# Patient Record
Sex: FEMALE | Race: WHITE | NOT HISPANIC OR LATINO | Employment: OTHER | URBAN - METROPOLITAN AREA
[De-identification: names, ages, dates, MRNs, and addresses within clinical notes are randomized per-mention and may not be internally consistent; named-entity substitution may affect disease eponyms.]

---

## 2017-01-09 ENCOUNTER — OFFICE VISIT (OUTPATIENT)
Dept: PODIATRY | Facility: CLINIC | Age: 37
End: 2017-01-09
Payer: MEDICARE

## 2017-01-09 VITALS
WEIGHT: 243.13 LBS | BODY MASS INDEX: 39.07 KG/M2 | DIASTOLIC BLOOD PRESSURE: 78 MMHG | HEIGHT: 66 IN | HEART RATE: 70 BPM | SYSTOLIC BLOOD PRESSURE: 119 MMHG

## 2017-01-09 DIAGNOSIS — B35.1 ONYCHOMYCOSIS DUE TO DERMATOPHYTE: ICD-10-CM

## 2017-01-09 DIAGNOSIS — L60.3 DYSTROPHIC NAIL: Primary | ICD-10-CM

## 2017-01-09 PROCEDURE — 99999 PR PBB SHADOW E&M-EST. PATIENT-LVL III: CPT | Mod: PBBFAC,,, | Performed by: PODIATRIST

## 2017-01-09 PROCEDURE — 99213 OFFICE O/P EST LOW 20 MIN: CPT | Mod: PBBFAC | Performed by: PODIATRIST

## 2017-01-09 PROCEDURE — 99212 OFFICE O/P EST SF 10 MIN: CPT | Mod: S$PBB,,, | Performed by: PODIATRIST

## 2017-01-09 NOTE — LETTER
January 13, 2017      Cece Winston MD  [address] [facility]  [address]  [facility] [address]           [facility]  [address]  [place] [address]  Phone: [phone]          Patient: Malorie Taylor   MR Number: [id number]   YOB: 1980   Date of Visit: 1/9/2017       Dear Dr. Cece Winston:    Thank you for referring Malorie Taylor to me for evaluation. Attached you will find relevant portions of my assessment and plan of care.    If you have questions, please do not hesitate to call me. I look forward to following Malorie Taylor along with you.    Sincerely,    Obey Brennan, ROSENDA    Enclosure  CC:  No Recipients    If you would like to receive this communication electronically, please contact [email or web address] or [phone] to request more information on [employer] Link access.    For providers and/or their staff who would like to refer a patient to [facility], please contact us through our one-stop-shop provider referral line, [facility], at [phone].    If you feel you have received this communication in error or would no longer like to receive these types of communications, please e-mail [email or web address]

## 2017-01-09 NOTE — PROGRESS NOTES
CC:    toenail fungus    HPI:   Malorie Taylor is a 36 y.o. female who presents to clinic with complaints of B/L great toe fungal toenails.  Cultures negative for fungus and Penlac has not been helping.   Patient states nails have been abnormal for a long time.  Pt. Has been applying OTC kerasal once a day since last visit 7/18/16. Has noticed improvement to nail appearance. Presents with family         Past Medical History   Diagnosis Date    Allergy     Seizures      epilepsy           Current Outpatient Prescriptions on File Prior to Visit   Medication Sig Dispense Refill    carbamazepine (TEGRETOL XR) 400 MG Tb12 Take 1 tablet (400 mg total) by mouth 2 (two) times daily. 1 Tablet Sustained Release 12 hr Oral Twice a day 180 tablet 1    ciclopirox (PENLAC) 8 % Soln Apply daily to affected nail. Must remove and restart weekly 1 Bottle 5    ferrous gluconate (FERGON) 325 MG Tab Take 1 tablet (325 mg total) by mouth daily with breakfast.  0    fish oil-omega-3 fatty acids 300-1,000 mg capsule Take 1 g by mouth once daily.        gabapentin (NEURONTIN) 600 MG tablet Take 2 tablets (1,200 mg total) by mouth 3 (three) times daily. 2 Tablet Oral Three times a day 540 tablet 1    multivitamin capsule Take 1 capsule by mouth once daily.      topiramate (TOPAMAX) 200 MG Tab Take 1 tablet (200 mg total) by mouth 2 (two) times daily. 180 tablet 1    vitamin D 1000 units Tab Take 185 mg by mouth once daily.       No current facility-administered medications on file prior to visit.           ALLG:  Review of patient's allergies indicates:   Allergen Reactions    Phenobarbital      Other reaction(s): Unknown             ROS:    General ROS: negative for - chills, fatigue or fever  Cardiovascular ROS: no chest pain or dyspnea on exertion  Musculoskeletal ROS: negative for - joint pain or joint stiffness   Skin: Negative for rash, Positive for nail or hair changes.  no itching.       EXAM:   Vitals:    01/09/17  "1429   BP: 119/78   Pulse: 70   Weight: 110.3 kg (243 lb 1.6 oz)   Height: 5' 6" (1.676 m)        General:  alert, no distress, cooperative    Vasc:  Pedal pulses present 2+  Neuro Motor:  Light touch and Sharp/dull sensation:  intact to light touch  Derm: yellow, crumbly, brittle and dystrophic nails   No presence of pigment involving the periungual skin.   Msk:  5/5 muscle strength.   Right foot: no gross deformity   Left foot: no gross deformity      ASSESSMENT / PLAN:   Patient education/counseling.      Dystrophic nail    Onychomycosis due to dermatophyte         Trim nails.  Keep well maintained at home.    Instructed patient to continue with application of OTC kerasal. Noted improvement in appearance to B/l hallux nails.     Discussed treatment options for fungus to nails including but not limited to topical kerasal, oral Lamisil, and anti fungal  creams. Patient elects to keep nails neatly trimmed and continue with OTC Kerasal       Blessing Aldridge DPM PGY-2      I have reviewed and concur with the resident's history, physical, assessment, and plan.  I have personally interviewed and examined the patient at bedside.  "

## 2017-04-10 DIAGNOSIS — G40.909 SEIZURE DISORDER: ICD-10-CM

## 2017-04-10 RX ORDER — TOPIRAMATE 200 MG/1
TABLET ORAL
Qty: 180 TABLET | Refills: 0 | Status: CANCELLED | OUTPATIENT
Start: 2017-04-10

## 2017-04-10 RX ORDER — GABAPENTIN 600 MG/1
TABLET ORAL
Qty: 540 TABLET | Refills: 0 | Status: CANCELLED | OUTPATIENT
Start: 2017-04-10

## 2017-04-11 ENCOUNTER — TELEPHONE (OUTPATIENT)
Dept: NEUROLOGY | Facility: CLINIC | Age: 37
End: 2017-04-11

## 2017-04-11 DIAGNOSIS — G40.909 SEIZURE DISORDER: ICD-10-CM

## 2017-04-11 RX ORDER — TOPIRAMATE 200 MG/1
TABLET ORAL
Qty: 180 TABLET | Refills: 0 | Status: CANCELLED | OUTPATIENT
Start: 2017-04-11

## 2017-04-11 RX ORDER — GABAPENTIN 600 MG/1
TABLET ORAL
Qty: 540 TABLET | Refills: 0 | Status: CANCELLED | OUTPATIENT
Start: 2017-04-11

## 2017-04-11 NOTE — TELEPHONE ENCOUNTER
Left message that patient may be given enough of her gabapentin and Topamax to tide her over until I see her tomorrow in clinic.

## 2017-04-12 ENCOUNTER — OFFICE VISIT (OUTPATIENT)
Dept: NEUROLOGY | Facility: CLINIC | Age: 37
End: 2017-04-12
Payer: MEDICARE

## 2017-04-12 VITALS — BODY MASS INDEX: 40 KG/M2 | WEIGHT: 248.88 LBS | HEIGHT: 66 IN

## 2017-04-12 DIAGNOSIS — F79 MENTAL RETARDATION: ICD-10-CM

## 2017-04-12 DIAGNOSIS — G40.909 SEIZURE DISORDER: Primary | ICD-10-CM

## 2017-04-12 PROCEDURE — 99213 OFFICE O/P EST LOW 20 MIN: CPT | Mod: PBBFAC

## 2017-04-12 PROCEDURE — 99999 PR PBB SHADOW E&M-EST. PATIENT-LVL III: CPT | Mod: PBBFAC,,,

## 2017-04-12 PROCEDURE — 99214 OFFICE O/P EST MOD 30 MIN: CPT | Mod: S$PBB,,,

## 2017-04-12 RX ORDER — CARBAMAZEPINE 400 MG/1
400 TABLET, EXTENDED RELEASE ORAL 2 TIMES DAILY
Qty: 180 TABLET | Refills: 1 | Status: SHIPPED | OUTPATIENT
Start: 2017-04-12 | End: 2017-10-10 | Stop reason: SDUPTHER

## 2017-04-12 RX ORDER — GABAPENTIN 600 MG/1
1200 TABLET ORAL 3 TIMES DAILY
Qty: 540 TABLET | Refills: 1 | Status: SHIPPED | OUTPATIENT
Start: 2017-04-12 | End: 2017-10-10 | Stop reason: SDUPTHER

## 2017-04-12 RX ORDER — TOPIRAMATE 200 MG/1
200 TABLET ORAL 2 TIMES DAILY
Qty: 180 TABLET | Refills: 1 | Status: SHIPPED | OUTPATIENT
Start: 2017-04-12 | End: 2017-10-10 | Stop reason: SDUPTHER

## 2017-04-12 NOTE — MR AVS SNAPSHOT
[facility]  [address]  [facility] [address]  Phone: [phone]  Fax: [phone]                  Malorie Mcmahon Brandon   2017 3:00 PM   Office Visit    Description:  Female : 1980   Provider:  Lam Gracia III, MD   Department:  [facility]           Reason for Visit     Follow-up           Diagnoses this Visit        Comments    Seizure disorder    -  Primary     Mental retardation                To Do List           Future Appointments        Provider Department Dept Phone    2017 3:00 PM Lam Gracia III, MD [facility] [phone]      Goals (5 Years of Data)     None      Follow-Up and Disposition     Return in about 6 months (around 10/12/2017).       These Medications        Disp Refills Start End    topiramate (TOPAMAX) 200 MG Tab 180 tablet 1 2017     Take 1 tablet (200 mg total) by mouth 2 (two) times daily. - Oral    Pharmacy: [facility] [address] Ph #: [phone]       Notes to Pharmacy: **Patient requests 90 days supply**    gabapentin (NEURONTIN) 600 MG tablet 540 tablet 1 2017     Take 2 tablets (1,200 mg total) by mouth 3 (three) times daily. 2 Tablet Oral Three times a day - Oral    Pharmacy: [facility] [address] Ph #: [phone]       carbamazepine (TEGRETOL XR) 400 MG Tb12 180 tablet 1 2017     Take 1 tablet (400 mg total) by mouth 2 (two) times daily. 1 Tablet Sustained Release 12 hr Oral Twice a day - Oral    Pharmacy: [facility] [address] Ph #: [phone]         [facility]     [facility] Nurse Care Line -  Assistance  Unless otherwise directed by your provider, please contact [facility], our nurse care line that is  "available for 24/7 assistance.     Registered nurses in the [facility] provide: appointment scheduling, clinical advisement, health education, and other advisory services.  Call: [phone] (toll free)               Medications           Message regarding Medications     Verify the changes and/or additions to your medication regime listed below are the same as discussed with your clinician today.  If any of these changes or additions are incorrect, please notify your healthcare provider.             Verify that the below list of medications is an accurate representation of the medications you are currently taking.  If none reported, the list may be blank. If incorrect, please contact your healthcare provider. Carry this list with you in case of emergency.           Current Medications     carbamazepine (TEGRETOL XR) 400 MG Tb12 Take 1 tablet (400 mg total) by mouth 2 (two) times daily. 1 Tablet Sustained Release 12 hr Oral Twice a day    ciclopirox (PENLAC) 8 % Soln Apply daily to affected nail. Must remove and restart weekly    ferrous gluconate (FERGON) 325 MG Tab Take 1 tablet (325 mg total) by mouth daily with breakfast.    fish oil-omega-3 fatty acids 300-1,000 mg capsule Take 1 g by mouth once daily.      gabapentin (NEURONTIN) 600 MG tablet Take 2 tablets (1,200 mg total) by mouth 3 (three) times daily. 2 Tablet Oral Three times a day    multivitamin capsule Take 1 capsule by mouth once daily.    topiramate (TOPAMAX) 200 MG Tab Take 1 tablet (200 mg total) by mouth 2 (two) times daily.    vitamin D 1000 units Tab Take 185 mg by mouth once daily.           Clinical Reference Information           Your Vitals Were     Height Weight BMI          5' 6" (1.676 m) 112.9 kg (248 lb 14.4 oz) 40.17 kg/m2        Allergies as of 4/12/2017     Phenobarbital      Immunizations Administered on Date of Encounter - 4/12/2017     None      [facility] Sign-Up     Activating your [facility] account is as easy as " 1-2-3!     1) Visit [email or web address], select Sign Up Now, enter this activation code and your date of birth, then select Next.  [email or web address]  Expires: 5/27/2017  1:41 PM      2) Create a username and password to use when you visit [facility] in the future and select a security question in case you lose your password and select Next.    3) Enter your e-mail address and click Sign Up!    Additional Information  If you have questions, please e-mail [email or web address] or call [phone] to talk to our [email or web address] staff. Remember, [facility] is NOT to be used for urgent needs. For medical emergencies, dial 911.         Language Assistance Services     ATTENTION: Language assistance services are available, free of charge. Please call [phone].      ATENCIÓN: Si habla español, tiene a willis disposición servicios gratuitos de asistencia lingüística. Llame al [phone].     CHÚ Ý: N?u b?n nói Ti?ng Vi?t, có các d?ch v? h? tr? ngôn ng? mi?n phí dành cho b?n. G?i s? [phone].         [facility] complies with applicable Federal civil rights laws and does not discriminate on the basis of race, color, national origin, age, disability, or sex.

## 2017-04-12 NOTE — PROGRESS NOTES
This office note has been dictated.  HISTORY OF PRESENT ILLNESS:  Malorie Taylor returns to Neurology Clinic for medical   management of her seizure disorder.  In spite of missing a dose of medication,   there have been no interval seizures.  She is accompanied to the office by her   father who helps provide some of the history.  She does not report any current   health issues.    NEUROLOGIC REVIEW OF SYSTEMS:  She has had allergic symptomatology and a   recent upper respiratory tract infection.  This was something that was passed   around the family, but has subsequently resolved.  She denies fever, headaches,   dizziness, tinnitus, vision or hearing problems, speech or swallowing   difficulty, chest pain, cough, shortness of breath, nausea, vomiting, diarrhea,   and incontinence.    MEDICATIONS:  I reviewed her list of medications and edited the electronic   record.    SOCIAL HISTORY:  She will be going out shopping on Friday for a new dress to   wear to her [employer] service.    PHYSICAL EXAMINATION:         GENERAL:  She is alert and attentive.  Her speech is appropriate and   adequately articulated.  VITAL SIGNS:  Reviewed and also included in this note.  She is neatly dressed   and in no acute distress.  NEUROLOGIC:  Cranial nerve examination reveals normal functioning of nerves   II-XII, which are serially examined.  The neck is supple, pulses equal and no   bruits are heard.  There is no focal motor weakness, tremor, ataxia or   dysmetria.  She has normal muscle tone and there are no gross sensory deficits.    The deep tendon reflexes are symmetrical, though depressed at the ankles.    I reviewed her clinic record including her recent laboratory the computer   console.  I discussed the situation with her and her father in detail.    IMPRESSION AND PLAN:  In conclusion, she continues to do well from the   standpoint of her seizure disorder.  Her medication will be continued.  I am   prescribing Topamax 200 mg twice  a day, carbamazepine 400 mg twice a day,   gabapentin 1200 mg three times a day.  She will need to follow up with her   primary care doctor, Dr. Sapp for her anemia.  Her next appointment with me   will be in six months.  She may call if there is a question in the interim.      FSO/HN  dd: 04/12/2017 14:25:34 (CDT)  td: 04/13/2017 08:19:57 (CDT)  Doc ID   #[id number]  Job ID #[id number]    CC:

## 2017-04-12 NOTE — LETTER
April 12, 2017      Cece Winston MD  [address] [facility]  [address]  [facility] [address]           [facility]  [address]  [place] [address]  Phone: [phone]  Fax: [phone]          Patient: Malorie Taylor   MR Number: [id number]   YOB: 1980   Date of Visit: 4/12/2017       Dear Dr. Cece Winston:    Thank you for referring Malorie Taylor to me for evaluation. Attached you will find relevant portions of my assessment and plan of care.    If you have questions, please do not hesitate to call me. I look forward to following Malorie Taylor along with you.    Sincerely,    Lam Gracia III, MD    Enclosure  CC:  No Recipients    If you would like to receive this communication electronically, please contact [email or web address] or [phone] to request more information on [employer] Link access.    For providers and/or their staff who would like to refer a patient to [facility], please contact us through our one-stop-shop provider referral line, [facility], at [phone].    If you feel you have received this communication in error or would no longer like to receive these types of communications, please e-mail [email or web address]

## 2017-05-09 DIAGNOSIS — G40.909 SEIZURE DISORDER: ICD-10-CM

## 2017-05-10 RX ORDER — CARBAMAZEPINE 400 MG/1
TABLET, EXTENDED RELEASE ORAL
Qty: 180 TABLET | Refills: 0 | OUTPATIENT
Start: 2017-05-10

## 2017-10-10 ENCOUNTER — OFFICE VISIT (OUTPATIENT)
Dept: NEUROLOGY | Facility: CLINIC | Age: 37
End: 2017-10-10
Payer: MEDICARE

## 2017-10-10 VITALS
SYSTOLIC BLOOD PRESSURE: 123 MMHG | HEIGHT: 66 IN | DIASTOLIC BLOOD PRESSURE: 77 MMHG | HEART RATE: 75 BPM | BODY MASS INDEX: 41.38 KG/M2 | WEIGHT: 257.5 LBS

## 2017-10-10 DIAGNOSIS — G40.909 SEIZURE DISORDER: ICD-10-CM

## 2017-10-10 PROCEDURE — 99999 PR PBB SHADOW E&M-EST. PATIENT-LVL III: CPT | Mod: PBBFAC,,, | Performed by: PSYCHIATRY & NEUROLOGY

## 2017-10-10 PROCEDURE — 99213 OFFICE O/P EST LOW 20 MIN: CPT | Mod: PBBFAC | Performed by: PSYCHIATRY & NEUROLOGY

## 2017-10-10 PROCEDURE — 99215 OFFICE O/P EST HI 40 MIN: CPT | Mod: S$PBB,,, | Performed by: PSYCHIATRY & NEUROLOGY

## 2017-10-10 RX ORDER — TOPIRAMATE 200 MG/1
200 TABLET ORAL 2 TIMES DAILY
Qty: 180 TABLET | Refills: 4 | Status: SHIPPED | OUTPATIENT
Start: 2017-10-10 | End: 2018-11-01 | Stop reason: SDUPTHER

## 2017-10-10 RX ORDER — CARBAMAZEPINE 400 MG/1
400 TABLET, EXTENDED RELEASE ORAL 2 TIMES DAILY
Qty: 180 TABLET | Refills: 4 | Status: SHIPPED | OUTPATIENT
Start: 2017-10-10 | End: 2018-11-01 | Stop reason: SDUPTHER

## 2017-10-10 RX ORDER — GABAPENTIN 600 MG/1
1200 TABLET ORAL 3 TIMES DAILY
Qty: 540 TABLET | Refills: 4 | Status: SHIPPED | OUTPATIENT
Start: 2017-10-10 | End: 2018-11-01 | Stop reason: SDUPTHER

## 2017-10-10 NOTE — PROGRESS NOTES
[facility]:   FOLLOW UP VISIT    Name: Malorie Taylor  MRN:[id number]   CSN: [id number]  Date of service: 10/10/2017    Last clinic visit: 4/12/17 ()    Age:37 y.o.   Gender:female     The patient is here today with her father  History obtained from the patient, her father and prior notes    CHIEF COMPLAINT:   - follow-up of seizures    INTERVAL HISTORY (Since last visit):    This is a 37 y.o.  right handed female who presents for follow-up of seizures    Controlled on her current AED regimen with no seizures > 12 years  - per father addition of Topiramate controlled the seizures    Brief seizure history:  Parents noted 1st seizure at age 7 years - but noted by 3 siblings (seperated by ~ 1 year each; 2 older and 1 younger) from earlier on (but unclear re exact onset)   Seizure types:  1) Blank stare: eyes open, lasting few seconds and unresponsive  2) Confusional episodes: more common  - GTC seizure occurred only when attempted to take off/taper AEDs   No hx of SE or admission into hospital for seizures.    SEIZURE HISTORY: Clinic note, 4/12/2017 ():  Malorie Taylor returns to Neurology Clinic for medical management of her seizure disorder.    In spite of missing a dose of medication, there have been no interval seizures.  She is accompanied to the office by her father who helps provide some of the history.  She does not report any current health issues.  IMPRESSION AND PLAN:  In conclusion, she continues to do well from the standpoint of her seizure disorder.  Her medication will be continued.    I am prescribing Topamax 200 mg twice a day, carbamazepine 400 mg twice a day, gabapentin 1200 mg three times a day.    She will need to follow up with her primary care doctor, Dr. Sapp for her anemia.    Her next appointment with me will be in six months.  She may call if there is a question in the interim    RELEVANT LABS AND TESTS SINCE LAST VISIT:   Additional tests:  1)CT Scan: normal, per father    2) EEG\Video Monitoring:no   3) PET Scan: no  4) Neuropsychological evaluation: no  5) DEXA Scan: no  6) Others: no    RISK FACTORS FOR SEIZURES:    1. Head Trauma:  No    2. CNS Infections:  No  3. CNS Tumors: No     4. CNS Vascular Disease: No     5. Febrile Seizures: No    6. Developmental Delay: Yes - required Sp Ed       7. Family History of Seizures: Yes : paternal GM - one of a twin, who had seizures   8. Birth history: FT CS (prior CS)    Pregnancy/Labor/Delivery: n/a    CURRENT MEDICATIONS:   Current Outpatient Prescriptions   Medication Sig Dispense Refill    carbamazepine (TEGRETOL XR) 400 MG Tb12 Take 1 tablet (400 mg total) by mouth 2 (two) times daily. 1 Tablet Sustained Release 12 hr Oral Twice a day 180 tablet 1    ciclopirox (PENLAC) 8 % Soln Apply daily to affected nail. Must remove and restart weekly 1 Bottle 5    ferrous gluconate (FERGON) 325 MG Tab Take 1 tablet (325 mg total) by mouth daily with breakfast.  0    fish oil-omega-3 fatty acids 300-1,000 mg capsule Take 1 g by mouth once daily.        gabapentin (NEURONTIN) 600 MG tablet Take 2 tablets (1,200 mg total) by mouth 3 (three) times daily. 2 Tablet Oral Three times a day 540 tablet 1    multivitamin capsule Take 1 capsule by mouth once daily.      topiramate (TOPAMAX) 200 MG Tab Take 1 tablet (200 mg total) by mouth 2 (two) times daily. 180 tablet 1    vitamin D 1000 units Tab Take 185 mg by mouth once daily.       No current facility-administered medications for this visit.       Not on any contraception    CURRENT ANTI EPILEPTIC MEDICATIONS:   1) Topiramate 200 mg bid  2) Carbamazepine 400 mg bid   3) Gabapentin 1200 mg tid    VAGAL NERVE STIMULATOR: n/a    PRIOR ANTICONVULSANT HISTORY: tried several prior - not sure which ones; father remembers PB (had an allergic reaction)      PAST MEDICAL HISTORY:   Active Ambulatory Problems     Diagnosis Date Noted    Seizure disorder 08/10/2012    Mental retardation 08/26/2013     Kyphosis 08/26/2013    Obesity 10/31/2015     Resolved Ambulatory Problems     Diagnosis Date Noted    No Resolved Ambulatory Problems     Past Medical History:   Diagnosis Date    Allergy     Seizures         PAST PSYCHIATRIC HISTORY:  Mood swings during menstrual periods    PAST SURGICAL HISTORY including Epilepsy surgery: No past surgical history on file.     FAMILY HISTORY:   Family History   Problem Relation Age of Onset    Acne Neg Hx     Eczema Neg Hx     Lupus Neg Hx     Psoriasis Neg Hx     Melanoma Neg Hx          SOCIAL HISTORY:   Social History     Social History    Marital status: Single     Spouse name: N/A    Number of children: N/A    Years of education: N/A     Occupational History    Housekeeping      Social History Main Topics    Smoking status: Former Smoker    Smokeless tobacco: Never Used    Alcohol use No    Drug use: Unknown    Sexual activity: Not on file     Other Topics Concern    Not on file     Social History Narrative    No narrative on file      a) Marital status: Single                                                    b) Living situation: patient lives with parents  c) Employed/Unemployed/Other: Employed part time - works ~ 2 hours per day (housekeeping) at the [employer]    DRIVING HISTORY:  Currently driving: No      LEVEL OF EDUCATION: Sp Ed    SUBSTANCE USE: no hx of tobacco, etoh or other substance use  ALLERGIES: Phenobarbital     REVIEW OF SYSTEMS:  Review of Systems     GENERAL EXAMINATION:  There were no vitals taken for this visit.     GENERAL EXAMINATION:  General Appearance:    This is an average built female who appears well.  HEENT: There are mild dysmorphic features  Skin: There are no obvious stigmata for neurocutaneous disorders.  Neck: supple   Cardiovascular: regular rate and rhythm  Lungs: clear  Abdomen: deferred  The spine is non-tender.  Kyphosis:  NoScoliosis: No   Extremities: Warm and well perfused    NEUROLOGICAL  EXAMINATION:  Mental status: Alert, appropriate and pleasant   Speech: normal  Dysarthria: No   Eyes: PERRL; EOM intact; No nystagmus.The visual pursuits  were smooth with normal saccadic eye movements.   Fundoscopic Exam: not tested  No facial asymmetry. Intact facial sensation bilaterally.  Hearing was intact bilaterally to finger rub  Tongue and palate was in the midline  Intact SCM and trapezii bilaterally     Motor examination:  Normal bulk and tone bilaterally. Power: no pronater drift; 5/5 bilaterally symmetric UE/LE  Abnormal movements: none  Deep tendon reflexes: 2+ bilaterally symmetric UE/LE with flexor plantars  Dysmetria: No     Sensory examination:   Not assessed at this time    Gait:  Normal gait and station    IMPRESSION:  The patient's history is consistent with   Seizure disorder  38yo F with hx of MR and seizures since age ~7years  - controlled on current AED regimen with no seizures > 12 years    Plan:  - continue same regimen:  1) Topiramate 200 mg bid  2) Carbamazepine 400 mg bid   3) Gabapentin 1200 mg tid  - check AED levels: pt will have PCP visit in 1 week - will get it done and send us the results    Return in 1 year or earlier prn    Plan of care was discussed with patient and her father.    The patient was asked to call me/the clinic 1 week after the test(s) are done to obtain results.  More than 50% of the time with the patient (as well as family/caregiver(s) was spent on face-to-face counseling about:  1. Diagnosis, plans, prognosis, medications and possible side-effects, risks and benefits of treatment, other alternatives to AEDs.  2. Risks related to continued seizures, status epilepticus, SUDEP, driving restrictions and seizure precautions ( no baths but showers are ok, no swimming unsupervised, no use of heavy machinery, no use of sharp moving objects, avoid heights).   3. Issues related to pregnancy, OCP and breast feeding as it relates to epilepsy (in female patients).  4. The  potential of teratogenicity (for female patients) and suicidal risks of anti-epileptic medications.  5.Avoid any activity that compromise patient safety related to seizures.    Questions and concerns raised by the patient and family/care-giver(s) were addressed and they indicated understanding of everything discussed and agreed to plans as above.    Return in 1 year or earlier prn    Karly Shankar MD, ESTEFANY(), FACNS.  Neurology-Epilepsy.

## 2017-10-26 ENCOUNTER — TELEPHONE (OUTPATIENT)
Dept: FAMILY MEDICINE | Facility: CLINIC | Age: 37
End: 2017-10-26

## 2017-10-26 DIAGNOSIS — Z00.00 ROUTINE GENERAL MEDICAL EXAMINATION AT A HEALTH CARE FACILITY: Primary | ICD-10-CM

## 2017-10-26 DIAGNOSIS — G40.909 SEIZURE DISORDER: ICD-10-CM

## 2017-10-26 DIAGNOSIS — R53.83 FATIGUE, UNSPECIFIED TYPE: ICD-10-CM

## 2017-10-26 DIAGNOSIS — E66.9 OBESITY, UNSPECIFIED CLASSIFICATION, UNSPECIFIED OBESITY TYPE, UNSPECIFIED WHETHER SERIOUS COMORBIDITY PRESENT: ICD-10-CM

## 2017-10-26 NOTE — TELEPHONE ENCOUNTER
----- Message from Jennifer Gregory sent at 10/26/2017 11:08 AM CDT -----  Contact: mother  Doctor appointment and lab have been scheduled.  Please link lab orders to the lab appointment.  Date of doctor appointment:  1/24  Physical or EP:  epp  Date of lab appointment:  1/19  Comments:

## 2017-10-26 NOTE — TELEPHONE ENCOUNTER
Per patient's insurance they will not cover a routine TSH and lipid panel patient has medicare do you have a DX's that can cover the non covered labs

## 2018-01-19 ENCOUNTER — LAB VISIT (OUTPATIENT)
Dept: LAB | Facility: HOSPITAL | Age: 38
End: 2018-01-19
Attending: FAMILY MEDICINE
Payer: MEDICARE

## 2018-01-19 DIAGNOSIS — R53.83 FATIGUE, UNSPECIFIED TYPE: ICD-10-CM

## 2018-01-19 DIAGNOSIS — G40.909 SEIZURE DISORDER: ICD-10-CM

## 2018-01-19 DIAGNOSIS — E66.9 OBESITY, UNSPECIFIED CLASSIFICATION, UNSPECIFIED OBESITY TYPE, UNSPECIFIED WHETHER SERIOUS COMORBIDITY PRESENT: ICD-10-CM

## 2018-01-19 DIAGNOSIS — Z00.00 ROUTINE GENERAL MEDICAL EXAMINATION AT A HEALTH CARE FACILITY: ICD-10-CM

## 2018-01-19 LAB
ALBUMIN SERPL BCP-MCNC: 3.5 G/DL
ALP SERPL-CCNC: 108 U/L
ALT SERPL W/O P-5'-P-CCNC: 19 U/L
ANION GAP SERPL CALC-SCNC: 7 MMOL/L
AST SERPL-CCNC: 20 U/L
BASOPHILS # BLD AUTO: 0.01 K/UL
BASOPHILS NFR BLD: 0.2 %
BILIRUB SERPL-MCNC: 0.3 MG/DL
BUN SERPL-MCNC: 13 MG/DL
CALCIUM SERPL-MCNC: 9.3 MG/DL
CHLORIDE SERPL-SCNC: 106 MMOL/L
CHOLEST SERPL-MCNC: 237 MG/DL
CHOLEST/HDLC SERPL: 4 {RATIO}
CO2 SERPL-SCNC: 23 MMOL/L
CREAT SERPL-MCNC: 0.8 MG/DL
DIFFERENTIAL METHOD: ABNORMAL
EOSINOPHIL # BLD AUTO: 0.1 K/UL
EOSINOPHIL NFR BLD: 1.7 %
ERYTHROCYTE [DISTWIDTH] IN BLOOD BY AUTOMATED COUNT: 12.1 %
EST. GFR  (AFRICAN AMERICAN): >60 ML/MIN/1.73 M^2
EST. GFR  (NON AFRICAN AMERICAN): >60 ML/MIN/1.73 M^2
GLUCOSE SERPL-MCNC: 91 MG/DL
HCT VFR BLD AUTO: 36 %
HDLC SERPL-MCNC: 60 MG/DL
HDLC SERPL: 25.3 %
HGB BLD-MCNC: 12 G/DL
IMM GRANULOCYTES # BLD AUTO: 0.02 K/UL
IMM GRANULOCYTES NFR BLD AUTO: 0.3 %
LDLC SERPL CALC-MCNC: 160.2 MG/DL
LYMPHOCYTES # BLD AUTO: 2.2 K/UL
LYMPHOCYTES NFR BLD: 33.1 %
MCH RBC QN AUTO: 34 PG
MCHC RBC AUTO-ENTMCNC: 33.3 G/DL
MCV RBC AUTO: 102 FL
MONOCYTES # BLD AUTO: 0.4 K/UL
MONOCYTES NFR BLD: 6.7 %
NEUTROPHILS # BLD AUTO: 3.8 K/UL
NEUTROPHILS NFR BLD: 58 %
NONHDLC SERPL-MCNC: 177 MG/DL
NRBC BLD-RTO: 0 /100 WBC
PLATELET # BLD AUTO: 258 K/UL
PMV BLD AUTO: 9.2 FL
POTASSIUM SERPL-SCNC: 3.9 MMOL/L
PROT SERPL-MCNC: 7.7 G/DL
RBC # BLD AUTO: 3.53 M/UL
SODIUM SERPL-SCNC: 136 MMOL/L
TRIGL SERPL-MCNC: 84 MG/DL
TSH SERPL DL<=0.005 MIU/L-ACNC: 3.09 UIU/ML
WBC # BLD AUTO: 6.53 K/UL

## 2018-01-19 PROCEDURE — 84443 ASSAY THYROID STIM HORMONE: CPT

## 2018-01-19 PROCEDURE — 80053 COMPREHEN METABOLIC PANEL: CPT

## 2018-01-19 PROCEDURE — 85025 COMPLETE CBC W/AUTO DIFF WBC: CPT

## 2018-01-19 PROCEDURE — 36415 COLL VENOUS BLD VENIPUNCTURE: CPT | Mod: PO

## 2018-01-19 PROCEDURE — 80061 LIPID PANEL: CPT

## 2018-01-24 ENCOUNTER — OFFICE VISIT (OUTPATIENT)
Dept: FAMILY MEDICINE | Facility: CLINIC | Age: 38
End: 2018-01-24
Payer: MEDICARE

## 2018-01-24 VITALS
WEIGHT: 257.69 LBS | BODY MASS INDEX: 41.42 KG/M2 | TEMPERATURE: 98 F | HEART RATE: 86 BPM | HEIGHT: 66 IN | DIASTOLIC BLOOD PRESSURE: 79 MMHG | SYSTOLIC BLOOD PRESSURE: 119 MMHG

## 2018-01-24 DIAGNOSIS — Z12.4 ROUTINE PAPANICOLAOU SMEAR: Primary | ICD-10-CM

## 2018-01-24 DIAGNOSIS — G40.909 SEIZURE DISORDER: ICD-10-CM

## 2018-01-24 PROCEDURE — 99214 OFFICE O/P EST MOD 30 MIN: CPT | Mod: S$PBB,25,, | Performed by: FAMILY MEDICINE

## 2018-01-24 PROCEDURE — 99999 PR PBB SHADOW E&M-EST. PATIENT-LVL IV: CPT | Mod: PBBFAC,,, | Performed by: FAMILY MEDICINE

## 2018-01-24 PROCEDURE — 99214 OFFICE O/P EST MOD 30 MIN: CPT | Mod: PBBFAC,PO | Performed by: FAMILY MEDICINE

## 2018-01-24 PROCEDURE — 88175 CYTOPATH C/V AUTO FLUID REDO: CPT

## 2018-01-24 PROCEDURE — G0101 CA SCREEN;PELVIC/BREAST EXAM: HCPCS | Mod: S$PBB,,, | Performed by: FAMILY MEDICINE

## 2018-01-24 PROCEDURE — G0101 CA SCREEN;PELVIC/BREAST EXAM: HCPCS | Mod: PBBFAC,PO | Performed by: FAMILY MEDICINE

## 2018-01-24 NOTE — PATIENT INSTRUCTIONS
Low-Fat Cooking Tips  To eat less fat, you may need to learn some new ways to cook. But that doesn't mean you have to eat bland, boring food. And it doesn't mean cooking needs to take any more time. Here are some tips for cooking and seasoning foods with less fat.     Broil fish instead of frying it. And sprinkle on herbs to add flavor.    Try New Cooking Methods  · Broil, roast, bake, steam, or microwave fish, chicken, turkey, and meat.  · Remove skin from chicken and turkey and trim extra fat from meat before cooking.  · Sprinkle herbs on meat, chicken, and fish, and in soups.  · Cook in broth instead of fat.  · Use nonstick cooking sprays or nonstick pans.  · Steam or microwave vegetables without adding fat. Serve with herbs, lemon juice, vinegar, or fat-free butter-flavored powder.  · To flavor beans and rice, add chopped onions, garlic, and peppers.  · Chill soups and stews. Before reheating and serving, skim off the fat.  · When you add fat, use canola or olive oil instead of butter or lard.  Lighten Up Your Recipes  · In soups and sauces: Replace whole milk or cream with low-fat milk, evaporated fat-free milk, or nonfat dry milk.  · In puddings and other desserts: Replace whole milk or cream with low-fat milk or fat-free condensed milk.  · To make dips and toppings: Use low-fat or nonfat cottage cheese or sour cream.  · To make salad dressings: Use nonfat yogurt or low-fat buttermilk.  · In place of 1 whole egg in recipes: Use 2 egg whites or 1/4 cup egg substitute.  · In place of regular cheese: Use fat-free or reduced-fat cheese.  Date Last Reviewed: 5/11/2015  © 6129-2623 [employer]. [address] [place] [address], [place] [address]. All rights reserved. This information is not intended as a substitute for professional medical care. Always follow your healthcare professional's instructions.        Low-Cholesterol Diet  Your body needs cholesterol to build new cells and create certain hormones.  There are 2 kinds of cholesterol in your blood:     · HDL (good) cholesterol. This prevents fat deposits (plaque) from building up in your arteries. In this way it protects against heart disease and stroke.  · LDL (bad) cholesterol. This stays in your body and sticks to artery walls. Over time it may block blood flow to the heart and brain. This can cause a heart attack or stroke.  The cholesterol in your blood comes from 2 sources: cholesterol in food that you eat and cholesterol that your liver makes. You should limit the amount of cholesterol in your diet. But the cholesterol that your body makes has the greatest disease risk. And your body makes more cholesterol when your diet is high in bad fats (saturated and trans fats). There are 2 kinds of fats you can eat:  · Good fats, or unsaturated fats (mono-unsaturated and poly-unsaturated). They raise the level of good cholesterol and lower the level of bad cholesterol. Good fats are found in vegetable oils such as olive, sunflower, corn, and soybean oils, and in nuts and seeds.  · Bad fats, or saturated fats (including foods high in cholesterol) and trans fats. These raise your risk of disease. They lower the good cholesterol and raise the level of bad cholesterol. Bad fats are found in animal products, including meat, whole-milk dairy products, and butter. Some plants are also high in bad fats (coconut and palm plants). Trans fats are found in hard (stick) margarines. They are also in many fast foods and commercially baked goods. Soft margarine sold in tubs has fewer trans fats and is safer to use.  High blood cholesterol is usually due to a diet high in saturated fat, along with not being physically active. In some cases, genetics plays a role in causing high cholesterol. The tips below will help you create healthy eating habits that will help lower your blood cholesterol level.  Create a diet high in good fats, low in bad fats (and low in cholesterol)  The  following steps will help you create a diet high in good fats and low in bad fats:  · Talk with your doctor before starting a low cholesterol diet or weight loss program.  · Learn to read nutrition labels and select appropriate portion sizes.  · When cooking, use plant-based unsaturated vegetable oils (sunflower, corn, soybean, canola, peanut, and olive oils).  · Avoid saturated fats found in animal products such as meat, dairy (whole-milk, cheese and ice cream), poultry skin, and egg yolks. Plants high in saturated oils include coconut oil, palm oil, and palm kernel oil.  · If you eat meat, choose smaller portions and lean cuts, such as round, denver, sirloin, or loin. Eat more meatless meals.  · Replace meat with fish at least 2 times a week. Fish is an important source of the unsaturated fat called omega-3 fatty acids. This fat has potential to lower the risk of heart disease.  · Replace whole-milk dairy products with low-fat or nonfat products. Try soy products. Soy helps to reduce total cholesterol.  · Supplement your diet with protective fibers. Eat nuts, seeds, and whole grains rather than white rice and bread. These foods lower both cholesterol and triglyceride levels. (Triglycerides are another fat found in the blood.) Walnuts are one of the best sources of omega-3 fatty acids.  · Eat plenty of fresh fruits and vegetables daily.  · Avoid fast foods and commercial baked goods. Assume they contain saturated fat unless labeled otherwise.  Date Last Reviewed: 8/1/2016  © 0156-3909 [employer]. [address] [place] [address], [place] [address]. All rights reserved. This information is not intended as a substitute for professional medical care. Always follow your healthcare professional's instructions.        Low-Fat Diet    A low-fat diet will help you lose weight. It also can lower cholesterol and prevent symptoms of gallbladder disease. The average American diet contains up to 50% fat. This means that 50%  of all calories come from fat (about 80 grams to 100 grams of fat per day). Choosing normal portions of foods from the list below can help lower your fat intake. Experts recommend that only 20% to 35% of your daily calories come from fat. The remaining 65% to 80% of calories will come from protein and carbohydrates. This is much healthier for you.  Breads  Ok: Whole-wheat or rye bread, braeden or soda crackers, jess toast, plain rolls, bagels, English muffins  Avoid: Rolls and breads containing whole milk or egg; waffles, pancakes, biscuits, corn bread; cheese crackers, other flavored crackers, pastries, doughnuts  Cereals  Ok: Oatmeal, whole-wheat, bran, multigrain, rice  Avoid: Granola or other cereals that have oil, coconut, or more than 2 grams of fat per serving  Cheese and eggs  Ok: Cheeses labeled low-fat; 3 whole eggs per week; egg whites and egg substitutes as desired  Avoid: All other cheeses  Desserts  Ok: Gelatin, slushy, duy food cake, meringues, nonfat yogurt, and puddings or sherbet made with nonfat milk  Avoid: Any other store-bought desserts, or desserts that have fat, whole milk, cream, chocolate, and coconut  Drinks  Ok: Nonfat milk, coffee, tea, fizzy (carbonated) drinks  Avoid: Whole and reduced-fat milk, evaporated and condensed milk, hot chocolate mixes, milk shakes, malts, eggnog  Fats  Ok: You may have up to 3 teaspoons of fat daily. This can be butter, margarine, mayonnaise, or healthy oils (canola or olive)  Avoid: Cream, nondairy creams, cream cheese, gravies, and cream sauces  Fruits  Ok: All fruits made without fat  Avoid: Coconut, olives  Meats, poultry, fish  Ok: Limit meat to 6 ounces daily (broiled, roasted, baked, grilled, or boiled). Buy lean cuts, and trim off the fat. Try beef, fish, lamb, pork, and canned fish packed in water; also chicken and turkey with the skin removed.  Avoid: Fried meats, fish, or poultry; fried eggs, and fish canned in oils; fatty meats such as silva,  sausage, corned beef, hot dogs, and lunch meats; meats with gravies and sauces  Potatoes, beans, pasta  Ok: Dried beans, split peas, lentils, potatoes, rice, pasta made without added fat  Avoid: French fries, potato chips, potatoes prepared with butter, refried beans  Soups  Ok: Clear broth soups without fat and with allowed vegetables  Avoid: Cream-based soups  Vegetables  Ok: Fresh, frozen, canned or dried vegetables, all made without added fat  Avoid: Fried vegetables and those prepared with butter, cream, sauces  Miscellaneous  Ok: Salt, sugar, jelly, hard candy, marshmallows, honey, syrup, spices and herbs, mustard, ketchup, lemon, and vinegar. Try to limit sweets and added sugars.  Avoid: Chocolate, nuts, coconut, and cream candies; sunflower, sesame, and other seeds; fried foods; cream sauces and gravies; pizza  Date Last Reviewed: 8/1/2016  © 8020-3602 [employer]. [address] [place] [address]. All rights reserved. This information is not intended as a substitute for professional medical care. Always follow your healthcare professional's instructions.

## 2018-01-25 NOTE — PROGRESS NOTES
Malorie Taylor is a 37 y.o. female  Source of history: Patient  Routine gyn and breast exam, f/u seizure disorder, hyperlipidemia , high risk meds.  Past Medical History:   Diagnosis Date    Allergy     Seizures     epilepsy     Patient  reports that she has never smoked. She has never used smokeless tobacco. She reports that she does not drink alcohol.  Family History   Problem Relation Age of Onset    Acne Neg Hx     Eczema Neg Hx     Lupus Neg Hx     Psoriasis Neg Hx     Melanoma Neg Hx      ROS:  GENERAL: No fever, chills, fatigability or weight loss.  SKIN: No rashes, itching or changes in color or texture of skin.  HEAD: No headaches or recent head trauma.  EYES: Visual acuity fine. No photophobia, ocular pain or diplopia.  EARS: Denies ear pain, discharge or vertigo.  NOSE: No loss of smell, no epistaxis or postnasal drip.  MOUTH & THROAT: No hoarseness or change in voice. No excessive gum bleeding.  NODES: Denies swollen glands.  CHEST: Denies VILLELA, cyanosis, wheezing, cough and sputum production.  CARDIOVASCULAR: Denies chest pain, PND, orthopnea or reduced exercise tolerance.  ABDOMEN: Appetite fine. No weight loss. Denies diarrhea, abdominal pain, hematemesis or blood in stool.  URINARY: No flank pain, dysuria or hematuria.  PERIPHERAL VASCULAR: No claudication or cyanosis.  MUSCULOSKELETAL: No joint stiffness or swelling. Denies back pain.  NEUROLOGIC: No history of seizures, paralysis, alteration of gait or coordination.    OBJECTIVE:  APPEARANCE:overweight mildly distressed  Vitals:    01/24/18 1245   BP: 119/79   Pulse: 86   Temp: 98.3 °F (36.8 °C)     SKIN: Normal skin turgor, no lesions.  HEENT: Both external auditory canals clear. Both tympanic membranes intact. PERRL. EOMI. Disk margins sharp. No tonsillar enlargement. No pharyngeal erythema or exudate. No stridor.  NECK: No bruits. No cervical spine tenderness. No cervical lymphadenopathy. No thyromegaly.  NODES: No cervical, axillary  or inguinal lymph node enlargement.  CHEST: Breath sounds clear bilaterally. Lungs clear to auscultation & percussion. Good air movement. No rales. No retractions. No rhonchi. No stridor. No wheezes.  CARDIOVASCULAR: Normal S1, S2. No murmurs. No edema.  BREASTS: no masses palpated in either breast or axillary area, symmetry noted.  ABDOMEN: Bowel sounds normal. No palpable aortic enlargement. No CVA tenderness. No pulsatile mass. No rebound tenderness.  PELVIC: speculum exam reveals a normal appearing cervix ,pap was performed and pending.  Bimanual exam difficult due to pt anxiety, grossly normal exam.  GENITALIA: normal external exam  PERIPHERAL VASCULAR: Femoral pulses present and symmetrical. No edema.  MUSCULOSKELETAL: Degenerative changes of both ankles, foot, knee, wrist and hand.  BACK: No CVA tenderness. There is no spasm, tenderness or radiculopathy noted with palpation and there is full range of motion.   NEUROLOGIC:   Cranial Nerves: II-XII grossly intact.  Motor: 5/5 strength major flexors/extensors. No tremor.  DTR's: Knees, Ankles 2+ and equal bilaterally; downgoing toes.  Sensory: Intact to light touch distally.  Gait & Posture: Normal gait and fine motion. No cerebellar signs.  MENTAL STATUS: Alert. Oriented x 3. Language skills normal. Memory intact. No suicidal ideation. Normal affect. Well kept appearance.    ASSESSMENT/PLAN:   SEizure Disorer   topiramate (TOPAMAX) 200 MG Tab 200 mg, 2 times daily        gabapentin (NEURONTIN) 600 MG tablet 1,200 mg, 3 times daily        carbamazepine (TEGRETOL XR) 400 MG Tb12 400 mg, 2 times daily       Routine PAp smear  Results pending    Hyperlipidemia  Low fat diet  Repeat lipid profile in 6 months

## 2018-01-25 NOTE — PROGRESS NOTES
Patient, Malorie Taylor (MRN #[id number]), presented with a recorded BMI of 42.23 kg/m^2 consistent with the definition of morbid obesity (ICD-10 E66.01). The patient's morbid obesity was monitored, evaluated, addressed and/or treated. This addendum to the medical record is made on 01/25/2018.

## 2018-09-13 ENCOUNTER — PES CALL (OUTPATIENT)
Dept: ADMINISTRATIVE | Facility: CLINIC | Age: 38
End: 2018-09-13

## 2018-10-08 ENCOUNTER — TELEPHONE (OUTPATIENT)
Dept: FAMILY MEDICINE | Facility: CLINIC | Age: 38
End: 2018-10-08

## 2018-10-08 DIAGNOSIS — E66.9 OBESITY, UNSPECIFIED CLASSIFICATION, UNSPECIFIED OBESITY TYPE, UNSPECIFIED WHETHER SERIOUS COMORBIDITY PRESENT: ICD-10-CM

## 2018-10-08 DIAGNOSIS — G40.909 SEIZURE DISORDER: ICD-10-CM

## 2018-10-08 DIAGNOSIS — R53.83 FATIGUE, UNSPECIFIED TYPE: ICD-10-CM

## 2018-10-08 DIAGNOSIS — Z00.00 ROUTINE GENERAL MEDICAL EXAMINATION AT A HEALTH CARE FACILITY: Primary | ICD-10-CM

## 2018-10-08 NOTE — TELEPHONE ENCOUNTER
----- Message from Jonelle Eisenberg sent at 10/8/2018  3:01 PM CDT -----  Doctor appointment and lab have been scheduled.  Please link lab orders to the lab appointment.  Date of doctor appointment:  02/08/2019  Physical or EP:  epp  Date of lab appointment:  02/04/2019  Comments:

## 2018-10-15 ENCOUNTER — TELEPHONE (OUTPATIENT)
Dept: NEUROLOGY | Facility: CLINIC | Age: 38
End: 2018-10-15

## 2018-10-15 NOTE — TELEPHONE ENCOUNTER
Spoke to father and explained to him that Patient needs to be seen for further refills.Medications filled with one additional refill.He has agreed to bring Patient on 11/1 at 2 PM.Letter to be sent.

## 2018-10-25 ENCOUNTER — OFFICE VISIT (OUTPATIENT)
Dept: INTERNAL MEDICINE | Facility: CLINIC | Age: 38
End: 2018-10-25
Payer: MEDICARE

## 2018-10-25 VITALS
HEIGHT: 65 IN | OXYGEN SATURATION: 99 % | TEMPERATURE: 98 F | HEART RATE: 66 BPM | DIASTOLIC BLOOD PRESSURE: 68 MMHG | WEIGHT: 240.06 LBS | SYSTOLIC BLOOD PRESSURE: 113 MMHG | BODY MASS INDEX: 40 KG/M2

## 2018-10-25 DIAGNOSIS — Z00.00 ENCOUNTER FOR PREVENTIVE HEALTH EXAMINATION: Primary | ICD-10-CM

## 2018-10-25 DIAGNOSIS — F79 MENTAL RETARDATION: ICD-10-CM

## 2018-10-25 DIAGNOSIS — G40.909 SEIZURE DISORDER: ICD-10-CM

## 2018-10-25 DIAGNOSIS — E66.01 SEVERE OBESITY: ICD-10-CM

## 2018-10-25 PROCEDURE — G0439 PPPS, SUBSEQ VISIT: HCPCS | Mod: ,,, | Performed by: NURSE PRACTITIONER

## 2018-10-25 PROCEDURE — 99213 OFFICE O/P EST LOW 20 MIN: CPT | Mod: PBBFAC | Performed by: NURSE PRACTITIONER

## 2018-10-25 PROCEDURE — 99999 PR PBB SHADOW E&M-EST. PATIENT-LVL III: CPT | Mod: PBBFAC,,, | Performed by: NURSE PRACTITIONER

## 2018-10-25 NOTE — PROGRESS NOTES
"Malorie Taylor presented for a  Medicare AWV and comprehensive Health Risk Assessment today. The following components were reviewed and updated:    · Medical history  · Family History  · Social history  · Allergies and Current Medications  · Health Risk Assessment  · Health Maintenance  · Care Team     ** See Completed Assessments for Annual Wellness Visit within the encounter summary.**       The following assessments were completed:  · Living Situation  · CAGE  · Depression Screening  · Timed Get Up and Go  · Whisper Test  · Cognitive Function Screening*mental disorder  · Nutrition Screening  · ADL Screening  · PAQ Screening    Vitals:    10/25/18 1311   BP: 113/68   Pulse: 66   Temp: 98.3 °F (36.8 °C)   SpO2: 99%   Weight: 108.9 kg (240 lb 1.3 oz)   Height: 5' 5" (1.651 m)     Body mass index is 39.95 kg/m².  Physical Exam   Constitutional: She appears well-developed and well-nourished.   HENT:   Head: Normocephalic and atraumatic.   Nose: Nose normal.   Eyes: Conjunctivae and EOM are normal.   Cardiovascular: Normal rate, regular rhythm, normal heart sounds and intact distal pulses.   No murmur heard.  Pulmonary/Chest: Effort normal and breath sounds normal.   Musculoskeletal: Normal range of motion.   Neurological: She is alert.   Skin: Skin is warm and dry.   Psychiatric: She has a normal mood and affect. Her behavior is normal. Thought content normal. Cognition and memory are impaired.   Nursing note and vitals reviewed.        Diagnoses and health risks identified today and associated recommendations/orders:    1. Encounter for preventive health examination  Assessment performed. Health maintenance updated. Chart review completed.  Obtain records for flu and tetanus vaccine. Patient had to have these in order to work at the .    2. Seizure disorder  Chronic. Stable on current regimen. Followed by Neurology.    3. Mental retardation  Chronic. Stable on current regimen. Followed by Neurology.    4. Severe " obesity  Chronic. Continue working out daily.   Limit carbohydrates.  Followed by PCP.      Provided Malorie with a 5-10 year written screening schedule and personal prevention plan. Recommendations were developed using the USPSTF age appropriate recommendations. Education, counseling, and referrals were provided as needed. After Visit Summary printed and given to patient which includes a list of additional screenings\tests needed.    Follow-up for follow up with Primary Care Provider as instructed, ;sooner if problems, HRA in 1 year.    MAREK Duran

## 2018-10-25 NOTE — PATIENT INSTRUCTIONS
Counseling and Referral of Other Preventative  (Italic type indicates deductible and co-insurance are waived)    Patient Name: Malorie Taylor  Today's Date: 10/25/2018    Health Maintenance       Date Due Completion Date    TETANUS VACCINE 03/07/1998 ---    Pap Smear with HPV Cotest 01/24/2021 1/24/2018        No orders of the defined types were placed in this encounter.    The following information is provided to all patients.  This information is to help you find resources for any of the problems found today that may be affecting your health:                Living healthy guide: www.[facility].[place].[facility]      Understanding Diabetes: www.diabetes.org      Eating healthy: www.cdc.gov/healthyweight      CDC home safety checklist: www.cdc.gov/steadi/patient.html      Agency on Aging: www.goea.[place].[facility]      Alcoholics anonymous (AA): www.aa.org      Physical Activity: www.paola.nih.gov/mt8tdha      Tobacco use: www.quitwithusla.org

## 2018-10-31 NOTE — PROGRESS NOTES
EPILEPSY CLINIC:   FOLLOW UP VISIT    Name: Malorie Taylor  MRN:[id number]   [facility]: [id number]    Date of service: 10/30/2018  Last clinic visit: 10/10/17  Prior clinic visit: 4/12/17 ()    Age:38 y.o.   Gender:female     The patient is here today with her father  History obtained from the patient, her father and prior notes    CHIEF COMPLAINT:   - follow-up of seizures    INTERVAL HISTORY (Since last visit):    This is a 38 y.o.  right handed female who presents for follow-up of seizures    No seizures since last visit; last sz was > 12 -13 years ago; doing well.  Attends local center for work    Current AEDs: compliant  1) Topiramate 200 mg bid  2) Carbamazepine 400 mg bid   3) Gabapentin 1200 mg tid    No levels since 2008 - per father levels are chkd by PCP    Notes from last clinic visit, 10/10/17:  Controlled on her current AED regimen with no seizures > 12 years  - per father addition of Topiramate controlled the seizures    Brief seizure history:  Parents noted 1st seizure at age 7 years - but noted by 3 siblings (seperated by ~ 1 year each; 2 older and 1 younger) from earlier on (but unclear re exact onset)   Seizure types:  1) Blank stare: eyes open, lasting few seconds and unresponsive  2) Confusional episodes: more common  - GTC seizure occurred only when attempted to take off/taper AEDs   No hx of SE or admission into hospital for seizures.    SEIZURE HISTORY: Clinic note, 4/12/2017 ():  Malorie Taylor returns to Neurology Clinic for medical management of her seizure disorder.    In spite of missing a dose of medication, there have been no interval seizures.  She is accompanied to the office by her father who helps provide some of the history.  She does not report any current health issues.  IMPRESSION AND PLAN:  In conclusion, she continues to do well from the standpoint of her seizure disorder.  Her medication will be continued.    I am prescribing Topamax 200 mg twice a day, carbamazepine  400 mg twice a day, gabapentin 1200 mg three times a day.    She will need to follow up with her primary care doctor, Dr. Sapp for her anemia.    Her next appointment with me will be in six months.  She may call if there is a question in the interim    RELEVANT LABS AND TESTS SINCE LAST VISIT:   Additional tests:  1)CT Scan: normal, per father   2) EEG\Video Monitoring:no   3) PET Scan: no  4) Neuropsychological evaluation: no  5) DEXA Scan: no  6) Others: no    RISK FACTORS FOR SEIZURES:    1. Head Trauma:  No    2. CNS Infections:  No  3. CNS Tumors: No     4. CNS Vascular Disease: No     5. Febrile Seizures: No    6. Developmental Delay: Yes - required Sp Ed       7. Family History of Seizures: Yes : paternal GM - one of a twin, who had seizures   8. Birth history: FT CS (prior CS)    Pregnancy/Labor/Delivery: n/a    CURRENT MEDICATIONS:   Current Outpatient Medications   Medication Sig Dispense Refill    carbamazepine (TEGRETOL XR) 400 MG Tb12 Take 1 tablet (400 mg total) by mouth 2 (two) times daily. 1 Tablet Sustained Release 12 hr Oral Twice a day 180 tablet 4    ciclopirox (PENLAC) 8 % Soln Apply daily to affected nail. Must remove and restart weekly 1 Bottle 5    ferrous gluconate (FERGON) 325 MG Tab Take 1 tablet (325 mg total) by mouth daily with breakfast.  0    fish oil-omega-3 fatty acids 300-1,000 mg capsule Take 1 g by mouth once daily.        gabapentin (NEURONTIN) 600 MG tablet Take 2 tablets (1,200 mg total) by mouth 3 (three) times daily. 2 Tablet Oral Three times a day 540 tablet 4    multivitamin capsule Take 1 capsule by mouth once daily.      topiramate (TOPAMAX) 200 MG Tab Take 1 tablet (200 mg total) by mouth 2 (two) times daily. 180 tablet 4    vitamin D 1000 units Tab Take 185 mg by mouth once daily.       No current facility-administered medications for this visit.       Not on any contraception    CURRENT ANTI EPILEPTIC MEDICATIONS:    1) Topiramate 200 mg bid  2)  Carbamazepine 400 mg bid   3) Gabapentin 1200 mg tid    Results for LIZA DAVIS (MRN [id number]) as of 10/30/2018 19:32   Ref. Range 1/24/2006 08:03 3/6/2008 14:20 9/22/2008 13:28 10/8/2008 07:12   Carbamazepine Lvl Latest Ref Range: 6.0 - 12.0 ug/ml 6.5 (L)  10.7    Gabapentin Lvl Latest Ref Range: 2 - 12.0 ug/mL  13.7 (H) 16.0 (H)    Lamotrigine Lvl Latest Units: ug/mL  <0.2     Topiramate Lvl Latest Ref Range: 2 - 12.0 ug/mL  7.7 Test Not Performed 12.9 (H)       VAGAL NERVE STIMULATOR: n/a    PRIOR ANTICONVULSANT HISTORY: tried several prior - not sure which ones; father remembers PB (had an allergic reaction)      PAST MEDICAL HISTORY:   Active Ambulatory Problems     Diagnosis Date Noted    Seizure disorder 08/10/2012    Mental retardation 08/26/2013    Kyphosis 08/26/2013    Severe obesity 10/31/2015     Resolved Ambulatory Problems     Diagnosis Date Noted    No Resolved Ambulatory Problems     Past Medical History:   Diagnosis Date    Allergy     Obesity 10/31/2015    Seizures         PAST PSYCHIATRIC HISTORY:  Mood swings during menstrual periods    PAST SURGICAL HISTORY including Epilepsy surgery: No past surgical history on file.     FAMILY HISTORY:   Family History   Problem Relation Age of Onset    No Known Problems Mother     Heart disease Father     No Known Problems Sister     No Known Problems Brother     No Known Problems Brother     Acne Neg Hx     Eczema Neg Hx     Lupus Neg Hx     Psoriasis Neg Hx     Melanoma Neg Hx          SOCIAL HISTORY:   Social History     Socioeconomic History    Marital status: Single     Spouse name: Not on file    Number of children: Not on file    Years of education: Not on file    Highest education level: Not on file   Social Needs    Financial resource strain: Not on file    Food insecurity - worry: Not on file    Food insecurity - inability: Not on file    Transportation needs - medical: Not on file    Transportation needs -  non-medical: Not on file   Occupational History    Occupation: Housekeeping   Tobacco Use    Smoking status: Never Smoker    Smokeless tobacco: Never Used   Substance and Sexual Activity    Alcohol use: No    Drug use: Not on file    Sexual activity: Not on file   Other Topics Concern    Are you pregnant or think you may be? Not Asked    Breast-feeding Not Asked   Social History Narrative    Not on file      a) Marital status: Single                                                    b) Living situation: patient lives with parents  c) Employed/Unemployed/Other: Employed part time - works ~ 2 hours per day (housekeeping) at the [employer]    DRIVING HISTORY:  Currently driving: No      LEVEL OF EDUCATION: Sp Ed    SUBSTANCE USE: no hx of tobacco, etoh or other substance use  ALLERGIES: Phenobarbital     REVIEW OF SYSTEMS:  Review of Systems     GENERAL EXAMINATION:  LMP 10/18/2018      GENERAL EXAMINATION:  General Appearance:    This is an average built female who appears well.  HEENT: There are mild dysmorphic features  Skin: There are no obvious stigmata for neurocutaneous disorders.  Neck: supple   Cardiovascular: regular rate and rhythm  Lungs: clear  Abdomen: deferred  The spine is non-tender.  Kyphosis:  NoScoliosis: No   Extremities: Warm and well perfused    NEUROLOGICAL EXAMINATION:  Mental status: Alert, appropriate and pleasant   Speech: normal  Dysarthria: No   Eyes: PERRL; EOM intact; No nystagmus.The visual pursuits  were smooth with normal saccadic eye movements.   Fundoscopic Exam: not tested  No facial asymmetry. Intact facial sensation bilaterally.  Hearing was intact bilaterally to finger rub  Tongue and palate was in the midline  Intact SCM and trapezii bilaterally     Motor examination:  Normal bulk and tone bilaterally. Power: no pronater drift; 5/5 bilaterally symmetric UE/LE  Abnormal movements: none  Deep tendon reflexes: 2+ bilaterally symmetric UE/LE with flexor  plantars  Dysmetria: No     Sensory examination:   Not assessed at this time    Gait:  Normal gait and station    IMPRESSION:  The patient's history is consistent with:   Seizure disorder  39yo F with hx of MR and seizures since age ~7years  - controlled on current AED regimen with no seizures > 12 years    Plan:  - continue same regimen:  1) Topiramate 200 mg bid  2) Carbamazepine 400 mg bid   3) Gabapentin 1200 mg tid  - check AED levels: pt will have PCP visit soon - will get it done and send us the results    Return in 1 year or earlier prn    Plan of care was discussed with patient and her father.    The patient was asked to call me/the clinic 1 week after the test(s) are done to obtain results.  More than 50% of the 30 minutes spent with the patient (as well as family/caregiver(s) was spent on face-to-face counseling about:  1. Diagnosis, plans, prognosis, medications and possible side-effects, risks and benefits of treatment, other alternatives to AEDs.  2. Risks related to continued seizures, status epilepticus, SUDEP, driving restrictions and seizure precautions ( no baths but showers are ok, no swimming unsupervised, no use of heavy machinery, no use of sharp moving objects, avoid heights).   3. Issues related to pregnancy, OCP and breast feeding as it relates to epilepsy (in female patients).  4. The potential of teratogenicity (for female patients) and suicidal risks of anti-epileptic medications.  5.Avoid any activity that compromise patient safety related to seizures.    Questions and concerns raised by the patient and family/care-giver(s) were addressed and they indicated understanding of everything discussed and agreed to plans as above.    Return in 1 year or earlier prn    Karly Shankar MD, ESTEFANY(), FACNS.  Neurology-Epilepsy.

## 2018-11-01 ENCOUNTER — OFFICE VISIT (OUTPATIENT)
Dept: NEUROLOGY | Facility: CLINIC | Age: 38
End: 2018-11-01
Payer: MEDICARE

## 2018-11-01 VITALS
SYSTOLIC BLOOD PRESSURE: 106 MMHG | WEIGHT: 243.38 LBS | HEART RATE: 79 BPM | BODY MASS INDEX: 40.55 KG/M2 | HEIGHT: 65 IN | DIASTOLIC BLOOD PRESSURE: 68 MMHG

## 2018-11-01 DIAGNOSIS — G40.909 SEIZURE DISORDER: ICD-10-CM

## 2018-11-01 DIAGNOSIS — F79 MENTAL RETARDATION: Primary | ICD-10-CM

## 2018-11-01 PROCEDURE — 99215 OFFICE O/P EST HI 40 MIN: CPT | Mod: S$PBB,,, | Performed by: PSYCHIATRY & NEUROLOGY

## 2018-11-01 PROCEDURE — 99999 PR PBB SHADOW E&M-EST. PATIENT-LVL II: CPT | Mod: PBBFAC,,, | Performed by: PSYCHIATRY & NEUROLOGY

## 2018-11-01 PROCEDURE — 99212 OFFICE O/P EST SF 10 MIN: CPT | Mod: PBBFAC | Performed by: PSYCHIATRY & NEUROLOGY

## 2018-11-01 RX ORDER — CARBAMAZEPINE 400 MG/1
400 TABLET, EXTENDED RELEASE ORAL 2 TIMES DAILY
Qty: 180 TABLET | Refills: 4 | Status: SHIPPED | OUTPATIENT
Start: 2018-11-01 | End: 2019-11-25 | Stop reason: SDUPTHER

## 2018-11-01 RX ORDER — GABAPENTIN 600 MG/1
1200 TABLET ORAL 3 TIMES DAILY
Qty: 540 TABLET | Refills: 4 | Status: SHIPPED | OUTPATIENT
Start: 2018-11-01 | End: 2019-11-25 | Stop reason: SDUPTHER

## 2018-11-01 RX ORDER — TOPIRAMATE 200 MG/1
200 TABLET ORAL 2 TIMES DAILY
Qty: 180 TABLET | Refills: 4 | Status: SHIPPED | OUTPATIENT
Start: 2018-11-01 | End: 2019-11-25 | Stop reason: SDUPTHER

## 2018-11-01 NOTE — ASSESSMENT & PLAN NOTE
37yo F with hx of MR and seizures since age ~7years  - controlled on current AED regimen with no seizures > 12 years    Plan:  - continue same regimen:  1) Topiramate 200 mg bid  2) Carbamazepine 400 mg bid   3) Gabapentin 1200 mg tid  - check AED levels: pt will have PCP visit soon - will get it done and send us the results    Return in 1 year or earlier prn    Plan of care was discussed with patient and her father.

## 2018-11-01 NOTE — LETTER
November 1, 2018      Cece Winston MD  [address] [facility]  [address]  [facility] [address]           [facility] - Neurology Epilepsy  [address]  [facility] [address]  Phone: [phone]  Fax: [phone]          Patient: Malorie Taylor   MR Number: [id number]   YOB: 1980   Date of Visit: 11/1/2018       Dear Dr. Cece Winston:    Thank you for referring Malorie Taylor to me for evaluation. Attached you will find relevant portions of my assessment and plan of care.    If you have questions, please do not hesitate to call me. I look forward to following Malorie Taylor along with you.    Sincerely,    Karly Shankar MD    Enclosure  CC:  No Recipients    If you would like to receive this communication electronically, please contact [email or web address] or [phone] to request more information on Patient Feed Link access.    For providers and/or their staff who would like to refer a patient to [facility], please contact us through our one-stop-shop provider referral line, [facility], at [phone].    If you feel you have received this communication in error or would no longer like to receive these types of communications, please e-mail [email or web address]

## 2019-02-04 ENCOUNTER — LAB VISIT (OUTPATIENT)
Dept: LAB | Facility: HOSPITAL | Age: 39
End: 2019-02-04
Attending: FAMILY MEDICINE
Payer: MEDICARE

## 2019-02-04 DIAGNOSIS — E66.9 OBESITY, UNSPECIFIED CLASSIFICATION, UNSPECIFIED OBESITY TYPE, UNSPECIFIED WHETHER SERIOUS COMORBIDITY PRESENT: ICD-10-CM

## 2019-02-04 DIAGNOSIS — G40.909 SEIZURE DISORDER: ICD-10-CM

## 2019-02-04 DIAGNOSIS — R53.83 FATIGUE, UNSPECIFIED TYPE: ICD-10-CM

## 2019-02-04 DIAGNOSIS — Z00.00 ROUTINE GENERAL MEDICAL EXAMINATION AT A HEALTH CARE FACILITY: ICD-10-CM

## 2019-02-04 LAB
ALBUMIN SERPL BCP-MCNC: 3.5 G/DL
ALP SERPL-CCNC: 102 U/L
ALT SERPL W/O P-5'-P-CCNC: 15 U/L
ANION GAP SERPL CALC-SCNC: 7 MMOL/L
AST SERPL-CCNC: 20 U/L
BASOPHILS # BLD AUTO: 0.01 K/UL
BASOPHILS NFR BLD: 0.2 %
BILIRUB SERPL-MCNC: 0.3 MG/DL
BUN SERPL-MCNC: 10 MG/DL
CALCIUM SERPL-MCNC: 9.3 MG/DL
CHLORIDE SERPL-SCNC: 107 MMOL/L
CHOLEST SERPL-MCNC: 209 MG/DL
CHOLEST/HDLC SERPL: 3.3 {RATIO}
CO2 SERPL-SCNC: 22 MMOL/L
CREAT SERPL-MCNC: 0.7 MG/DL
DIFFERENTIAL METHOD: ABNORMAL
EOSINOPHIL # BLD AUTO: 0.2 K/UL
EOSINOPHIL NFR BLD: 3.9 %
ERYTHROCYTE [DISTWIDTH] IN BLOOD BY AUTOMATED COUNT: 12.1 %
EST. GFR  (AFRICAN AMERICAN): >60 ML/MIN/1.73 M^2
EST. GFR  (NON AFRICAN AMERICAN): >60 ML/MIN/1.73 M^2
GLUCOSE SERPL-MCNC: 82 MG/DL
HCT VFR BLD AUTO: 36.7 %
HDLC SERPL-MCNC: 64 MG/DL
HDLC SERPL: 30.6 %
HGB BLD-MCNC: 12 G/DL
IMM GRANULOCYTES # BLD AUTO: 0.01 K/UL
IMM GRANULOCYTES NFR BLD AUTO: 0.2 %
LDLC SERPL CALC-MCNC: 125.4 MG/DL
LYMPHOCYTES # BLD AUTO: 2 K/UL
LYMPHOCYTES NFR BLD: 39.4 %
MCH RBC QN AUTO: 34.8 PG
MCHC RBC AUTO-ENTMCNC: 32.7 G/DL
MCV RBC AUTO: 106 FL
MONOCYTES # BLD AUTO: 0.4 K/UL
MONOCYTES NFR BLD: 7.7 %
NEUTROPHILS # BLD AUTO: 2.5 K/UL
NEUTROPHILS NFR BLD: 48.6 %
NONHDLC SERPL-MCNC: 145 MG/DL
NRBC BLD-RTO: 0 /100 WBC
PLATELET # BLD AUTO: 274 K/UL
PMV BLD AUTO: 9.2 FL
POTASSIUM SERPL-SCNC: 4 MMOL/L
PROT SERPL-MCNC: 7.5 G/DL
RBC # BLD AUTO: 3.45 M/UL
SODIUM SERPL-SCNC: 136 MMOL/L
TRIGL SERPL-MCNC: 98 MG/DL
TSH SERPL DL<=0.005 MIU/L-ACNC: 1.92 UIU/ML
WBC # BLD AUTO: 5.08 K/UL

## 2019-02-04 PROCEDURE — 84443 ASSAY THYROID STIM HORMONE: CPT

## 2019-02-04 PROCEDURE — 36415 COLL VENOUS BLD VENIPUNCTURE: CPT | Mod: PO

## 2019-02-04 PROCEDURE — 80053 COMPREHEN METABOLIC PANEL: CPT

## 2019-02-04 PROCEDURE — 85025 COMPLETE CBC W/AUTO DIFF WBC: CPT

## 2019-02-04 PROCEDURE — 80061 LIPID PANEL: CPT

## 2019-02-08 ENCOUNTER — OFFICE VISIT (OUTPATIENT)
Dept: FAMILY MEDICINE | Facility: CLINIC | Age: 39
End: 2019-02-08
Payer: MEDICARE

## 2019-02-08 VITALS
HEART RATE: 60 BPM | DIASTOLIC BLOOD PRESSURE: 60 MMHG | WEIGHT: 251.56 LBS | HEIGHT: 66 IN | BODY MASS INDEX: 40.43 KG/M2 | TEMPERATURE: 98 F | SYSTOLIC BLOOD PRESSURE: 102 MMHG

## 2019-02-08 DIAGNOSIS — D64.9 ANEMIA, UNSPECIFIED TYPE: Primary | ICD-10-CM

## 2019-02-08 PROCEDURE — 99395 PREV VISIT EST AGE 18-39: CPT | Mod: S$PBB,,, | Performed by: FAMILY MEDICINE

## 2019-02-08 PROCEDURE — 99213 OFFICE O/P EST LOW 20 MIN: CPT | Mod: PBBFAC,PO | Performed by: FAMILY MEDICINE

## 2019-02-08 PROCEDURE — 99999 PR PBB SHADOW E&M-EST. PATIENT-LVL III: CPT | Mod: PBBFAC,,, | Performed by: FAMILY MEDICINE

## 2019-02-08 PROCEDURE — 99999 PR PBB SHADOW E&M-EST. PATIENT-LVL III: ICD-10-PCS | Mod: PBBFAC,,, | Performed by: FAMILY MEDICINE

## 2019-02-08 PROCEDURE — 99395 PR PREVENTIVE VISIT,EST,18-39: ICD-10-PCS | Mod: S$PBB,,, | Performed by: FAMILY MEDICINE

## 2019-02-08 RX ORDER — FOLIC ACID 1 MG/1
1 TABLET ORAL DAILY
Qty: 100 TABLET | Refills: 3 | Status: SHIPPED | OUTPATIENT
Start: 2019-02-08 | End: 2022-03-09

## 2019-02-08 NOTE — PATIENT INSTRUCTIONS
Low-Fat Cooking Tips  To eat less fat, you may need to learn some new ways to cook. But that doesn't mean you have to eat bland, boring food. And it doesn't mean cooking needs to take any more time. Here are some tips for cooking and seasoning foods with less fat.     Broil fish instead of frying it. And sprinkle on herbs to add flavor.    Try New Cooking Methods  · Broil, roast, bake, steam, or microwave fish, chicken, turkey, and meat.  · Remove skin from chicken and turkey and trim extra fat from meat before cooking.  · Sprinkle herbs on meat, chicken, and fish, and in soups.  · Cook in broth instead of fat.  · Use nonstick cooking sprays or nonstick pans.  · Steam or microwave vegetables without adding fat. Serve with herbs, lemon juice, vinegar, or fat-free butter-flavored powder.  · To flavor beans and rice, add chopped onions, garlic, and peppers.  · Chill soups and stews. Before reheating and serving, skim off the fat.  · When you add fat, use canola or olive oil instead of butter or lard.  Lighten Up Your Recipes  · In soups and sauces: Replace whole milk or cream with low-fat milk, evaporated fat-free milk, or nonfat dry milk.  · In puddings and other desserts: Replace whole milk or cream with low-fat milk or fat-free condensed milk.  · To make dips and toppings: Use low-fat or nonfat cottage cheese or sour cream.  · To make salad dressings: Use nonfat yogurt or low-fat buttermilk.  · In place of 1 whole egg in recipes: Use 2 egg whites or 1/4 cup egg substitute.  · In place of regular cheese: Use fat-free or reduced-fat cheese.  Date Last Reviewed: 5/11/2015  © 5658-8247 [employer]. [address] [place] [address], [place] [address]. All rights reserved. This information is not intended as a substitute for professional medical care. Always follow your healthcare professional's instructions.        Low-Fat Diet    A low-fat diet will help you lose weight. It also can lower cholesterol and prevent  symptoms of gallbladder disease. The average American diet contains up to 50% fat. This means that 50% of all calories come from fat (about 80 grams to 100 grams of fat per day). Choosing normal portions of foods from the list below can help lower your fat intake. Experts recommend that only 20% to 35% of your daily calories come from fat. The remaining 65% to 80% of calories will come from protein and carbohydrates. This is much healthier for you.  Breads  Ok: Whole-wheat or rye bread, braeden or soda crackers, jess toast, plain rolls, bagels, English muffins  Avoid: Rolls and breads containing whole milk or egg; waffles, pancakes, biscuits, corn bread; cheese crackers, other flavored crackers, pastries, doughnuts  Cereals  Ok: Oatmeal, whole-wheat, bran, multigrain, rice  Avoid: Granola or other cereals that have oil, coconut, or more than 2 grams of fat per serving  Cheese and eggs  Ok: Cheeses labeled low-fat; 3 whole eggs per week; egg whites and egg substitutes as desired  Avoid: All other cheeses  Desserts  Ok: Gelatin, slushy, duy food cake, meringues, nonfat yogurt, and puddings or sherbet made with nonfat milk  Avoid: Any other store-bought desserts, or desserts that have fat, whole milk, cream, chocolate, and coconut  Drinks  Ok: Nonfat milk, coffee, tea, fizzy (carbonated) drinks  Avoid: Whole and reduced-fat milk, evaporated and condensed milk, hot chocolate mixes, milk shakes, malts, eggnog  Fats  Ok: You may have up to 3 teaspoons of fat daily. This can be butter, margarine, mayonnaise, or healthy oils (canola or olive)  Avoid: Cream, nondairy creams, cream cheese, gravies, and cream sauces  Fruits  Ok: All fruits made without fat  Avoid: Coconut, olives  Meats, poultry, fish  Ok: Limit meat to 6 ounces daily (broiled, roasted, baked, grilled, or boiled). Buy lean cuts, and trim off the fat. Try beef, fish, lamb, pork, and canned fish packed in water; also chicken and turkey with the skin  removed.  Avoid: Fried meats, fish, or poultry; fried eggs, and fish canned in oils; fatty meats such as silva, sausage, corned beef, hot dogs, and lunch meats; meats with gravies and sauces  Potatoes, beans, pasta  Ok: Dried beans, split peas, lentils, potatoes, rice, pasta made without added fat  Avoid: French fries, potato chips, potatoes prepared with butter, refried beans  Soups  Ok: Clear broth soups without fat and with allowed vegetables  Avoid: Cream-based soups  Vegetables  Ok: Fresh, frozen, canned or dried vegetables, all made without added fat  Avoid: Fried vegetables and those prepared with butter, cream, sauces  Miscellaneous  Ok: Salt, sugar, jelly, hard candy, marshmallows, honey, syrup, spices and herbs, mustard, ketchup, lemon, and vinegar. Try to limit sweets and added sugars.  Avoid: Chocolate, nuts, coconut, and cream candies; sunflower, sesame, and other seeds; fried foods; cream sauces and gravies; pizza  Date Last Reviewed: 8/1/2016  © 6736-4750 [employer]. [address] [place] [address], [place] [address]. All rights reserved. This information is not intended as a substitute for professional medical care. Always follow your healthcare professional's instructions.        Adding Flavor to Low-Fat Meals    There are endless ways to add more variety and flavor to your diet. You dont need salt or high-fat additions.  · Keep plenty of fresh fruit on hand. Try adding it to your main dishes. For example, peaches go well with chicken. They can be very flavorful in casseroles or with roasted poultry. Bananas or raisins add flavor to corrales dishes with a Yadiel theme.  · Buy fruits and vegetables you havent tried before. Often, recipes or suggestions for their use will be listed in the produce section at the grocery store. This is often the case with more exotic or rare foods. Perrpers can add sweet or hot characteristics to your dish.  · Go to the cookbook section at the bookstore or  library. Many of the unique flavors we associate with Nauruan, , or Yadiel dishes come from the seasonings used in their recipes. Try one new recipe a week. Youll soon know what spices to add to a dish to give it the taste of exotic places.  · Marinate meats, poultry, and fish before grilling or baking. Try mixtures of wine, fruit juice, low-sodium tomato juice, vinegar, lemon juice, herbs, and spices. Be aware that soy sauce and teriyaki sauce are high in sodium. Use low-sodium versions, and use less of them. Kinza, dry alba, and sesame seeds can add Asian flavors to foods.  · High-heat cooking. Consider cooking meat, poultry and fish by pan-searing, grilling, or broiling. These methods use high-heat and add flavor.  · Hit the juice. Add citrus juice or peel to help boost flavor.  · Ursina it up. Grilling vegetables add a different layer of flavor than other cooking methods.  Date Last Reviewed: 6/8/2015  © 9432-9737 [employer]. [address] [place] [address], [place] [address]. All rights reserved. This information is not intended as a substitute for professional medical care. Always follow your healthcare professional's instructions.

## 2019-02-09 NOTE — PROGRESS NOTES
Malorie Taylor is a 38 y.o. female   Routine physical  Source of history: Patient  Past Medical History:   Diagnosis Date    Allergy     Obesity 10/31/2015    Seizures     epilepsy     Patient  reports that  has never smoked. she has never used smokeless tobacco. She reports that she does not drink alcohol.  Family History   Problem Relation Age of Onset    No Known Problems Mother     Heart disease Father     No Known Problems Sister     No Known Problems Brother     No Known Problems Brother     Acne Neg Hx     Eczema Neg Hx     Lupus Neg Hx     Psoriasis Neg Hx     Melanoma Neg Hx      ROS:  GENERAL: No fever, chills, fatigability or weight loss.  SKIN: No rashes, itching or changes in color or texture of skin.  HEAD: No headaches or recent head trauma.  EYES: Visual acuity fine. No photophobia, ocular pain or diplopia.  EARS: Denies ear pain, discharge or vertigo.  NOSE: No loss of smell, no epistaxis or postnasal drip.  MOUTH & THROAT: No hoarseness or change in voice. No excessive gum bleeding.  NODES: Denies swollen glands.  CHEST: Denies VILLELA, cyanosis, wheezing, cough and sputum production.  CARDIOVASCULAR: Denies chest pain, PND, orthopnea or reduced exercise tolerance.  ABDOMEN: Appetite fine. No weight loss. Denies diarrhea, abdominal pain, hematemesis or blood in stool.  URINARY: No flank pain, dysuria or hematuria.  PERIPHERAL VASCULAR: No claudication or cyanosis.  MUSCULOSKELETAL: No joint stiffness or swelling. Denies back pain.  NEUROLOGIC: No history of seizures, paralysis, alteration of gait or coordination.    OBJECTIVE:  APPEARANCE:  Overweight no acute distress  Vitals:    02/08/19 1309   BP: 102/60   Pulse: 60   Temp: 97.5 °F (36.4 °C)     SKIN: Normal skin turgor, no lesions.  HEENT: Both external auditory canals clear. Both tympanic membranes intact. PERRL. EOMI.   Disk margins sharp. No tonsillar enlargement. No pharyngeal erythema or exudate. No stridor.  NECK: No bruits.  No cervical spine tenderness. No cervical lymphadenopathy. No thyromegaly.  NODES: No cervical, axillary or inguinal lymph node enlargement.  CHEST: Breath sounds clear bilaterally. Lungs clear to auscultation & percussion.   Good air movement. No rales. No retractions. No rhonchi. No stridor. No wheezes.  CARDIOVASCULAR: Normal S1, S2. No murmurs. No edema.  BREASTS: no masses palpated in either breast or axillary area, symmetry noted.  ABDOMEN: Bowel sounds normal. No palpable aortic enlargement. No CVA tenderness. No pulsatile mass. No rebound tenderness.  PERIPHERAL VASCULAR: Femoral pulses present and symmetrical. No edema.  MUSCULOSKELETAL: Degenerative changes of both ankles, foot, knee, wrist and hand.  BACK: No CVA tenderness. There is no spasm, tenderness or radiculopathy noted with palpation and there is full range of motion.   NEUROLOGIC:   Cranial Nerves: II-XII grossly intact.  Motor: 5/5 strength major flexors/extensors. No tremor.  DTR's: Knees, Ankles 2+ and equal bilaterally; downgoing toes.  Sensory: Intact to light touch distally.  Gait & Posture: Normal gait and fine motion. No cerebellar signs.  MENTAL STATUS: Alert. Oriented x 3. Language skills normal. Memory intact.  Well kept appearance.    ASSESSMENT/PLAN:   Malorie was seen today for annual exam.  Patient's labs reviewed hyperlipidemia mild, improving anemia  Diagnoses and all orders for this visit:    Anemia, unspecified type  -     folic acid (FOLVITE) 1 MG tablet; Take 1 tablet (1 mg total) by mouth once daily.           ferrous gluconate (FERGON) 325 MG Tab 325 mg, With breakfast         seizure disorder  carBAMazepine (TEGRETOL XR) 400 MG Tb12 400 mg, 2 times daily        Anticonvulsant - Monosaccharide Derivatives    topiramate (TOPAMAX) 200 MG Tab 200 mg, 2 times daily       HYPERLIPIDEMIA  Antihyperlipidemic Agents - Dietary Source Combinations    fish oil-omega-3 fatty acids 300-1,000 mg capsule 1 g, Daily   Patient will follow  low-fat diet decrease the amount of fast food she is eating  Patient's mother will help with this  Follow-up in 6 months to re-evaluate lipids

## 2019-02-09 NOTE — PROGRESS NOTES
Malorie Taylor is a 38 y.o. female   Routine physical  Source of history: Patient  Past Medical History:   Diagnosis Date    Allergy     Obesity 10/31/2015    Seizures     epilepsy     Patient  reports that  has never smoked. she has never used smokeless tobacco. She reports that she does not drink alcohol.  Family History   Problem Relation Age of Onset    No Known Problems Mother     Heart disease Father     No Known Problems Sister     No Known Problems Brother     No Known Problems Brother     Acne Neg Hx     Eczema Neg Hx     Lupus Neg Hx     Psoriasis Neg Hx     Melanoma Neg Hx      ROS:  GENERAL: No fever, chills, fatigability or weight loss.  SKIN: No rashes, itching or changes in color or texture of skin.  HEAD: No headaches or recent head trauma.  EYES: Visual acuity fine. No photophobia, ocular pain or diplopia.  EARS: Denies ear pain, discharge or vertigo.  NOSE: No loss of smell, no epistaxis or postnasal drip.  MOUTH & THROAT: No hoarseness or change in voice. No excessive gum bleeding.  NODES: Denies swollen glands.  CHEST: Denies VILLELA, cyanosis, wheezing, cough and sputum production.  CARDIOVASCULAR: Denies chest pain, PND, orthopnea or reduced exercise tolerance.  ABDOMEN: Appetite fine. No weight loss. Denies diarrhea, abdominal pain, hematemesis or blood in stool.  URINARY: No flank pain, dysuria or hematuria.  PERIPHERAL VASCULAR: No claudication or cyanosis.  MUSCULOSKELETAL: No joint stiffness or swelling. Denies back pain.  NEUROLOGIC: No history of seizures, paralysis, alteration of gait or coordination.    OBJECTIVE:  APPEARANCE:  Obese no acute distress  Vitals:    02/08/19 1309   BP: 102/60   Pulse: 60   Temp: 97.5 °F (36.4 °C)     SKIN: Normal skin turgor, no lesions.  HEENT: Both external auditory canals clear. Both tympanic membranes intact. PERRL. EOMI. Disk margins sharp. No tonsillar enlargement. No pharyngeal erythema or exudate. No stridor.  NECK: No bruits. No  cervical spine tenderness. No cervical lymphadenopathy. No thyromegaly.  NODES: No cervical, axillary or inguinal lymph node enlargement.  CHEST: Breath sounds clear bilaterally. Lungs clear to auscultation & percussion. Good air movement. No rales. No retractions. No rhonchi. No stridor. No wheezes.  CARDIOVASCULAR: Normal S1, S2. No murmurs. No edema.  BREASTS: no masses palpated in either breast or axillary area, symmetry noted.  ABDOMEN: Bowel sounds normal. No palpable aortic enlargement. No CVA tenderness. No pulsatile mass. No rebound tenderness.  PELVIC: Done at gyn exam  GENITALIA: Done at gyn exam  RECTAL: See above  PERIPHERAL VASCULAR: Femoral pulses present and symmetrical. No edema.  MUSCULOSKELETAL: Degenerative changes of both ankles, foot, knee, wrist and hand.  BACK: No CVA tenderness. There is no spasm, tenderness or radiculopathy noted with palpation and there is full range of motion.   NEUROLOGIC:   Cranial Nerves: II-XII grossly intact.  Motor: 5/5 strength major flexors/extensors. No tremor.  DTR's: Knees, Ankles 2+ and equal bilaterally; downgoing toes.  Sensory: Intact to light touch distally.  Gait & Posture: Normal gait and fine motion. No cerebellar signs.  MENTAL STATUS: Alert. Oriented x 3. Language skills normal. Memory intact. No suicidal ideation. Normal affect. Normal cognitive functions. Normal serial 7s. Can do simple math. Well kept appearance.    ASSESSMENT/PLAN:   Malorie was seen today for annual exam.    Diagnoses and all orders for this visit:    Anemia, unspecified type  -     folic acid (FOLVITE) 1 MG tablet; Take 1 tablet (1 mg total) by mouth once daily.

## 2019-05-06 ENCOUNTER — TELEPHONE (OUTPATIENT)
Dept: PRIMARY CARE CLINIC | Facility: CLINIC | Age: 39
End: 2019-05-06

## 2019-05-06 ENCOUNTER — OFFICE VISIT (OUTPATIENT)
Dept: PRIMARY CARE CLINIC | Facility: CLINIC | Age: 39
End: 2019-05-06
Payer: MEDICARE

## 2019-05-06 ENCOUNTER — LAB VISIT (OUTPATIENT)
Dept: LAB | Facility: HOSPITAL | Age: 39
End: 2019-05-06
Attending: INTERNAL MEDICINE
Payer: MEDICARE

## 2019-05-06 VITALS
TEMPERATURE: 99 F | HEIGHT: 66 IN | WEIGHT: 244.94 LBS | HEART RATE: 68 BPM | SYSTOLIC BLOOD PRESSURE: 116 MMHG | BODY MASS INDEX: 39.36 KG/M2 | DIASTOLIC BLOOD PRESSURE: 90 MMHG

## 2019-05-06 DIAGNOSIS — K64.4 EXTERNAL HEMORRHOID, BLEEDING: Primary | ICD-10-CM

## 2019-05-06 DIAGNOSIS — K64.4 EXTERNAL HEMORRHOID, BLEEDING: ICD-10-CM

## 2019-05-06 LAB
BASOPHILS # BLD AUTO: 0.01 K/UL (ref 0–0.2)
BASOPHILS NFR BLD: 0.1 % (ref 0–1.9)
DIFFERENTIAL METHOD: ABNORMAL
EOSINOPHIL # BLD AUTO: 0.1 K/UL (ref 0–0.5)
EOSINOPHIL NFR BLD: 1.9 % (ref 0–8)
ERYTHROCYTE [DISTWIDTH] IN BLOOD BY AUTOMATED COUNT: 12.2 % (ref 11.5–14.5)
HCT VFR BLD AUTO: 38.7 % (ref 37–48.5)
HGB BLD-MCNC: 12.7 G/DL (ref 12–16)
IMM GRANULOCYTES # BLD AUTO: 0.02 K/UL (ref 0–0.04)
IMM GRANULOCYTES NFR BLD AUTO: 0.3 % (ref 0–0.5)
LYMPHOCYTES # BLD AUTO: 2.2 K/UL (ref 1–4.8)
LYMPHOCYTES NFR BLD: 33.3 % (ref 18–48)
MCH RBC QN AUTO: 34.9 PG (ref 27–31)
MCHC RBC AUTO-ENTMCNC: 32.8 G/DL (ref 32–36)
MCV RBC AUTO: 106 FL (ref 82–98)
MONOCYTES # BLD AUTO: 0.5 K/UL (ref 0.3–1)
MONOCYTES NFR BLD: 8 % (ref 4–15)
NEUTROPHILS # BLD AUTO: 3.8 K/UL (ref 1.8–7.7)
NEUTROPHILS NFR BLD: 56.4 % (ref 38–73)
NRBC BLD-RTO: 0 /100 WBC
PLATELET # BLD AUTO: 266 K/UL (ref 150–350)
PMV BLD AUTO: 9.2 FL (ref 9.2–12.9)
RBC # BLD AUTO: 3.64 M/UL (ref 4–5.4)
WBC # BLD AUTO: 6.73 K/UL (ref 3.9–12.7)

## 2019-05-06 PROCEDURE — 99213 PR OFFICE/OUTPT VISIT, EST, LEVL III, 20-29 MIN: ICD-10-PCS | Mod: S$PBB,,, | Performed by: INTERNAL MEDICINE

## 2019-05-06 PROCEDURE — 85025 COMPLETE CBC W/AUTO DIFF WBC: CPT

## 2019-05-06 PROCEDURE — 99213 OFFICE O/P EST LOW 20 MIN: CPT | Mod: PBBFAC,PN | Performed by: INTERNAL MEDICINE

## 2019-05-06 PROCEDURE — 99999 PR PBB SHADOW E&M-EST. PATIENT-LVL III: ICD-10-PCS | Mod: PBBFAC,,, | Performed by: INTERNAL MEDICINE

## 2019-05-06 PROCEDURE — 99213 OFFICE O/P EST LOW 20 MIN: CPT | Mod: S$PBB,,, | Performed by: INTERNAL MEDICINE

## 2019-05-06 PROCEDURE — 36415 COLL VENOUS BLD VENIPUNCTURE: CPT | Mod: PN

## 2019-05-06 PROCEDURE — 99999 PR PBB SHADOW E&M-EST. PATIENT-LVL III: CPT | Mod: PBBFAC,,, | Performed by: INTERNAL MEDICINE

## 2019-05-06 RX ORDER — DOCUSATE SODIUM 100 MG/1
100 CAPSULE, LIQUID FILLED ORAL 2 TIMES DAILY PRN
Qty: 30 CAPSULE | Refills: 0 | Status: SHIPPED | OUTPATIENT
Start: 2019-05-06 | End: 2021-02-26

## 2019-05-06 RX ORDER — HYDROCORTISONE 25 MG/G
CREAM TOPICAL 2 TIMES DAILY
Qty: 3.5 G | Refills: 2 | Status: SHIPPED | OUTPATIENT
Start: 2019-05-06 | End: 2021-11-17 | Stop reason: SDUPTHER

## 2019-05-06 NOTE — PROGRESS NOTES
Subjective:       Patient ID: Malorie Taylor is a 39 y.o. female.    Chief Complaint: Rectal Bleeding (blood spotted in tissue after BM)    Last seen by PCP 3 months ago. Presents urgently today c/o rectal bleeding. Describes spots of bright red blood on the toilet tissue after bowel movements for the past few days. Mild anal pain. No history of colorectal disease, no previous similar episodes. Has been constipated for the past week, and had one particularly difficult bowel movement prior to onset of bleeding. No associated fever, chills, sweats, abdominal pain, N/V or diarrhea. No recent travel. Family history includes diverticulosis in mother, no GI malignancy or inflammatory bowel disease. Some relief with Preparation-H, few applications in past couple of days.     Past history, meds and allergies reviewed. No anticoagulants or regular use of NSAIDS.      Review of Systems   Neurological: Negative for dizziness, syncope and weakness.       Objective:    /90, Pulse 68, Temp 98.8  Physical Exam   Constitutional: She is oriented to person, place, and time. She appears well-developed and well-nourished. No distress.   HENT:   Nose: Nose normal.   Mouth/Throat: Oropharynx is clear and moist.   Cardiovascular: Normal rate, regular rhythm and normal heart sounds.   Pulmonary/Chest: Effort normal and breath sounds normal. No respiratory distress. She has no wheezes. She has no rales.   Genitourinary:   Genitourinary Comments: Visual inspection of external anal area reveals a small  inflamed external hemorrhoid to the right side that is tender, with stigmata of recent bleeding but no active bleeding at this time. No rash in the area, no evidence of infection including no induration of skin.    Musculoskeletal: Normal range of motion. She exhibits no edema.   Neurological: She is alert and oriented to person, place, and time.   Skin: Skin is warm and dry.       Assessment:       1. External hemorrhoid, bleeding         Plan:       External hemorrhoid, bleeding  -     CBC auto differential; Future; Expected date: 05/06/2019  -     docusate sodium (COLACE) 100 MG capsule; Take 1 capsule (100 mg total) by mouth 2 (two) times daily as needed for Constipation.  Dispense: 30 capsule; Refill: 0  -     hydrocortisone 2.5 % cream; Apply topically 2 (two) times daily. As needed for hemorrhoid pain and swelling.  Dispense: 3.5 g; Refill: 2  -     Stay well hydrated and have a high fiber diet for general bowel regularity.   Return as needed if no improvement or worsens.

## 2019-05-07 NOTE — TELEPHONE ENCOUNTER
Please report to patient and mother: blood cell count is stable, actually improved from the last one - no anemia, continue present care.

## 2019-05-27 ENCOUNTER — OFFICE VISIT (OUTPATIENT)
Dept: PRIMARY CARE CLINIC | Facility: CLINIC | Age: 39
End: 2019-05-27
Payer: MEDICARE

## 2019-05-27 VITALS
DIASTOLIC BLOOD PRESSURE: 74 MMHG | WEIGHT: 242.31 LBS | SYSTOLIC BLOOD PRESSURE: 108 MMHG | OXYGEN SATURATION: 98 % | HEART RATE: 97 BPM | BODY MASS INDEX: 40.37 KG/M2 | TEMPERATURE: 99 F | HEIGHT: 65 IN

## 2019-05-27 DIAGNOSIS — R10.32 LLQ PAIN: Primary | ICD-10-CM

## 2019-05-27 DIAGNOSIS — K59.00 CONSTIPATION, UNSPECIFIED CONSTIPATION TYPE: ICD-10-CM

## 2019-05-27 PROCEDURE — 99999 PR PBB SHADOW E&M-EST. PATIENT-LVL IV: ICD-10-PCS | Mod: PBBFAC,,, | Performed by: NURSE PRACTITIONER

## 2019-05-27 PROCEDURE — 99214 OFFICE O/P EST MOD 30 MIN: CPT | Mod: PBBFAC,PN | Performed by: NURSE PRACTITIONER

## 2019-05-27 PROCEDURE — 99213 OFFICE O/P EST LOW 20 MIN: CPT | Mod: S$PBB,,, | Performed by: NURSE PRACTITIONER

## 2019-05-27 PROCEDURE — 99999 PR PBB SHADOW E&M-EST. PATIENT-LVL IV: CPT | Mod: PBBFAC,,, | Performed by: NURSE PRACTITIONER

## 2019-05-27 PROCEDURE — 99213 PR OFFICE/OUTPT VISIT, EST, LEVL III, 20-29 MIN: ICD-10-PCS | Mod: S$PBB,,, | Performed by: NURSE PRACTITIONER

## 2019-05-27 NOTE — PROGRESS NOTES
Subjective:       Patient ID: Malorie Taylor is a 39 y.o. female.    Chief Complaint: Abdominal Pain (moved something heavy at work on Friday)    Ms. Taylor presents with her mother for LLQ abd pain that began Friday after lifting a very heavy box.  She has cognitive abnormalities that make providing a HPI or ROS difficult. Her mother helps her answer questions. Ms. Taylor reportsa that  the pain is not worse with movement or with eating. She has not had a bowel movement in 2 days, and recently stopped colace she'd been taking while dealing with a painful hemorrhoid.     Review of Systems   Constitutional: Negative for fever.   HENT: Negative for facial swelling.    Eyes: Negative for visual disturbance.   Respiratory: Negative for shortness of breath.    Cardiovascular: Negative for chest pain.   Gastrointestinal: Positive for abdominal pain and constipation. Negative for abdominal distention, diarrhea, nausea and vomiting.   Genitourinary: Negative for dysuria and frequency.   Musculoskeletal: Negative for gait problem.   Skin: Negative for rash.       Objective:      Physical Exam   Constitutional: She is oriented to person, place, and time. She appears well-developed. No distress.   obese   HENT:   Head: Normocephalic.   Eyes: No scleral icterus.   Cardiovascular: Normal rate, regular rhythm and normal heart sounds.   Pulmonary/Chest: Effort normal and breath sounds normal. No stridor. No respiratory distress. She has no wheezes. She has no rales.   Abdominal: Soft. Normal appearance and bowel sounds are normal. There is tenderness in the left lower quadrant.       Neurological: She is alert and oriented to person, place, and time.   Skin: Skin is warm and dry. She is not diaphoretic.   Psychiatric: She has a normal mood and affect. Her behavior is normal.   Nursing note and vitals reviewed.      Assessment:       1. LLQ pain    2. Constipation, unspecified constipation type        Plan:   1. LLQ pain  -  Reassurance given, will image if no improvement following treatment for constipation.     2. Constipation, unspecified constipation type  - Restart colace and gas ex.       Pt has been given instructions populated from [employer] database and has verbalized understanding of the after visit summary and information contained wherein.    Follow up with a primary care provider. May go to ER for acute shortness of breath, lightheadedness, fever, or any other emergent complaints or changes in condition.

## 2019-05-27 NOTE — PATIENT INSTRUCTIONS
- Restart colace, 1 -2 capsules daily  - Call if no improvement in 48 hours to arrange imaging.   - Heating pad, on low, as needed

## 2019-05-29 ENCOUNTER — TELEPHONE (OUTPATIENT)
Dept: PRIMARY CARE CLINIC | Facility: CLINIC | Age: 39
End: 2019-05-29

## 2019-05-29 ENCOUNTER — HOSPITAL ENCOUNTER (OUTPATIENT)
Dept: RADIOLOGY | Facility: OTHER | Age: 39
Discharge: HOME | End: 2019-05-29
Attending: NURSE PRACTITIONER
Payer: MEDICARE

## 2019-05-29 DIAGNOSIS — R10.32 LLQ PAIN: ICD-10-CM

## 2019-05-29 PROCEDURE — 74176 CT ABDOMEN PELVIS WITHOUT CONTRAST: ICD-10-PCS | Mod: 26,,, | Performed by: RADIOLOGY

## 2019-05-29 PROCEDURE — 74176 CT ABD & PELVIS W/O CONTRAST: CPT | Mod: TC

## 2019-05-29 PROCEDURE — 25500020 PHARM REV CODE 255: Performed by: NURSE PRACTITIONER

## 2019-05-29 PROCEDURE — 74176 CT ABD & PELVIS W/O CONTRAST: CPT | Mod: 26,,, | Performed by: RADIOLOGY

## 2019-05-29 RX ADMIN — IOHEXOL 30 ML: 350 INJECTION, SOLUTION INTRAVENOUS at 01:05

## 2019-05-29 NOTE — TELEPHONE ENCOUNTER
I spoke with the pt's mother, and she stated that the pt is still having abdominal pain. S/s haven't gotten worse but hasn't improved, pt didn't report ant new s/s. Pt's mother stated that she has had two large bowel movement, but that still didn't give her relief. Pt was instructed to f/u today if no improvement. Please advise.

## 2019-05-29 NOTE — TELEPHONE ENCOUNTER
----- Message from Robyn Marie sent at 5/29/2019  9:33 AM CDT -----  Contact: mother/Nelly/ [phone]  Patient would like to get medical advice.  Symptoms (please be specific):  Patient is still having the same symptoms, please call and advise mother with what will be the next steps.  How long has patient had these symptoms:    Pharmacy name and phone # (copy/paste from chart):    Any drug allergies (copy/paste from chart):      Would the patient rather a call back or a response via [facility]?:    Comments:

## 2019-05-30 ENCOUNTER — TELEPHONE (OUTPATIENT)
Dept: PRIMARY CARE CLINIC | Facility: CLINIC | Age: 39
End: 2019-05-30

## 2019-05-30 NOTE — TELEPHONE ENCOUNTER
Spoke with father Mr Taylor. Pain is improving but still there. No finding on CT to explain LLQ pain, favor muscle strain. Heat and ice. F/u by phone if no better. They will defer u/s for liver until speaking with PCP at annual visit since this area does not clinically correlate to pain she is having

## 2019-10-14 ENCOUNTER — OFFICE VISIT (OUTPATIENT)
Dept: PRIMARY CARE CLINIC | Facility: CLINIC | Age: 39
End: 2019-10-14
Payer: MEDICARE

## 2019-10-14 DIAGNOSIS — R30.0 DYSURIA: Primary | ICD-10-CM

## 2019-10-14 LAB
BACTERIA #/AREA URNS AUTO: ABNORMAL /HPF
BILIRUB UR QL STRIP: NEGATIVE
CLARITY UR REFRACT.AUTO: ABNORMAL
COLOR UR AUTO: ABNORMAL
GLUCOSE UR QL STRIP: NEGATIVE
HGB UR QL STRIP: NEGATIVE
KETONES UR QL STRIP: NEGATIVE
LEUKOCYTE ESTERASE UR QL STRIP: ABNORMAL
MICROSCOPIC COMMENT: ABNORMAL
NITRITE UR QL STRIP: POSITIVE
PH UR STRIP: 7 [PH] (ref 5–8)
PROT UR QL STRIP: NEGATIVE
RBC #/AREA URNS AUTO: 1 /HPF (ref 0–4)
SP GR UR STRIP: 1.01 (ref 1–1.03)
SQUAMOUS #/AREA URNS AUTO: 7 /HPF
URN SPEC COLLECT METH UR: ABNORMAL
WBC #/AREA URNS AUTO: 21 /HPF (ref 0–5)

## 2019-10-14 PROCEDURE — 99214 OFFICE O/P EST MOD 30 MIN: CPT | Mod: S$PBB,ICN,CMP, | Performed by: INTERNAL MEDICINE

## 2019-10-14 PROCEDURE — 99214 PR OFFICE/OUTPT VISIT, EST, LEVL IV, 30-39 MIN: ICD-10-PCS | Mod: S$PBB,ICN,CMP, | Performed by: INTERNAL MEDICINE

## 2019-10-14 PROCEDURE — 99999 PR PBB SHADOW E&M-EST. PATIENT-LVL IV: ICD-10-PCS | Mod: PBBFAC,,, | Performed by: INTERNAL MEDICINE

## 2019-10-14 PROCEDURE — 81001 URINALYSIS AUTO W/SCOPE: CPT

## 2019-10-14 PROCEDURE — 87147 CULTURE TYPE IMMUNOLOGIC: CPT

## 2019-10-14 PROCEDURE — 99999 PR PBB SHADOW E&M-EST. PATIENT-LVL IV: CPT | Mod: PBBFAC,,, | Performed by: INTERNAL MEDICINE

## 2019-10-14 PROCEDURE — 87086 URINE CULTURE/COLONY COUNT: CPT

## 2019-10-14 PROCEDURE — 99214 OFFICE O/P EST MOD 30 MIN: CPT | Mod: PBBFAC,PN | Performed by: INTERNAL MEDICINE

## 2019-10-14 PROCEDURE — 87088 URINE BACTERIA CULTURE: CPT

## 2019-10-14 RX ORDER — SULFAMETHOXAZOLE AND TRIMETHOPRIM 800; 160 MG/1; MG/1
1 TABLET ORAL 2 TIMES DAILY
Qty: 6 TABLET | Refills: 0 | Status: SHIPPED | OUTPATIENT
Start: 2019-10-14 | End: 2019-10-17

## 2019-10-14 NOTE — PATIENT INSTRUCTIONS
Sulfamethoxazole; Trimethoprim, SMX-TMP tablets  What is this medicine?  SULFAMETHOXAZOLE; TRIMETHOPRIM or SMX-TMP (suhl fuh meth OK benjie zohl; trye METH oh prim) is a combination of a sulfonamide antibiotic and a second antibiotic, trimethoprim. It is used to treat or prevent certain kinds of bacterial infections. It will not work for colds, flu, or other viral infections.  How should I use this medicine?  Take this medicine by mouth with a full glass of water. Follow the directions on the prescription label. Take your medicine at regular intervals. Do not take it more often than directed. Do not skip doses or stop your medicine early.  Talk to your pediatrician regarding the use of this medicine in children. Special care may be needed. This medicine has been used in children as young as 2 months of age.  What side effects may I notice from receiving this medicine?  Side effects that you should report to your doctor or health care professional as soon as possible:  · allergic reactions like skin rash or hives, swelling of the face, lips, or tongue  · breathing problems  · fever or chills, sore throat  · irregular heartbeat, chest pain  · joint or muscle pain  · pain or difficulty passing urine  · red pinpoint spots on skin  · redness, blistering, peeling or loosening of the skin, including inside the mouth  · unusual bleeding or bruising  · unusually weak or tired  · yellowing of the eyes or skin  Side effects that usually do not require medical attention (report to your doctor or health care professional if they continue or are bothersome):  · diarrhea  · dizziness  · headache  · loss of appetite  · nausea, vomiting  · nervousness  What may interact with this medicine?  Do not take this medicine with any of the following medications:  · aminobenzoate potassium  · dofetilide  · metronidazole  This medicine may also interact with the following medications:  · ACE inhibitors like benazepril, enalapril, lisinopril,  and ramipril  · birth control pills  · cyclosporine  · digoxin  · diuretics  · indomethacin  · medicines for diabetes  · methenamine  · methotrexate  · phenytoin  · potassium supplements  · pyrimethamine  · sulfinpyrazone  · tricyclic antidepressants  · warfarin  What if I miss a dose?  If you miss a dose, take it as soon as you can. If it is almost time for your next dose, take only that dose. Do not take double or extra doses.  Where should I keep my medicine?  Keep out of the reach of children.  Store at room temperature between 20 to 25 degrees C (68 to 77 degrees F). Protect from light. Throw away any unused medicine after the expiration date.  What should I tell my health care provider before I take this medicine?  They need to know if you have any of these conditions:  · anemia  · asthma  · being treated with anticonvulsants  · if you frequently drink alcohol containing drinks  · kidney disease  · liver disease  · low level of folic acid or glucose-6-phosphate dehydrogenase  · poor nutrition or malabsorption  · porphyria  · severe allergies  · thyroid disorder  · an unusual or allergic reaction to sulfamethoxazole, trimethoprim, sulfa drugs, other medicines, foods, dyes, or preservatives  · pregnant or trying to get pregnant  · breast-feeding  What should I watch for while using this medicine?  Tell your doctor or health care professional if your symptoms do not improve. Drink several glasses of water a day to reduce the risk of kidney problems.  Do not treat diarrhea with over the counter products. Contact your doctor if you have diarrhea that lasts more than 2 days or if it is severe and watery.  This medicine can make you more sensitive to the sun. Keep out of the sun. If you cannot avoid being in the sun, wear protective clothing and use a sunscreen. Do not use sun lamps or tanning beds/booths.  NOTE:This sheet is a summary. It may not cover all possible information. If you have questions about this  medicine, talk to your doctor, pharmacist, or health care provider. Copyright© 2017 [employer]

## 2019-10-14 NOTE — PROGRESS NOTES
[facility] Note    Chief Complaint      Chief Complaint   Patient presents with    Urinary Tract Infection       History of Present Illness      Malorie Taylor is a 39 y.o. female with Sz D/O, Intellectual Disability, Obesity presents with c/o Dysuria x 4 days    URI Sx's - + dysuria x 4 days.  No F/C.  No N/V.  No abdominal pain. No diarrhea.  Pt takes dulcolax daily prn constipation. Mom gave her Azo for the past 3 days.  She has never had a UTI before. No discharge. +Mid back pain when she puts her bra on.      Acc by her mom    HCM - Flu - Plans to get; Td - ?;  PAP - 1/24/18 - neg; PCP - Dr. Hernandez, Neuro - Dr. Shankar    Past Medical History:  Past Medical History:   Diagnosis Date    Allergy     Obesity 10/31/2015    Seizures     epilepsy       Past Surgical History:   has no past surgical history on file.    Family History:  family history includes Heart disease in her father; No Known Problems in her brother, brother, mother, and sister.     Social History:  Social History     Tobacco Use    Smoking status: Never Smoker    Smokeless tobacco: Never Used   Substance Use Topics    Alcohol use: Never     Frequency: Never    Drug use: Never       Review of Systems   Constitutional: Negative for chills and fever.   HENT: Negative for sore throat.    Respiratory: Positive for cough.         Dry cough - intermittent   Cardiovascular: Negative for chest pain.   Gastrointestinal: Positive for constipation. Negative for diarrhea, nausea and vomiting.   Genitourinary: Positive for dysuria.   Musculoskeletal: Positive for back pain.        Midback pain   Neurological: Negative for dizziness and headaches.        Medications:  Outpatient Encounter Medications as of 10/14/2019   Medication Sig Dispense Refill    carBAMazepine (TEGRETOL XR) 400 MG Tb12 Take 1 tablet (400 mg total) by mouth 2 (two) times daily. 1 Tablet Sustained Release 12 hr Oral Twice a day 180 tablet 4    docusate  sodium (COLACE) 100 MG capsule Take 1 capsule (100 mg total) by mouth 2 (two) times daily as needed for Constipation. 30 capsule 0    ferrous gluconate (FERGON) 325 MG Tab Take 1 tablet (325 mg total) by mouth daily with breakfast.  0    fish oil-omega-3 fatty acids 300-1,000 mg capsule Take 1 g by mouth 2 (two) times daily.       folic acid (FOLVITE) 1 MG tablet Take 1 tablet (1 mg total) by mouth once daily. 100 tablet 3    gabapentin (NEURONTIN) 600 MG tablet Take 2 tablets (1,200 mg total) by mouth 3 (three) times daily. 2 Tablet Oral Three times a day 540 tablet 4    multivitamin capsule Take 1 capsule by mouth once daily.      topiramate (TOPAMAX) 200 MG Tab Take 1 tablet (200 mg total) by mouth 2 (two) times daily. 180 tablet 4    vitamin D 1000 units Tab Take 185 mg by mouth once daily.      hydrocortisone 2.5 % cream Apply topically 2 (two) times daily. As needed for hemorrhoid pain and swelling. (Patient not taking: Reported on 10/14/2019) 3.5 g 2     No facility-administered encounter medications on file as of 10/14/2019.        Current Outpatient Medications:     carBAMazepine (TEGRETOL XR) 400 MG Tb12, Take 1 tablet (400 mg total) by mouth 2 (two) times daily. 1 Tablet Sustained Release 12 hr Oral Twice a day, Disp: 180 tablet, Rfl: 4    docusate sodium (COLACE) 100 MG capsule, Take 1 capsule (100 mg total) by mouth 2 (two) times daily as needed for Constipation., Disp: 30 capsule, Rfl: 0    ferrous gluconate (FERGON) 325 MG Tab, Take 1 tablet (325 mg total) by mouth daily with breakfast., Disp: , Rfl: 0    fish oil-omega-3 fatty acids 300-1,000 mg capsule, Take 1 g by mouth 2 (two) times daily. , Disp: , Rfl:     folic acid (FOLVITE) 1 MG tablet, Take 1 tablet (1 mg total) by mouth once daily., Disp: 100 tablet, Rfl: 3    gabapentin (NEURONTIN) 600 MG tablet, Take 2 tablets (1,200 mg total) by mouth 3 (three) times daily. 2 Tablet Oral Three times a day, Disp: 540 tablet, Rfl: 4     "multivitamin capsule, Take 1 capsule by mouth once daily., Disp: , Rfl:     topiramate (TOPAMAX) 200 MG Tab, Take 1 tablet (200 mg total) by mouth 2 (two) times daily., Disp: 180 tablet, Rfl: 4    vitamin D 1000 units Tab, Take 185 mg by mouth once daily., Disp: , Rfl:     hydrocortisone 2.5 % cream, Apply topically 2 (two) times daily. As needed for hemorrhoid pain and swelling. (Patient not taking: Reported on 10/14/2019), Disp: 3.5 g, Rfl: 2    Allergies:  Review of patient's allergies indicates:   Allergen Reactions    Phenobarbital      lethargy       Health Maintenance:    There is no immunization history on file for this patient.   Health Maintenance   Topic Date Due    TETANUS VACCINE  03/07/1998    Pap Smear with HPV Cotest  01/24/2021    Lipid Panel  Completed      Objective:      Vital Signs  Temp: (P) 98.1 °F (36.7 °C)  Temp src: (P) Oral  Pulse: (P) 80  Resp: (P) 15  SpO2: (P) 98 %  BP: (P) 120/79  BP Location: (P) Right arm  Patient Position: (P) Sitting  Pain Score: (P) 0-No pain  Height and Weight  Height: (P) 5' 5" (165.1 cm)  Weight: (P) 112 kg (246 lb 14.6 oz)  BSA (Calculated - sq m): (P) 2.27 sq meters  BMI (Calculated): (P) 41.2  Weight in (lb) to have BMI = 25: (P) 149.9]    Laboratory:  CBC:  Recent Labs   Lab 01/19/18  0800 02/04/19  0805 05/06/19  1428   WBC 6.53 5.08 6.73   RBC 3.53 L 3.45 L 3.64 L   Hemoglobin 12.0 12.0 12.7   Hematocrit 36.0 L 36.7 L 38.7   Platelets 258 274 266   Mean Corpuscular Volume 102 H 106 H 106 H   Mean Corpuscular Hemoglobin 34.0 H 34.8 H 34.9 H   Mean Corpuscular Hemoglobin Conc 33.3 32.7 32.8       CMP:  Recent Labs   Lab 11/29/16  0800  01/19/18  0800 02/04/19  0805   Glucose 77  --  91 82   Calcium 8.7  --  9.3 9.3   Albumin 3.5  --  3.5 3.5   Total Protein 7.1  --  7.7 7.5   Sodium 132 L   < > 136 136   Potassium 3.7  --  3.9 4.0   CO2 22 L  --  23 22 L   Chloride 103  --  106 107   BUN, Bld 13  --  13 10   Creatinine 0.7  --  0.8 0.7   eGFR if "  >60.0  --  >60.0 >60.0   eGFR if non African American >60.0  --  >60.0 >60.0   Alkaline Phosphatase 95  --  108 102   ALT 15  --  19 15   AST 20  --  20 20   Total Bilirubin 0.3  --  0.3 0.3    < > = values in this interval not displayed.       URINALYSIS:              LIPIDS:  Recent Labs   Lab 11/29/16  0800 01/19/18  0800 02/04/19  0805   TSH 1.967 3.090 1.920   HDL 64 60 64   Cholesterol 215 H 237 H 209 H   Triglycerides 55 84 98   LDL Cholesterol 140.0 160.2 H 125.4   Hdl/Cholesterol Ratio 29.8 25.3 30.6   Non-HDL Cholesterol 151 177 145   Total Cholesterol/HDL Ratio 3.4 4.0 3.3       TSH:  Recent Labs   Lab 11/29/16  0800 01/19/18  0800 02/04/19  0805   TSH 1.967 3.090 1.920       A1C:        Urine Microalbumin/Cr:  No results found for: MICALBCREAT    Other:   Lab Results   Component Value Date    [id number] 513 08/11/2012     No results found for: PSA, PSADIAG, HEPCAB    Physical Exam   Constitutional: She is oriented to person, place, and time. She appears well-developed and well-nourished. No distress.   HENT:   Head: Normocephalic and atraumatic.   Right Ear: External ear normal.   Left Ear: External ear normal.   Eyes: Pupils are equal, round, and reactive to light. Conjunctivae and EOM are normal.   Neck: Normal range of motion. Neck supple. No thyromegaly present.   Cardiovascular: Normal rate, regular rhythm, normal heart sounds and intact distal pulses.   No murmur heard.  Pulmonary/Chest: Effort normal and breath sounds normal. No respiratory distress.   Abdominal: Soft. Bowel sounds are normal. She exhibits no distension. There is no tenderness. There is no guarding.   Musculoskeletal: Normal range of motion. She exhibits no edema or deformity.   No CVA TTP   Neurological: She is alert and oriented to person, place, and time.   Skin: Skin is warm and dry. She is not diaphoretic.   Psychiatric: She has a normal mood and affect.           Assessment:       1. Dysuria        Malorie  Salome Taylor is a 39 y.o.female with:    1. Dysuria  -Will check Dip UA - + Leuk.  I will tx empirically with Bactrim DS 1 po BID x 3 days.  Pt is a poor historian but report some midback pain that has been going on for a while.  I really do not suspect she has Pyelonephritis.  No CVA TTP.  No N/V.  No F/C. If she continues to have Sx's she will RTC for another UA.     Chronic conditions status updated as per HPI.  Other than changes above, cont current medications and maintain follow up with specialists.  Return to clinic in 4 wks to fu with Dr. Hernandez.  Mom reports she is due for fu.     Ce Arciniega MD  [facility]

## 2019-10-15 LAB
BACTERIA UR CULT: ABNORMAL

## 2019-10-16 ENCOUNTER — TELEPHONE (OUTPATIENT)
Dept: PRIMARY CARE CLINIC | Facility: CLINIC | Age: 39
End: 2019-10-16

## 2019-10-16 RX ORDER — CEPHALEXIN 500 MG/1
500 CAPSULE ORAL EVERY 12 HOURS
Qty: 1414 CAPSULE | Refills: 0 | Status: SHIPPED | OUTPATIENT
Start: 2019-10-16 | End: 2019-10-23

## 2019-10-16 NOTE — TELEPHONE ENCOUNTER
I d/w pt's mom her recent UCx results. PT still symptomatic per mom. UTI + GBS.  Will send in Cephalexin 500 mg po BID x 7 days

## 2019-10-22 ENCOUNTER — TELEPHONE (OUTPATIENT)
Dept: PRIMARY CARE CLINIC | Facility: CLINIC | Age: 39
End: 2019-10-22

## 2019-10-22 DIAGNOSIS — F41.9 ANXIETY DISORDER, UNSPECIFIED: ICD-10-CM

## 2019-10-22 DIAGNOSIS — R79.9 ABNORMAL FINDING OF BLOOD CHEMISTRY, UNSPECIFIED: ICD-10-CM

## 2019-10-22 DIAGNOSIS — Z00.00 ANNUAL PHYSICAL EXAM: ICD-10-CM

## 2019-10-22 DIAGNOSIS — M40.209 KYPHOSIS, UNSPECIFIED KYPHOSIS TYPE, UNSPECIFIED SPINAL REGION: Primary | ICD-10-CM

## 2019-10-22 DIAGNOSIS — R56.9 SEIZURE: ICD-10-CM

## 2019-10-22 NOTE — TELEPHONE ENCOUNTER
Please see message and advise    Pt needs lab orders signed to add to appt    ----- Message from Mya Cleary sent at 10/22/2019  1:36 PM CDT -----  Lab Orders Needed    I have scheduled the above patient's annual physical and lab appointments. Lab orders need to be placed and linked.    Date of Annual Physical: 02/19/2020    Date of Lab appt: 02/14/2020    Thank You

## 2019-10-28 ENCOUNTER — PATIENT OUTREACH (OUTPATIENT)
Dept: ADMINISTRATIVE | Facility: HOSPITAL | Age: 39
End: 2019-10-28

## 2019-11-10 NOTE — PROGRESS NOTES
[facility] Note    Chief Complaint    No chief complaint on file.      History of Present Illness      Malorie Taylor is a 39 y.o. female with Sz D/O, Intellectual Disability, Obesity presents to fu Acute URI Sx's x 2 days.  She is sched for well visit with Dr. Hernandez 2/19/20     GBS UTI - tx with Cephalexin x 7 days s/p visit with me on 10/14/19.   No F/C.  No N/V.  No dysuria.  No dark urine/smelly urine. No inc freq.    Acute URI - Cough x 2 days.  +Sneezing. She has been taking Dayquil, Nyquil, and gargling with salt water.  No Sore throat.  + Congestion.      Acc by her mom     HCM - Flu - 11/4/19; Td - ?;  PAP - 1/24/18 - neg; PCP - Dr. Hernandez, Neuro - Dr. Shankar    Past Medical History:  Past Medical History:   Diagnosis Date    Allergy     Obesity 10/31/2015    Seizures     epilepsy       Past Surgical History:   has no past surgical history on file.    Family History:  family history includes Heart disease in her father; No Known Problems in her brother, brother, mother, and sister.     Social History:  Social History     Tobacco Use    Smoking status: Never Smoker    Smokeless tobacco: Never Used   Substance Use Topics    Alcohol use: Never     Frequency: Never    Drug use: Never       Review of Systems   Constitutional: Negative for chills and fever.   HENT: Positive for congestion. Negative for ear pain and sore throat.         +post nasal drip, sneezing   Respiratory: Positive for cough.    Gastrointestinal: Negative for abdominal pain, nausea and vomiting.   Genitourinary: Negative for dysuria and frequency.   Musculoskeletal: Negative for joint pain and myalgias.        Medications:  Outpatient Encounter Medications as of 11/11/2019   Medication Sig Dispense Refill    carBAMazepine (TEGRETOL XR) 400 MG Tb12 Take 1 tablet (400 mg total) by mouth 2 (two) times daily. 1 Tablet Sustained Release 12 hr Oral Twice a day 180 tablet 4    docusate sodium (COLACE)  100 MG capsule Take 1 capsule (100 mg total) by mouth 2 (two) times daily as needed for Constipation. 30 capsule 0    ferrous gluconate (FERGON) 325 MG Tab Take 1 tablet (325 mg total) by mouth daily with breakfast.  0    fish oil-omega-3 fatty acids 300-1,000 mg capsule Take 1 g by mouth 2 (two) times daily.       folic acid (FOLVITE) 1 MG tablet Take 1 tablet (1 mg total) by mouth once daily. 100 tablet 3    gabapentin (NEURONTIN) 600 MG tablet Take 2 tablets (1,200 mg total) by mouth 3 (three) times daily. 2 Tablet Oral Three times a day 540 tablet 4    hydrocortisone 2.5 % cream Apply topically 2 (two) times daily. As needed for hemorrhoid pain and swelling. (Patient not taking: Reported on 10/14/2019) 3.5 g 2    multivitamin capsule Take 1 capsule by mouth once daily.      topiramate (TOPAMAX) 200 MG Tab Take 1 tablet (200 mg total) by mouth 2 (two) times daily. 180 tablet 4    vitamin D 1000 units Tab Take 185 mg by mouth once daily.       No facility-administered encounter medications on file as of 11/11/2019.        Current Outpatient Medications:     carBAMazepine (TEGRETOL XR) 400 MG Tb12, Take 1 tablet (400 mg total) by mouth 2 (two) times daily. 1 Tablet Sustained Release 12 hr Oral Twice a day, Disp: 180 tablet, Rfl: 4    docusate sodium (COLACE) 100 MG capsule, Take 1 capsule (100 mg total) by mouth 2 (two) times daily as needed for Constipation., Disp: 30 capsule, Rfl: 0    ferrous gluconate (FERGON) 325 MG Tab, Take 1 tablet (325 mg total) by mouth daily with breakfast., Disp: , Rfl: 0    fish oil-omega-3 fatty acids 300-1,000 mg capsule, Take 1 g by mouth 2 (two) times daily. , Disp: , Rfl:     folic acid (FOLVITE) 1 MG tablet, Take 1 tablet (1 mg total) by mouth once daily., Disp: 100 tablet, Rfl: 3    gabapentin (NEURONTIN) 600 MG tablet, Take 2 tablets (1,200 mg total) by mouth 3 (three) times daily. 2 Tablet Oral Three times a day, Disp: 540 tablet, Rfl: 4    hydrocortisone 2.5 %  "cream, Apply topically 2 (two) times daily. As needed for hemorrhoid pain and swelling. (Patient not taking: Reported on 10/14/2019), Disp: 3.5 g, Rfl: 2    multivitamin capsule, Take 1 capsule by mouth once daily., Disp: , Rfl:     topiramate (TOPAMAX) 200 MG Tab, Take 1 tablet (200 mg total) by mouth 2 (two) times daily., Disp: 180 tablet, Rfl: 4    vitamin D 1000 units Tab, Take 185 mg by mouth once daily., Disp: , Rfl:     Allergies:  Review of patient's allergies indicates:   Allergen Reactions    Phenobarbital      lethargy       Health Maintenance:  Immunization History   Administered Date(s) Administered    Tdap 02/01/2018      Health Maintenance   Topic Date Due    Pap Smear with HPV Cotest  01/24/2021    TETANUS VACCINE  02/01/2028    Lipid Panel  Completed      Objective:      Vital Signs  Temp: 97.8 °F (36.6 °C)  Temp src: Oral  Pulse: 64  Resp: 16  SpO2: 99 %  BP: 100/75  BP Location: Right arm  Patient Position: Sitting  Pain Score: 0-No pain  Height and Weight  Height: 5' 5" (165.1 cm)  Weight: 111.9 kg (246 lb 11.1 oz)  BSA (Calculated - sq m): 2.27 sq meters  BMI (Calculated): 41.1  Weight in (lb) to have BMI = 25: 149.9]    Laboratory:  CBC:  Recent Labs   Lab 01/19/18  0800 02/04/19  0805 05/06/19  1428   WBC 6.53 5.08 6.73   RBC 3.53 L 3.45 L 3.64 L   Hemoglobin 12.0 12.0 12.7   Hematocrit 36.0 L 36.7 L 38.7   Platelets 258 274 266   Mean Corpuscular Volume 102 H 106 H 106 H   Mean Corpuscular Hemoglobin 34.0 H 34.8 H 34.9 H   Mean Corpuscular Hemoglobin Conc 33.3 32.7 32.8       CMP:  Recent Labs   Lab 11/29/16  0800  01/19/18  0800 02/04/19  0805   Glucose 77  --  91 82   Calcium 8.7  --  9.3 9.3   Albumin 3.5  --  3.5 3.5   Total Protein 7.1  --  7.7 7.5   Sodium 132 L   < > 136 136   Potassium 3.7  --  3.9 4.0   CO2 22 L  --  23 22 L   Chloride 103  --  106 107   BUN, Bld 13  --  13 10   Creatinine 0.7  --  0.8 0.7   eGFR if African American >60.0  --  >60.0 >60.0   eGFR if non "  >60.0  --  >60.0 >60.0   Alkaline Phosphatase 95  --  108 102   ALT 15  --  19 15   AST 20  --  20 20   Total Bilirubin 0.3  --  0.3 0.3    < > = values in this interval not displayed.       URINALYSIS:  Recent Labs   Lab 10/14/19  1550   Color, UA Sho   Specific Gravity, UA 1.015   pH, UA 7.0   Protein, UA Negative   Bilirubin (UA) Negative   Bacteria Moderate A      Recent Labs   Lab 10/14/19  1550   Nitrite, UA Positive A   Leukocytes, UA 2+ A        LIPIDS:  Recent Labs   Lab 11/29/16  0800 01/19/18  0800 02/04/19  0805   TSH 1.967 3.090 1.920   HDL 64 60 64   Cholesterol 215 H 237 H 209 H   Triglycerides 55 84 98   LDL Cholesterol 140.0 160.2 H 125.4   Hdl/Cholesterol Ratio 29.8 25.3 30.6   Non-HDL Cholesterol 151 177 145   Total Cholesterol/HDL Ratio 3.4 4.0 3.3       TSH:  Recent Labs   Lab 11/29/16  0800 01/19/18  0800 02/04/19  0805   TSH 1.967 3.090 1.920       A1C:        Urine Microalbumin/Cr:  No results found for: MICALBCREAT    Other:   Lab Results   Component Value Date    [id number] 513 08/11/2012     No results found for: PSA, PSADIAG, HEPCAB    Physical Exam   Constitutional: She is oriented to person, place, and time. She appears well-developed and well-nourished. She appears distressed.   HENT:   Head: Normocephalic and atraumatic.   Mouth/Throat: Oropharyngeal exudate present.   Chris Cerumen impaction partially removed manually;  Still unable to visualize chris TMbil nasal turbs inflammed.  No sinus TTP   Eyes: Pupils are equal, round, and reactive to light. EOM are normal.   Cardiovascular: Normal rate, regular rhythm, normal heart sounds and intact distal pulses.   Pulmonary/Chest: Effort normal and breath sounds normal. No respiratory distress.   Abdominal: Soft. Bowel sounds are normal. She exhibits no distension.   Neurological: She is alert and oriented to person, place, and time.   Skin: Skin is warm and dry. She is not diaphoretic.   Psychiatric: She has a normal mood  and affect.   Nursing note and vitals reviewed.          Assessment:       No diagnosis found.    Malorie Taylor is a 39 y.o.female with:    1. Acute rhinitis  -Suspect viral in etiol.  Rec supportive care.  Rec strict hand hygiene.  Rec APAP/Ibuprofen prn T > 100.3/pain. RTC or alert MD if Symptoms persist/worsen.  Rec Claritin 10 mg po daily.      Chronic conditions status updated as per HPI.  Other than changes above, cont current medications and maintain follow up with specialists.  Return to clinic in Feb as sched for well visit with Dr. Sapp or sooner if needed.    Ce Arciniega MD  [facility]

## 2019-11-11 ENCOUNTER — OFFICE VISIT (OUTPATIENT)
Dept: PRIMARY CARE CLINIC | Facility: CLINIC | Age: 39
End: 2019-11-11
Payer: MEDICARE

## 2019-11-11 VITALS
HEIGHT: 65 IN | OXYGEN SATURATION: 99 % | HEART RATE: 64 BPM | RESPIRATION RATE: 16 BRPM | SYSTOLIC BLOOD PRESSURE: 100 MMHG | BODY MASS INDEX: 41.1 KG/M2 | WEIGHT: 246.69 LBS | DIASTOLIC BLOOD PRESSURE: 75 MMHG | TEMPERATURE: 98 F

## 2019-11-11 DIAGNOSIS — J00 ACUTE RHINITIS: Primary | ICD-10-CM

## 2019-11-11 PROCEDURE — 99214 OFFICE O/P EST MOD 30 MIN: CPT | Mod: PBBFAC,PN | Performed by: INTERNAL MEDICINE

## 2019-11-11 PROCEDURE — 99999 PR PBB SHADOW E&M-EST. PATIENT-LVL IV: ICD-10-PCS | Mod: PBBFAC,,, | Performed by: INTERNAL MEDICINE

## 2019-11-11 PROCEDURE — 99213 PR OFFICE/OUTPT VISIT, EST, LEVL III, 20-29 MIN: ICD-10-PCS | Mod: S$PBB,,, | Performed by: INTERNAL MEDICINE

## 2019-11-11 PROCEDURE — 99213 OFFICE O/P EST LOW 20 MIN: CPT | Mod: S$PBB,,, | Performed by: INTERNAL MEDICINE

## 2019-11-11 PROCEDURE — 99999 PR PBB SHADOW E&M-EST. PATIENT-LVL IV: CPT | Mod: PBBFAC,,, | Performed by: INTERNAL MEDICINE

## 2019-11-11 NOTE — PATIENT INSTRUCTIONS

## 2019-11-14 ENCOUNTER — TELEPHONE (OUTPATIENT)
Dept: NEUROLOGY | Facility: CLINIC | Age: 39
End: 2019-11-14

## 2019-11-14 NOTE — TELEPHONE ENCOUNTER
----- Message from Hari Chappell sent at 11/14/2019  9:20 AM CST -----  Contact: Nelly (mom) @   Rx Refill/Request     Is this a Refill or New Rx: Refill  Rx Name and Strength: gabapentin (NEURONTIN) 600 MG tablet   Preferred Pharmacy with phone number: see below  Communication Preference: Nelly (mom) @ [phone]  Additional Information: Nelly states the pt will need script filled for pt's nightly dosage [facility]    [id number] - [place] [address] AT [facility]  [address] [facility] [address]  Phone: [phone] Fax: [phone]

## 2019-11-15 ENCOUNTER — TELEPHONE (OUTPATIENT)
Dept: NEUROLOGY | Facility: CLINIC | Age: 39
End: 2019-11-15

## 2019-11-15 NOTE — TELEPHONE ENCOUNTER
----- Message from Lenora Diane sent at 11/15/2019 10:50 AM CST -----  Contact: Mom at [phone]  Rx Refill/Request     Is this a Refill or New Rx:  refill  Rx Name and Strength: Topiramate 200mg  Qty 180  Preferred Pharmacy with phone number: [facility] at  [phone]  Communication Preference:  Additional Information: Pt has an appt November 25 but will run out of medication Saturday evening.  Forgot to ask for it yesterday when you filled the other med.  Call when taken care of.  Pharmacy also faxed over the request.

## 2019-11-20 ENCOUNTER — PATIENT OUTREACH (OUTPATIENT)
Dept: ADMINISTRATIVE | Facility: OTHER | Age: 39
End: 2019-11-20

## 2019-11-24 NOTE — PROGRESS NOTES
[facility]:   FOLLOW UP VISIT    Name: Malorie Taylor  MRN:[id number]   CSN: [id number]    Date of service: 11/23/2019  Last clinic visit: 11/1/18  Prior clinic visit: 10/10/17  Prior clinic visit: 4/12/17 ()    Age:39 y.o.   Gender:female     The patient is here today with her father   History obtained from the patient, her father and prior notes    CHIEF COMPLAINT:   - follow-up of seizures    INTERVAL HISTORY (Since last visit):    This is a 39 y.o.  right handed female who presents for follow-up of seizures    Doing well; planning to travel during holidays to see family   No seizures since last visit    Current AEDs: compliant   1) Topiramate 200 mg bid  2) Carbamazepine 400 mg bid   3) Gabapentin 1200 mg tid    Notes from last clinic visit, 11/1/18:  No seizures since last visit; last sz was > 12 -13 years ago; doing well.  Attends local center for work    Current AEDs: compliant  1) Topiramate 200 mg bid  2) Carbamazepine 400 mg bid   3) Gabapentin 1200 mg tid    No levels since 2008 - per father levels are chkd by PCP    Notes from last clinic visit, 10/10/17:  Controlled on her current AED regimen with no seizures > 12 years  - per father addition of Topiramate controlled the seizures    Brief seizure history:  Parents noted 1st seizure at age 7 years - but noted by 3 siblings (seperated by ~ 1 year each; 2 older and 1 younger) from earlier on (but unclear re exact onset)   Seizure types:  1) Blank stare: eyes open, lasting few seconds and unresponsive  2) Confusional episodes: more common  - GTC seizure occurred only when attempted to take off/taper AEDs   No hx of SE or admission into hospital for seizures.    SEIZURE HISTORY: Clinic note, 4/12/2017 ():  Malorie Taylor returns to [facility]rology Clinic for medical management of her seizure disorder.    In spite of missing a dose of medication, there have been no interval seizures.  She is accompanied to the office by her father who helps provide  some of the history.  She does not report any current health issues.  IMPRESSION AND PLAN:  In conclusion, she continues to do well from the standpoint of her seizure disorder.  Her medication will be continued.    I am prescribing Topamax 200 mg twice a day, carbamazepine 400 mg twice a day, gabapentin 1200 mg three times a day.    She will need to follow up with her primary care doctor, Dr. Sapp for her anemia.    Her next appointment with me will be in six months.  She may call if there is a question in the interim    RELEVANT LABS AND TESTS SINCE LAST VISIT:   Additional tests:  1)CT Scan: normal, per father   2) EEG\Video Monitoring:no   3) PET Scan: no  4) Neuropsychological evaluation: no  5) DEXA Scan: no  6) Others: no    RISK FACTORS FOR SEIZURES:    1. Head Trauma:  No    2. CNS Infections:  No  3. CNS Tumors: No     4. CNS Vascular Disease: No     5. Febrile Seizures: No    6. Developmental Delay: Yes - required Sp Ed       7. Family History of Seizures: Yes : paternal GM - one of a twin, who had seizures   8. Birth history: FT CS (prior CS)    Pregnancy/Labor/Delivery: n/a    CURRENT MEDICATIONS:   Current Outpatient Medications   Medication Sig Dispense Refill    carBAMazepine (TEGRETOL XR) 400 MG Tb12 Take 1 tablet (400 mg total) by mouth 2 (two) times daily. 1 Tablet Sustained Release 12 hr Oral Twice a day (Patient not taking: Reported on 11/11/2019) 180 tablet 4    docusate sodium (COLACE) 100 MG capsule Take 1 capsule (100 mg total) by mouth 2 (two) times daily as needed for Constipation. 30 capsule 0    ferrous gluconate (FERGON) 325 MG Tab Take 1 tablet (325 mg total) by mouth daily with breakfast.  0    fish oil-omega-3 fatty acids 300-1,000 mg capsule Take 1 g by mouth 2 (two) times daily.       folic acid (FOLVITE) 1 MG tablet Take 1 tablet (1 mg total) by mouth once daily. 100 tablet 3    gabapentin (NEURONTIN) 600 MG tablet Take 2 tablets (1,200 mg total) by mouth 3 (three) times  daily. 2 Tablet Oral Three times a day 540 tablet 4    hydrocortisone 2.5 % cream Apply topically 2 (two) times daily. As needed for hemorrhoid pain and swelling. (Patient not taking: Reported on 10/14/2019) 3.5 g 2    multivitamin capsule Take 1 capsule by mouth once daily.      topiramate (TOPAMAX) 200 MG Tab Take 1 tablet (200 mg total) by mouth 2 (two) times daily. 180 tablet 4    vitamin D 1000 units Tab Take 185 mg by mouth once daily.       No current facility-administered medications for this visit.       Not on any contraception    CURRENT ANTI EPILEPTIC MEDICATIONS:    1) Topiramate 200 mg bid  2) Carbamazepine 400 mg bid   3) Gabapentin 1200 mg tid    Results for LIZA DAVIS (MRN [id number]) as of 10/30/2018 19:32   Ref. Range 1/24/2006 08:03 3/6/2008 14:20 9/22/2008 13:28 10/8/2008 07:12   Carbamazepine Lvl Latest Ref Range: 6.0 - 12.0 ug/ml 6.5 (L)  10.7    Gabapentin Lvl Latest Ref Range: 2 - 12.0 ug/mL  13.7 (H) 16.0 (H)    Lamotrigine Lvl Latest Units: ug/mL  <0.2     Topiramate Lvl Latest Ref Range: 2 - 12.0 ug/mL  7.7 Test Not Performed 12.9 (H)       VAGAL NERVE STIMULATOR: n/a    PRIOR ANTICONVULSANT HISTORY: tried several prior - not sure which ones; father remembers PB (had an allergic reaction)      PAST MEDICAL HISTORY:   Active Ambulatory Problems     Diagnosis Date Noted    Seizure disorder 08/10/2012    Mental retardation 08/26/2013    Kyphosis 08/26/2013    Severe obesity 10/31/2015     Resolved Ambulatory Problems     Diagnosis Date Noted    No Resolved Ambulatory Problems     Past Medical History:   Diagnosis Date    Allergy     Obesity 10/31/2015    Seizures         PAST PSYCHIATRIC HISTORY:  Mood swings during menstrual periods    PAST SURGICAL HISTORY including Epilepsy surgery: No past surgical history on file.     FAMILY HISTORY:   Family History   Problem Relation Age of Onset    No Known Problems Mother     Heart disease Father     No Known Problems Sister      No Known Problems Brother     No Known Problems Brother     Acne Neg Hx     Eczema Neg Hx     Lupus Neg Hx     Psoriasis Neg Hx     Melanoma Neg Hx          SOCIAL HISTORY:   Social History     Socioeconomic History    Marital status: Single     Spouse name: Not on file    Number of children: Not on file    Years of education: Not on file    Highest education level: Not on file   Occupational History    Occupation: Housekeeping   Social Needs    Financial resource strain: Not on file    Food insecurity:     Worry: Not on file     Inability: Not on file    Transportation needs:     Medical: Not on file     Non-medical: Not on file   Tobacco Use    Smoking status: Never Smoker    Smokeless tobacco: Never Used   Substance and Sexual Activity    Alcohol use: Never     Frequency: Never    Drug use: Never    Sexual activity: Never   Lifestyle    Physical activity:     Days per week: Not on file     Minutes per session: Not on file    Stress: Not on file   Relationships    Social connections:     Talks on phone: Not on file     Gets together: Not on file     Attends [employer] service: Not on file     Active member of club or organization: Not on file     Attends meetings of clubs or organizations: Not on file     Relationship status: Not on file   Other Topics Concern    Are you pregnant or think you may be? Not Asked    Breast-feeding Not Asked   Social History Narrative    Not on file      a) Marital status: Single                                                    b) Living situation: patient lives with parents  c) Employed/Unemployed/Other: Employed part time - works ~ 2 hours per day (housekeeping) at the [employer]    DRIVING HISTORY:  Currently driving: No      LEVEL OF EDUCATION: Sp Ed    SUBSTANCE USE: no hx of tobacco, etoh or other substance use  ALLERGIES: Phenobarbital     REVIEW OF SYSTEMS:  Review of Systems     GENERAL EXAMINATION:  There were no vitals taken for this  visit.     GENERAL EXAMINATION:  General Appearance:    This is an average built female who appears well.  HEENT: There are mild dysmorphic features  Skin: There are no obvious stigmata for neurocutaneous disorders.  Neck: supple   Cardiovascular: regular rate and rhythm  Lungs: clear  Abdomen: deferred  The spine is non-tender.  Kyphosis:  NoScoliosis: No   Extremities: Warm and well perfused    NEUROLOGICAL EXAMINATION:  Mental status: Alert, appropriate and pleasant   Speech: normal  Dysarthria: No   Eyes: PERRL; EOM intact; No nystagmus.The visual pursuits  were smooth with normal saccadic eye movements.   Fundoscopic Exam: not tested  No facial asymmetry. Intact facial sensation bilaterally.  Hearing was intact bilaterally to finger rub  Tongue and palate was in the midline  Intact SCM and trapezii bilaterally     Motor examination:  Normal bulk and tone bilaterally. Power: no pronater drift; 5/5 bilaterally symmetric UE/LE  Abnormal movements: none  Deep tendon reflexes: 2+ bilaterally symmetric UE/LE with flexor plantars  Dysmetria: No     Sensory examination:   Not assessed at this time    Gait:  Normal gait and station    IMPRESSION:  The patient's history is consistent with:   Seizure disorder  38yo F with hx of MR and seizures since age ~7years  - controlled on current AED regimen with no seizures > 12 years    Plan:  - continue same regimen:  1) Topiramate 200 mg bid  2) Carbamazepine 400 mg bid   3) Gabapentin 1200 mg tid  - check AED levels    Plan of care was discussed with patient and her father.    The patient was asked to call me/the clinic 1 week after the test(s) are done to obtain results.  More than 50% of the 30 minutes spent with the patient (as well as family/caregiver(s) was spent on face-to-face counseling about:  1. Diagnosis, plans, prognosis, medications and possible side-effects, risks and benefits of treatment, other alternatives to AEDs.  2. Risks related to continued seizures,  status epilepticus, SUDEP, driving restrictions and seizure precautions ( no baths but showers are ok, no swimming unsupervised, no use of heavy machinery, no use of sharp moving objects, avoid heights).   3. Issues related to pregnancy, OCP and breast feeding as it relates to epilepsy (in female patients).  4. The potential of teratogenicity (for female patients) and suicidal risks of anti-epileptic medications.  5.Avoid any activity that compromise patient safety related to seizures.    Questions and concerns raised by the patient and family/care-giver(s) were addressed and they indicated understanding of everything discussed and agreed to plans as above.    Return in 1 year or earlier prn    Karly Shankar MD, ESTEFANY(), FACNS.  Neurology-Epilepsy.

## 2019-11-25 ENCOUNTER — OFFICE VISIT (OUTPATIENT)
Dept: NEUROLOGY | Facility: CLINIC | Age: 39
End: 2019-11-25
Payer: MEDICARE

## 2019-11-25 VITALS — BODY MASS INDEX: 39.99 KG/M2 | WEIGHT: 240.31 LBS

## 2019-11-25 DIAGNOSIS — G40.909 SEIZURE DISORDER: Primary | ICD-10-CM

## 2019-11-25 PROCEDURE — 99999 PR PBB SHADOW E&M-EST. PATIENT-LVL I: ICD-10-PCS | Mod: PBBFAC,,, | Performed by: PSYCHIATRY & NEUROLOGY

## 2019-11-25 PROCEDURE — 99215 PR OFFICE/OUTPT VISIT, EST, LEVL V, 40-54 MIN: ICD-10-PCS | Mod: S$PBB,,, | Performed by: PSYCHIATRY & NEUROLOGY

## 2019-11-25 PROCEDURE — 99215 OFFICE O/P EST HI 40 MIN: CPT | Mod: S$PBB,,, | Performed by: PSYCHIATRY & NEUROLOGY

## 2019-11-25 PROCEDURE — 99999 PR PBB SHADOW E&M-EST. PATIENT-LVL I: CPT | Mod: PBBFAC,,, | Performed by: PSYCHIATRY & NEUROLOGY

## 2019-11-25 PROCEDURE — 99211 OFF/OP EST MAY X REQ PHY/QHP: CPT | Mod: PBBFAC | Performed by: PSYCHIATRY & NEUROLOGY

## 2019-11-25 RX ORDER — TOPIRAMATE 200 MG/1
200 TABLET ORAL 2 TIMES DAILY
Qty: 180 TABLET | Refills: 4 | Status: SHIPPED | OUTPATIENT
Start: 2019-11-25 | End: 2021-01-26

## 2019-11-25 RX ORDER — CARBAMAZEPINE 400 MG/1
400 TABLET, EXTENDED RELEASE ORAL 2 TIMES DAILY
Qty: 180 TABLET | Refills: 4 | Status: SHIPPED | OUTPATIENT
Start: 2019-11-25 | End: 2020-11-30

## 2019-11-25 RX ORDER — GABAPENTIN 600 MG/1
1200 TABLET ORAL 3 TIMES DAILY
Qty: 540 TABLET | Refills: 4 | Status: SHIPPED | OUTPATIENT
Start: 2019-11-25 | End: 2020-11-30

## 2019-12-02 NOTE — ASSESSMENT & PLAN NOTE
38yo F with hx of MR and seizures since age ~7years  - controlled on current AED regimen with no seizures > 12 years    Plan:  - continue same regimen:  1) Topiramate 200 mg bid  2) Carbamazepine 400 mg bid   3) Gabapentin 1200 mg tid  - check AED levels    Plan of care was discussed with patient and her father.

## 2020-02-14 ENCOUNTER — LAB VISIT (OUTPATIENT)
Dept: LAB | Facility: HOSPITAL | Age: 40
End: 2020-02-14
Attending: FAMILY MEDICINE
Payer: MEDICARE

## 2020-02-14 DIAGNOSIS — R79.9 ABNORMAL FINDING OF BLOOD CHEMISTRY, UNSPECIFIED: ICD-10-CM

## 2020-02-14 DIAGNOSIS — F41.9 ANXIETY DISORDER, UNSPECIFIED: ICD-10-CM

## 2020-02-14 DIAGNOSIS — Z00.00 ANNUAL PHYSICAL EXAM: ICD-10-CM

## 2020-02-14 LAB
ALBUMIN SERPL BCP-MCNC: 3.7 G/DL (ref 3.5–5.2)
ALP SERPL-CCNC: 99 U/L (ref 55–135)
ALT SERPL W/O P-5'-P-CCNC: 14 U/L (ref 10–44)
ANION GAP SERPL CALC-SCNC: 7 MMOL/L (ref 8–16)
AST SERPL-CCNC: 19 U/L (ref 10–40)
BASOPHILS # BLD AUTO: 0.02 K/UL (ref 0–0.2)
BASOPHILS NFR BLD: 0.4 % (ref 0–1.9)
BILIRUB SERPL-MCNC: 0.3 MG/DL (ref 0.1–1)
BUN SERPL-MCNC: 8 MG/DL (ref 6–20)
CALCIUM SERPL-MCNC: 8.8 MG/DL (ref 8.7–10.5)
CHLORIDE SERPL-SCNC: 105 MMOL/L (ref 95–110)
CHOLEST SERPL-MCNC: 209 MG/DL (ref 120–199)
CHOLEST/HDLC SERPL: 3.5 {RATIO} (ref 2–5)
CO2 SERPL-SCNC: 23 MMOL/L (ref 23–29)
CREAT SERPL-MCNC: 0.8 MG/DL (ref 0.5–1.4)
DIFFERENTIAL METHOD: ABNORMAL
EOSINOPHIL # BLD AUTO: 0.1 K/UL (ref 0–0.5)
EOSINOPHIL NFR BLD: 2.8 % (ref 0–8)
ERYTHROCYTE [DISTWIDTH] IN BLOOD BY AUTOMATED COUNT: 11.7 % (ref 11.5–14.5)
EST. GFR  (AFRICAN AMERICAN): >60 ML/MIN/1.73 M^2
EST. GFR  (NON AFRICAN AMERICAN): >60 ML/MIN/1.73 M^2
GLUCOSE SERPL-MCNC: 88 MG/DL (ref 70–110)
HCT VFR BLD AUTO: 37.5 % (ref 37–48.5)
HDLC SERPL-MCNC: 60 MG/DL (ref 40–75)
HDLC SERPL: 28.7 % (ref 20–50)
HGB BLD-MCNC: 12.2 G/DL (ref 12–16)
IMM GRANULOCYTES # BLD AUTO: 0.02 K/UL (ref 0–0.04)
IMM GRANULOCYTES NFR BLD AUTO: 0.4 % (ref 0–0.5)
LDLC SERPL CALC-MCNC: 134.8 MG/DL (ref 63–159)
LYMPHOCYTES # BLD AUTO: 1.5 K/UL (ref 1–4.8)
LYMPHOCYTES NFR BLD: 32.1 % (ref 18–48)
MCH RBC QN AUTO: 34.7 PG (ref 27–31)
MCHC RBC AUTO-ENTMCNC: 32.5 G/DL (ref 32–36)
MCV RBC AUTO: 107 FL (ref 82–98)
MONOCYTES # BLD AUTO: 0.4 K/UL (ref 0.3–1)
MONOCYTES NFR BLD: 8.9 % (ref 4–15)
NEUTROPHILS # BLD AUTO: 2.6 K/UL (ref 1.8–7.7)
NEUTROPHILS NFR BLD: 55.4 % (ref 38–73)
NONHDLC SERPL-MCNC: 149 MG/DL
NRBC BLD-RTO: 0 /100 WBC
PLATELET # BLD AUTO: 258 K/UL (ref 150–350)
PMV BLD AUTO: 9.7 FL (ref 9.2–12.9)
POTASSIUM SERPL-SCNC: 3.7 MMOL/L (ref 3.5–5.1)
PROT SERPL-MCNC: 7.5 G/DL (ref 6–8.4)
RBC # BLD AUTO: 3.52 M/UL (ref 4–5.4)
SODIUM SERPL-SCNC: 135 MMOL/L (ref 136–145)
TRIGL SERPL-MCNC: 71 MG/DL (ref 30–150)
TSH SERPL DL<=0.005 MIU/L-ACNC: 1.64 UIU/ML (ref 0.4–4)
WBC # BLD AUTO: 4.61 K/UL (ref 3.9–12.7)

## 2020-02-14 PROCEDURE — 84443 ASSAY THYROID STIM HORMONE: CPT

## 2020-02-14 PROCEDURE — 80061 LIPID PANEL: CPT

## 2020-02-14 PROCEDURE — 36415 COLL VENOUS BLD VENIPUNCTURE: CPT | Mod: PN

## 2020-02-14 PROCEDURE — 85025 COMPLETE CBC W/AUTO DIFF WBC: CPT

## 2020-02-14 PROCEDURE — 80053 COMPREHEN METABOLIC PANEL: CPT

## 2020-02-19 ENCOUNTER — OFFICE VISIT (OUTPATIENT)
Dept: PRIMARY CARE CLINIC | Facility: CLINIC | Age: 40
End: 2020-02-19
Payer: MEDICARE

## 2020-02-19 VITALS
DIASTOLIC BLOOD PRESSURE: 72 MMHG | BODY MASS INDEX: 40.91 KG/M2 | SYSTOLIC BLOOD PRESSURE: 100 MMHG | TEMPERATURE: 99 F | WEIGHT: 245.56 LBS | HEART RATE: 72 BPM | HEIGHT: 65 IN

## 2020-02-19 DIAGNOSIS — Z01.00 ROUTINE EYE EXAM: ICD-10-CM

## 2020-02-19 DIAGNOSIS — R30.0 DYSURIA: ICD-10-CM

## 2020-02-19 DIAGNOSIS — Z12.31 OTHER SCREENING MAMMOGRAM: Primary | ICD-10-CM

## 2020-02-19 LAB
BILIRUB SERPL-MCNC: NORMAL MG/DL
BLOOD URINE, POC: NORMAL
COLOR, POC UA: NORMAL
GLUCOSE UR QL STRIP: NORMAL
KETONES UR QL STRIP: NORMAL
LEUKOCYTE ESTERASE URINE, POC: NORMAL
NITRITE, POC UA: NORMAL
PH, POC UA: 5
PROTEIN, POC: NORMAL
SPECIFIC GRAVITY, POC UA: 1
UROBILINOGEN, POC UA: NORMAL

## 2020-02-19 PROCEDURE — 99213 PR OFFICE/OUTPT VISIT, EST, LEVL III, 20-29 MIN: ICD-10-PCS | Mod: S$PBB,,, | Performed by: FAMILY MEDICINE

## 2020-02-19 PROCEDURE — 99999 PR PBB SHADOW E&M-EST. PATIENT-LVL III: CPT | Mod: PBBFAC,,, | Performed by: FAMILY MEDICINE

## 2020-02-19 PROCEDURE — 81002 URINALYSIS NONAUTO W/O SCOPE: CPT | Mod: PBBFAC,PN | Performed by: FAMILY MEDICINE

## 2020-02-19 PROCEDURE — 99999 PR PBB SHADOW E&M-EST. PATIENT-LVL III: ICD-10-PCS | Mod: PBBFAC,,, | Performed by: FAMILY MEDICINE

## 2020-02-19 PROCEDURE — 99213 OFFICE O/P EST LOW 20 MIN: CPT | Mod: S$PBB,,, | Performed by: FAMILY MEDICINE

## 2020-02-19 PROCEDURE — 99213 OFFICE O/P EST LOW 20 MIN: CPT | Mod: PBBFAC,PN | Performed by: FAMILY MEDICINE

## 2020-02-20 NOTE — PROGRESS NOTES
Malorie Taylor is a 39 y.o. female   Routine physical  Source of history: Patient  Past Medical History:   Diagnosis Date    Allergy     Obesity 10/31/2015    Seizures     epilepsy     Patient  reports that she has never smoked. She has never used smokeless tobacco. She reports that she does not drink alcohol or use drugs.  Family History   Problem Relation Age of Onset    No Known Problems Mother     Heart disease Father     No Known Problems Sister     No Known Problems Brother     No Known Problems Brother     Acne Neg Hx     Eczema Neg Hx     Lupus Neg Hx     Psoriasis Neg Hx     Melanoma Neg Hx      ROS:  GENERAL: No fever, chills, fatigability or weight loss.  SKIN: No rashes, itching or changes in color or texture of skin.  HEAD: No headaches or recent head trauma.  EYES: Visual acuity fine. No photophobia, ocular pain or diplopia.  EARS: Denies ear pain, discharge or vertigo.  NOSE: No loss of smell, no epistaxis or postnasal drip.  MOUTH & THROAT: No hoarseness or change in voice. No excessive gum bleeding.  NODES: Denies swollen glands.  CHEST: Denies VILLELA, cyanosis, wheezing, cough and sputum production.  CARDIOVASCULAR: Denies chest pain, PND, orthopnea or reduced exercise tolerance.  ABDOMEN: Appetite fine. No weight loss. Denies diarrhea, abdominal pain, hematemesis or blood in stool.  URINARY: No flank pain, dysuria or hematuria.  PERIPHERAL VASCULAR: No claudication or cyanosis.  MUSCULOSKELETAL: No joint stiffness or swelling. Denies back pain.  NEUROLOGIC: No history of seizures, paralysis, alteration of gait or coordination.    OBJECTIVE:  APPEARANCE:  Overweight no acute distress  Vitals:    02/19/20 1310   BP: 100/72   Pulse: 72   Temp: 98.7 °F (37.1 °C)     SKIN: Normal skin turgor, no lesions.  HEENT: Both external auditory canals clear. Both tympanic membranes intact.   PERRL. EOMI. Disk margins sharp. No tonsillar enlargement. No pharyngeal erythema or exudate. No  stridor.  NECK: No bruits. No cervical spine tenderness. No cervical lymphadenopathy. No thyromegaly.  NODES: No cervical, axillary or inguinal lymph node enlargement.  CHEST: Breath sounds clear bilaterally. Lungs clear to auscultation & percussion.   Good air movement. No rales. No retractions. No rhonchi. No stridor. No wheezes.  CARDIOVASCULAR: Normal S1, S2. No murmurs. No edema.  BREASTS: no masses palpated in either breast or axillary area, symmetry noted.  ABDOMEN: Bowel sounds normal. No palpable aortic enlargement. No CVA tenderness.   No pulsatile mass. No rebound tenderness.  PERIPHERAL VASCULAR: Femoral pulses present and symmetrical. No edema.  MUSCULOSKELETAL: Degenerative changes of both ankles, foot, knee, wrist and hand.  BACK: No CVA tenderness. There is no spasm, tenderness or radiculopathy noted with palpation and there is full range of motion.   NEUROLOGIC:   Cranial Nerves: II-XII grossly intact.  Motor: 5/5 strength major flexors/extensors. No tremor.  DTR's: Knees, Ankles 2+ and equal bilaterally; downgoing toes.  Sensory: Intact to light touch distally.  Gait & Posture: Normal gait and fine motion. No cerebellar signs.  MENTAL STATUS: Alert. Oriented x 3. Language skills normal. Memory intact.   No suicidal ideation. Normal affect. Normal cognitive functions.  Well kept appearance.    ASSESSMENT/PLAN:   Malorie was seen today for annual exam.    Diagnoses and all orders for this visit:    Other screening mammogram  -     Mammo Digital Screening Bilateral With CAD; Future    Dysuria  -     POCT URINE DIPSTICK WITHOUT MICROSCOPE  Patient's dip UA was normal  Routine eye exam  -     Ambulatory referral/consult to Optometry; Future     Patient's labs were discussed no areas of concern noted  Will contact the patient with results of mammogram when available patient due for Pap smear next year

## 2020-05-22 ENCOUNTER — HOSPITAL ENCOUNTER (OUTPATIENT)
Dept: RADIOLOGY | Facility: HOSPITAL | Age: 40
Discharge: HOME | End: 2020-05-22
Attending: FAMILY MEDICINE
Payer: MEDICARE

## 2020-05-22 DIAGNOSIS — Z12.31 OTHER SCREENING MAMMOGRAM: ICD-10-CM

## 2020-05-22 PROCEDURE — 77067 MAMMO DIGITAL SCREENING BILAT WITH TOMOSYNTHESIS_CAD: ICD-10-PCS | Mod: 26,,, | Performed by: RADIOLOGY

## 2020-05-22 PROCEDURE — 77067 SCR MAMMO BI INCL CAD: CPT | Mod: 26,,, | Performed by: RADIOLOGY

## 2020-05-22 PROCEDURE — 77063 BREAST TOMOSYNTHESIS BI: CPT | Mod: 26,,, | Performed by: RADIOLOGY

## 2020-05-22 PROCEDURE — 77063 MAMMO DIGITAL SCREENING BILAT WITH TOMOSYNTHESIS_CAD: ICD-10-PCS | Mod: 26,,, | Performed by: RADIOLOGY

## 2020-05-22 PROCEDURE — 77067 SCR MAMMO BI INCL CAD: CPT | Mod: TC,PN

## 2020-06-09 ENCOUNTER — TELEPHONE (OUTPATIENT)
Dept: PRIMARY CARE CLINIC | Facility: CLINIC | Age: 40
End: 2020-06-09

## 2020-06-09 NOTE — TELEPHONE ENCOUNTER
----- Message from Nadia Pratimajabier sent at 6/9/2020  9:05 AM CDT -----  Contact: Mother [phone]  Patient is requesting Rx for symptoms below.    Symptoms:   Burning while urinating/rash on private area    If patient taken anything OTC: no    How long has patient has these symptoms: 1-2 days    Comments:    Pharmacy: [id number] [address] [place] [address] AT [facility] [phone] (Phone)  [phone] (Fax)    Please call and advise.    Thank You

## 2020-06-09 NOTE — TELEPHONE ENCOUNTER
Spoke with father, tried an OTC cream for rash    CC: pain with urinating, some blood - not on menses (last week), denies nausea, vomiting, abdominal pain, or fever    Can you send medication to pharmacy?

## 2020-06-10 ENCOUNTER — TELEPHONE (OUTPATIENT)
Dept: PRIMARY CARE CLINIC | Facility: CLINIC | Age: 40
End: 2020-06-10

## 2020-06-10 ENCOUNTER — CLINICAL SUPPORT (OUTPATIENT)
Dept: PRIMARY CARE CLINIC | Facility: CLINIC | Age: 40
End: 2020-06-10
Payer: MEDICARE

## 2020-06-10 DIAGNOSIS — N39.0 URINARY TRACT INFECTION WITHOUT HEMATURIA, SITE UNSPECIFIED: ICD-10-CM

## 2020-06-10 DIAGNOSIS — R30.0 DYSURIA: Primary | ICD-10-CM

## 2020-06-10 LAB
BILIRUB SERPL-MCNC: NEGATIVE MG/DL
BLOOD URINE, POC: ABNORMAL
CLARITY, POC UA: ABNORMAL
COLOR, POC UA: YELLOW
GLUCOSE UR QL STRIP: NORMAL
KETONES UR QL STRIP: NEGATIVE
LEUKOCYTE ESTERASE URINE, POC: ABNORMAL
NITRITE, POC UA: POSITIVE
PH, POC UA: 7
PROTEIN, POC: NEGATIVE
SPECIFIC GRAVITY, POC UA: 1
UROBILINOGEN, POC UA: NORMAL

## 2020-06-10 PROCEDURE — 81002 URINALYSIS NONAUTO W/O SCOPE: CPT | Mod: PBBFAC,PN | Performed by: FAMILY MEDICINE

## 2020-06-10 RX ORDER — CEPHALEXIN 250 MG/1
250 CAPSULE ORAL EVERY 8 HOURS
Qty: 30 CAPSULE | Refills: 0 | Status: SHIPPED | OUTPATIENT
Start: 2020-06-10 | End: 2021-02-26 | Stop reason: ALTCHOICE

## 2020-06-10 RX ORDER — PHENAZOPYRIDINE HYDROCHLORIDE 200 MG/1
200 TABLET, FILM COATED ORAL
Qty: 15 TABLET | Refills: 0 | Status: SHIPPED | OUTPATIENT
Start: 2020-06-10 | End: 2020-06-20

## 2020-06-10 NOTE — TELEPHONE ENCOUNTER
Patient did a urine specimen due to complaints of Dysuria. POCT urine dip order in & urine sent for culture.    Pt's mother is requesting Antibiotics & Phenazopyridine.

## 2020-06-10 NOTE — PROGRESS NOTES
Patient did a urine specimen due to complaints of Dysuria. POCT urine dip order in & urine sent for culture. Msg sent to the pt's PCP.

## 2020-06-11 ENCOUNTER — LAB VISIT (OUTPATIENT)
Dept: LAB | Facility: HOSPITAL | Age: 40
End: 2020-06-11
Attending: FAMILY MEDICINE
Payer: MEDICARE

## 2020-06-11 ENCOUNTER — OFFICE VISIT (OUTPATIENT)
Dept: PRIMARY CARE CLINIC | Facility: CLINIC | Age: 40
End: 2020-06-11
Payer: MEDICARE

## 2020-06-11 VITALS
BODY MASS INDEX: 43.41 KG/M2 | TEMPERATURE: 98 F | OXYGEN SATURATION: 98 % | HEIGHT: 65 IN | DIASTOLIC BLOOD PRESSURE: 80 MMHG | WEIGHT: 260.56 LBS | SYSTOLIC BLOOD PRESSURE: 110 MMHG | HEART RATE: 73 BPM

## 2020-06-11 DIAGNOSIS — B37.2 CANDIDAL DERMATITIS: Primary | ICD-10-CM

## 2020-06-11 DIAGNOSIS — R32 URINARY INCONTINENCE IN FEMALE: ICD-10-CM

## 2020-06-11 DIAGNOSIS — R30.0 DYSURIA: ICD-10-CM

## 2020-06-11 PROCEDURE — 87147 CULTURE TYPE IMMUNOLOGIC: CPT

## 2020-06-11 PROCEDURE — 87086 URINE CULTURE/COLONY COUNT: CPT

## 2020-06-11 PROCEDURE — 99999 PR PBB SHADOW E&M-EST. PATIENT-LVL III: ICD-10-PCS | Mod: PBBFAC,,, | Performed by: FAMILY MEDICINE

## 2020-06-11 PROCEDURE — 87088 URINE BACTERIA CULTURE: CPT

## 2020-06-11 PROCEDURE — 99213 PR OFFICE/OUTPT VISIT, EST, LEVL III, 20-29 MIN: ICD-10-PCS | Mod: S$PBB,,, | Performed by: FAMILY MEDICINE

## 2020-06-11 PROCEDURE — 99999 PR PBB SHADOW E&M-EST. PATIENT-LVL III: CPT | Mod: PBBFAC,,, | Performed by: FAMILY MEDICINE

## 2020-06-11 PROCEDURE — 99213 OFFICE O/P EST LOW 20 MIN: CPT | Mod: PBBFAC,PN | Performed by: FAMILY MEDICINE

## 2020-06-11 PROCEDURE — 99213 OFFICE O/P EST LOW 20 MIN: CPT | Mod: S$PBB,,, | Performed by: FAMILY MEDICINE

## 2020-06-11 RX ORDER — CLOTRIMAZOLE 1 %
CREAM (GRAM) TOPICAL 2 TIMES DAILY
Qty: 60 G | Refills: 1 | Status: SHIPPED | OUTPATIENT
Start: 2020-06-11 | End: 2022-11-10

## 2020-06-11 RX ORDER — FLUCONAZOLE 150 MG/1
150 TABLET ORAL
Qty: 3 TABLET | Refills: 0 | Status: SHIPPED | OUTPATIENT
Start: 2020-06-11 | End: 2020-06-18

## 2020-06-11 RX ORDER — HYDROCORTISONE 25 MG/G
CREAM TOPICAL 2 TIMES DAILY
Qty: 60 G | Refills: 1 | Status: SHIPPED | OUTPATIENT
Start: 2020-06-11 | End: 2022-11-07

## 2020-06-12 LAB — BACTERIA UR CULT: ABNORMAL

## 2020-06-15 ENCOUNTER — TELEPHONE (OUTPATIENT)
Dept: PRIMARY CARE CLINIC | Facility: CLINIC | Age: 40
End: 2020-06-15

## 2020-06-15 DIAGNOSIS — N39.0 URINARY TRACT INFECTION WITH HEMATURIA, SITE UNSPECIFIED: Primary | ICD-10-CM

## 2020-06-15 DIAGNOSIS — R31.9 URINARY TRACT INFECTION WITH HEMATURIA, SITE UNSPECIFIED: Primary | ICD-10-CM

## 2020-06-15 RX ORDER — CEPHALEXIN 250 MG/1
250 CAPSULE ORAL EVERY 8 HOURS
Qty: 30 CAPSULE | Refills: 0 | Status: SHIPPED | OUTPATIENT
Start: 2020-06-15 | End: 2021-02-26 | Stop reason: ALTCHOICE

## 2020-06-15 NOTE — TELEPHONE ENCOUNTER
----- Message from Julienne Morel sent at 6/15/2020  1:46 PM CDT -----  Regarding: Mom-- [phone]  Type:  Test Results    Who Called: Mom    Name of Test (Lab/Mammo/Etc): urine results    Would the patient rather a call back or a response via [facility]? Call    Best Call Back Number:  [phone]    Additional Information:   Mom called to receive pt's results. She is requesting a call back.

## 2020-06-16 ENCOUNTER — TELEPHONE (OUTPATIENT)
Dept: PRIMARY CARE CLINIC | Facility: CLINIC | Age: 40
End: 2020-06-16

## 2020-06-16 NOTE — TELEPHONE ENCOUNTER
Call patient's guardian Letter know that the medications given for the urinary tract infection will cover the results of the culture and should cure the condition

## 2020-06-16 NOTE — TELEPHONE ENCOUNTER
Spoke with Nelly (mother), informed of lab results per provider message and recommendation.  Nelly verbalized understanding.

## 2020-06-29 ENCOUNTER — PES CALL (OUTPATIENT)
Dept: ADMINISTRATIVE | Facility: CLINIC | Age: 40
End: 2020-06-29

## 2020-07-02 NOTE — PROGRESS NOTES
"Subjective:       Patient ID: Malorie Taylor is a 40 y.o. female.    Chief Complaint: Rash (vaginal) and Vaginal Itching    39 yo female is accompanied by her mother.    She reports itchy and painful rash in the groin which has been present off and on since March.  Rashes responded to Desitin and Kayla cream.  She denies any change in temperature, activity levels. No change in personal products. She has had urinary frequency and incontinence stating that she is unable to get to the bathroom in time.  She had a positive urine dipstick and urine culture is pending.  She is currently taking Keflex.  Denies any increased thirst.  No personal or family history of diabetes.    No fever. Normal appetite.    The following portions of the patient's history were reviewed and updated as appropriate: allergies, current medications, past medical history, and problem list.      Review of Systems   Constitutional: Negative for activity change, appetite change, chills, diaphoresis, fatigue, fever and unexpected weight change.   Respiratory: Negative for choking, chest tightness, shortness of breath, wheezing and stridor.    Cardiovascular: Negative for chest pain, palpitations and leg swelling.   Gastrointestinal: Negative for abdominal pain, diarrhea and nausea.   Genitourinary: Negative for vaginal bleeding and vaginal discharge.   Skin: Positive for rash.       Objective:       Vitals:    06/11/20 0946   BP: 110/80   Pulse: 73   Temp: 98.4 °F (36.9 °C)   TempSrc: Oral   SpO2: 98%   Weight: 118.2 kg (260 lb 9.3 oz)   Height: 5' 5" (1.651 m)     Physical Exam   Constitutional: She appears well-developed and well-nourished. No distress.   Cardiovascular: Normal rate and regular rhythm.   Pulmonary/Chest: Effort normal.   Abdominal: Soft. Bowel sounds are normal. There is no tenderness.   No CVA tenderness   Skin: Rash (erythematous maculopapular confluent lesion in groin, inner thigh and perineum) noted.   Psychiatric: She " has a normal mood and affect.       Assessment:       1. Candidal dermatitis    2. Urinary incontinence in female        Plan:       1. Candidal dermatitis  -     fluconazole (DIFLUCAN) 150 MG Tab; Take 1 tablet (150 mg total) by mouth every 72 hours. for 3 doses  Dispense: 3 tablet; Refill: 0  -     clotrimazole (LOTRIMIN) 1 % cream; Apply topically 2 (two) times daily.  Dispense: 60 g; Refill: 1  -     hydrocortisone 2.5 % cream; Apply topically 2 (two) times daily.  Dispense: 60 g; Refill: 1  Counseled on skin thinning and hypopigmentation.    2. Urinary incontinence in female  Likely due to UTI  Complete Keflex. Urine culture pending.  Ensure frequent changing of pads/ liners to keep skin dry.       Disclaimer: This note has been generated using voice-recognition software. There may be typographical errors that have been missed during proof-reading   within normal limits

## 2020-07-09 ENCOUNTER — OFFICE VISIT (OUTPATIENT)
Dept: OPTOMETRY | Facility: CLINIC | Age: 40
End: 2020-07-09
Payer: MEDICARE

## 2020-07-09 DIAGNOSIS — Z01.00 ENCOUNTER FOR EXAMINATION OF EYES AND VISION WITHOUT ABNORMAL FINDINGS: Primary | ICD-10-CM

## 2020-07-09 DIAGNOSIS — Z01.00 ROUTINE EYE EXAM: ICD-10-CM

## 2020-07-09 DIAGNOSIS — H52.4 PRESBYOPIA: ICD-10-CM

## 2020-07-09 DIAGNOSIS — Z04.9 DISEASE RULED OUT AFTER EXAMINATION: ICD-10-CM

## 2020-07-09 PROCEDURE — 99213 OFFICE O/P EST LOW 20 MIN: CPT | Mod: PBBFAC | Performed by: OPTOMETRIST

## 2020-07-09 PROCEDURE — 92015 PR REFRACTION: ICD-10-PCS | Mod: ,,, | Performed by: OPTOMETRIST

## 2020-07-09 PROCEDURE — 99999 PR PBB SHADOW E&M-EST. PATIENT-LVL III: ICD-10-PCS | Mod: PBBFAC,,, | Performed by: OPTOMETRIST

## 2020-07-09 PROCEDURE — 92004 PR EYE EXAM, NEW PATIENT,COMPREHESV: ICD-10-PCS | Mod: S$PBB,,, | Performed by: OPTOMETRIST

## 2020-07-09 PROCEDURE — 92015 DETERMINE REFRACTIVE STATE: CPT | Mod: ,,, | Performed by: OPTOMETRIST

## 2020-07-09 PROCEDURE — 92004 COMPRE OPH EXAM NEW PT 1/>: CPT | Mod: S$PBB,,, | Performed by: OPTOMETRIST

## 2020-07-09 PROCEDURE — 99999 PR PBB SHADOW E&M-EST. PATIENT-LVL III: CPT | Mod: PBBFAC,,, | Performed by: OPTOMETRIST

## 2020-07-09 NOTE — PROGRESS NOTES
HPI     Pt here for her very first eye exam  Pt has been having issues with reading small print  Does not use glasses  No other complaints    Last edited by Roberta Lawson on 7/9/2020  1:54 PM. (History)            Assessment /Plan     For exam results, see Encounter Report.    Encounter for examination of eyes and vision without abnormal findings  Disease ruled out after examination  Routine eye exam  -     Ambulatory referral/consult to Optometry    Presbyopia   Rx specs or Ok to use OTC readers    RTC 1 year, sooner PRN

## 2020-07-09 NOTE — LETTER
July 9, 2020      Cece Winston MD  [address]  [facility] [address]           [facility]  [address]  [place] [address]  Phone: [phone]  Fax: [phone]          Patient: Malorie Taylor   MR Number: [id number]   YOB: 1980   Date of Visit: 7/9/2020       Dear Dr. Cece Winston:    Thank you for referring Malorie Taylor to me for evaluation. Attached you will find relevant portions of my assessment and plan of care.    If you have questions, please do not hesitate to call me. I look forward to following Malorie Taylor along with you.    Sincerely,    iLsa Mata, OD    Enclosure  CC:  No Recipients    If you would like to receive this communication electronically, please contact [email or web address] or [phone] to request more information on [employer] Link access.    For providers and/or their staff who would like to refer a patient to [facility], please contact us through our one-stop-shop provider referral line, [facility], at [phone].    If you feel you have received this communication in error or would no longer like to receive these types of communications, please e-mail [email or web address]

## 2020-07-15 DIAGNOSIS — Z71.89 COMPLEX CARE COORDINATION: ICD-10-CM

## 2020-07-17 ENCOUNTER — OFFICE VISIT (OUTPATIENT)
Dept: PRIMARY CARE CLINIC | Facility: CLINIC | Age: 40
End: 2020-07-17
Payer: MEDICARE

## 2020-07-17 VITALS
BODY MASS INDEX: 42.98 KG/M2 | TEMPERATURE: 97 F | DIASTOLIC BLOOD PRESSURE: 74 MMHG | SYSTOLIC BLOOD PRESSURE: 100 MMHG | OXYGEN SATURATION: 99 % | HEIGHT: 65 IN | WEIGHT: 257.94 LBS | HEART RATE: 82 BPM

## 2020-07-17 DIAGNOSIS — B37.2 YEAST DERMATITIS: ICD-10-CM

## 2020-07-17 DIAGNOSIS — N89.8 VAGINAL DISCHARGE: Primary | ICD-10-CM

## 2020-07-17 DIAGNOSIS — R30.0 DYSURIA: ICD-10-CM

## 2020-07-17 DIAGNOSIS — N39.0 URINARY TRACT INFECTION WITHOUT HEMATURIA, SITE UNSPECIFIED: ICD-10-CM

## 2020-07-17 LAB
BILIRUB SERPL-MCNC: NORMAL MG/DL
BLOOD URINE, POC: NORMAL
CLARITY, POC UA: NORMAL
COLOR, POC UA: YELLOW
GLUCOSE UR QL STRIP: NORMAL
KETONES UR QL STRIP: NORMAL
LEUKOCYTE ESTERASE URINE, POC: NORMAL
NITRITE, POC UA: NORMAL
PH, POC UA: 7
PROTEIN, POC: NORMAL
SPECIFIC GRAVITY, POC UA: 1.01
UROBILINOGEN, POC UA: NORMAL

## 2020-07-17 PROCEDURE — 81002 URINALYSIS NONAUTO W/O SCOPE: CPT | Mod: PBBFAC,PN | Performed by: FAMILY MEDICINE

## 2020-07-17 PROCEDURE — 99214 OFFICE O/P EST MOD 30 MIN: CPT | Mod: S$PBB,25,, | Performed by: FAMILY MEDICINE

## 2020-07-17 PROCEDURE — 87801 DETECT AGNT MULT DNA AMPLI: CPT

## 2020-07-17 PROCEDURE — 99214 OFFICE O/P EST MOD 30 MIN: CPT | Mod: PBBFAC,PN,25 | Performed by: FAMILY MEDICINE

## 2020-07-17 PROCEDURE — G0101 CA SCREEN;PELVIC/BREAST EXAM: HCPCS | Mod: PBBFAC,PN | Performed by: FAMILY MEDICINE

## 2020-07-17 PROCEDURE — 87210 SMEAR WET MOUNT SALINE/INK: CPT | Mod: S$PBB,QW,, | Performed by: FAMILY MEDICINE

## 2020-07-17 PROCEDURE — 87210 PR  SMEAR,STAIN,WET MNT,INTERP: ICD-10-PCS | Mod: S$PBB,QW,, | Performed by: FAMILY MEDICINE

## 2020-07-17 PROCEDURE — 99214 PR OFFICE/OUTPT VISIT, EST, LEVL IV, 30-39 MIN: ICD-10-PCS | Mod: S$PBB,25,, | Performed by: FAMILY MEDICINE

## 2020-07-17 PROCEDURE — 87510 GARDNER VAG DNA DIR PROBE: CPT

## 2020-07-17 PROCEDURE — 99999 PR PBB SHADOW E&M-EST. PATIENT-LVL IV: CPT | Mod: PBBFAC,,, | Performed by: FAMILY MEDICINE

## 2020-07-17 PROCEDURE — 87481 CANDIDA DNA AMP PROBE: CPT | Mod: 59

## 2020-07-17 PROCEDURE — 87086 URINE CULTURE/COLONY COUNT: CPT

## 2020-07-17 PROCEDURE — 99999 PR PBB SHADOW E&M-EST. PATIENT-LVL IV: ICD-10-PCS | Mod: PBBFAC,,, | Performed by: FAMILY MEDICINE

## 2020-07-17 RX ORDER — FLUCONAZOLE 150 MG/1
150 TABLET ORAL DAILY
Qty: 1 TABLET | Refills: 3 | Status: SHIPPED | OUTPATIENT
Start: 2020-07-17 | End: 2020-07-18

## 2020-07-17 RX ORDER — NYSTATIN 100000 [USP'U]/G
POWDER TOPICAL 2 TIMES DAILY
Qty: 120 G | Refills: 4 | Status: SHIPPED | OUTPATIENT
Start: 2020-07-17 | End: 2022-11-07

## 2020-07-17 RX ORDER — AMOXICILLIN 875 MG/1
875 TABLET, FILM COATED ORAL EVERY 12 HOURS
Qty: 14 TABLET | Refills: 0 | Status: SHIPPED | OUTPATIENT
Start: 2020-07-17 | End: 2021-02-26 | Stop reason: ALTCHOICE

## 2020-07-17 NOTE — PATIENT INSTRUCTIONS
Candida Skin Infection (Adult)  Candida is type of yeast. It grows naturally on the skin and in the mouth. If it grows out of control, it can cause an infection. Candida can cause infections in the genital area, skin folds, in the mouth, and under the breasts. Anyone can get this infection. It is more common in a person with a weak immune system, such as from diabetes, HIV, or cancer. Its also more common in someone who has been on antibiotic therapy. And its more common people who are overweight or who have incontinence. Wearing tight-fitting clothing and taking part in activities with lots of skin-to-skin contact can also put you at risk.  Candida causes the skin to become bright red and inflamed. The border of the infected part of the skin is often raised. The infection causes pain and itching. Sometimes the skin peels and bleeds. In the mouth, candida is called thrush, and may cause white thickened areas.  A Candida rash is most often treated with an antifungal cream or ointment. The rash will clear a few days after starting the medicine. Infections that dont go away may need a prescription medicine. In rare cases, a bacterial infection can also occur.  Home care  Your healthcare provider will recommend an antifungal cream or ointment for the rash. He or she may also prescribe a medicine for the itch. Follow all instructions for using these medicines. Dont use cornstarch powder. Cornstarch can cause the Candida infection to get worse.  General care:  · Keep your skin clean by washing the area twice a day.  · Use the cream as directed until your rash is gone. Once the skin has healed, keep it dry to prevent another infection.   · If you are overweight, talk with your healthcare provider about a plan to lose excess weight.  · Avoid clothes that fit tightly.  Follow-up care  Follow up with your healthcare provider, or as advised. Your rash will clear in 7 to 14 days. Call your healthcare provider if the rash  is not gone after 14 days.  When to seek medical advice  Call your healthcare provider right away if any of these occur:  · Pain or redness that gets worse or spreads  · Fluid coming from the skin  · Yellow crusts on the skin  · Fever of 100.4°F (38°C) or higher, or as directed by your healthcare provider  Date Last Reviewed: 9/1/2016  © 2714-0778 [employer]. [address] [place] [address], [place] [address]. All rights reserved. This information is not intended as a substitute for professional medical care. Always follow your healthcare professional's instructions.

## 2020-07-17 NOTE — PROGRESS NOTES
Malorie Taylor is a 40 y.o. female   Pt with vaginal itching and discharge.  Burning with urination.not sexually active  Source of history: Patient  Past Medical History:   Diagnosis Date    Allergy     Obesity 10/31/2015    Seizures     epilepsy     Patient  reports that she has never smoked. She has never used smokeless tobacco. She reports that she does not drink alcohol or use drugs.  Family History   Problem Relation Age of Onset    No Known Problems Mother     Heart disease Father     No Known Problems Sister     No Known Problems Brother     No Known Problems Brother     Acne Neg Hx     Eczema Neg Hx     Lupus Neg Hx     Psoriasis Neg Hx     Melanoma Neg Hx      ROS:   GENERAL: No fever, chills, fatigability or weight loss.  SKIN: No rashes, itching or changes in color or texture of skin.  HEAD: No headaches or recent head trauma.  URINARY/gyn history: No flank pain,  + dysuria no hematuria.+ vaginal discharge,  Reported pelvic pain.  OBJECTIVE:  APPEARANCE: normal  Vitals:    07/17/20 1422   BP: 100/74   Pulse: 82   Temp: 97.3 °F (36.3 °C)     SKIN: Normal skin turgor, no lesions.  NECK: No bruits. No cervical spine tenderness. No cervical lymphadenopathy. No thyromegaly.  BREASTS: no masses palpated in either breast or axillary area, symmetry noted.  ABDOMEN: Bowel sounds normal. No palpable aortic enlargement. No CVA tenderness. No pulsatile mass. No rebound tenderness.  PELVIC: uterus normal size, consistancy and mobility,no adenexal masses, speculum exam : normal appearing cervix  GENITALIA:normal  MENTAL STATUS: Alert. Oriented x 3. Language skills normal. Memory intact.   No suicidal ideation. Normal affect. Normal cognitive functions. Well kept appearance.    ASSESSMENT/PLAN:   Malorie was seen today for rash and dysuria.    Diagnoses and all orders for this visit:    Vaginal discharge  -     VAGINOSIS SCREEN BY DNA PROBE; Future  -     VAGINOSIS SCREEN BY DNA PROBE    Dysuria  -      POCT URINE DIPSTICK WITHOUT MICROSCOPE    Yeast dermatitis  -     nystatin (MYCOSTATIN) powder; Apply topically 2 (two) times daily.  -     fluconazole (DIFLUCAN) 150 MG Tab; Take 1 tablet (150 mg total) by mouth once daily. for 1 day    Urinary tract infection without hematuria, site unspecified  -     amoxicillin (AMOXIL) 875 MG tablet; Take 1 tablet (875 mg total) by mouth every 12 (twelve) hours.  -     Urine culture        Current Outpatient Medications:     carBAMazepine (TEGRETOL XR) 400 MG Tb12, Take 1 tablet (400 mg total) by mouth 2 (two) times daily. 1 Tablet Sustained Release 12 hr Oral Twice a day, Disp: 180 tablet, Rfl: 4    cephALEXin (KEFLEX) 250 MG capsule, Take 1 capsule (250 mg total) by mouth every 8 (eight) hours., Disp: 30 capsule, Rfl: 0    clotrimazole (LOTRIMIN) 1 % cream, Apply topically 2 (two) times daily., Disp: 60 g, Rfl: 1    docusate sodium (COLACE) 100 MG capsule, Take 1 capsule (100 mg total) by mouth 2 (two) times daily as needed for Constipation., Disp: 30 capsule, Rfl: 0    ferrous gluconate (FERGON) 325 MG Tab, Take 1 tablet (325 mg total) by mouth daily with breakfast., Disp: , Rfl: 0    fish oil-omega-3 fatty acids 300-1,000 mg capsule, Take 1 g by mouth 2 (two) times daily. , Disp: , Rfl:     gabapentin (NEURONTIN) 600 MG tablet, Take 2 tablets (1,200 mg total) by mouth 3 (three) times daily. 2 Tablet Oral Three times a day, Disp: 540 tablet, Rfl: 4    hydrocortisone 2.5 % cream, Apply topically 2 (two) times daily. As needed for hemorrhoid pain and swelling., Disp: 3.5 g, Rfl: 2    hydrocortisone 2.5 % cream, Apply topically 2 (two) times daily., Disp: 60 g, Rfl: 1    multivitamin capsule, Take 1 capsule by mouth once daily., Disp: , Rfl:     topiramate (TOPAMAX) 200 MG Tab, Take 1 tablet (200 mg total) by mouth 2 (two) times daily., Disp: 180 tablet, Rfl: 4    vitamin D 1000 units Tab, Take 185 mg by mouth once daily., Disp: , Rfl:     amoxicillin (AMOXIL) 595  MG tablet, Take 1 tablet (875 mg total) by mouth every 12 (twelve) hours., Disp: 14 tablet, Rfl: 0    cephALEXin (KEFLEX) 250 MG capsule, Take 1 capsule (250 mg total) by mouth every 8 (eight) hours., Disp: 30 capsule, Rfl: 0    folic acid (FOLVITE) 1 MG tablet, Take 1 tablet (1 mg total) by mouth once daily. (Patient not taking: Reported on 6/11/2020), Disp: 100 tablet, Rfl: 3    nystatin (MYCOSTATIN) powder, Apply topically 2 (two) times daily., Disp: 120 g, Rfl: 4      Recent Lab Results     None      Malorie was seen today for rash and dysuria.    Diagnoses and all orders for this visit:    Vaginal discharge  -     VAGINOSIS SCREEN BY DNA PROBE; Future  -     VAGINOSIS SCREEN BY DNA PROBE    Dysuria  -     POCT URINE DIPSTICK WITHOUT MICROSCOPE    Yeast dermatitis  -     nystatin (MYCOSTATIN) powder; Apply topically 2 (two) times daily.  -     fluconazole (DIFLUCAN) 150 MG Tab; Take 1 tablet (150 mg total) by mouth once daily. for 1 day    Urinary tract infection without hematuria, site unspecified  -     amoxicillin (AMOXIL) 875 MG tablet; Take 1 tablet (875 mg total) by mouth every 12 (twelve) hours.  -     Urine culture

## 2020-07-19 LAB — BACTERIA UR CULT: NORMAL

## 2020-07-23 LAB
CANDIDA RRNA VAG QL PROBE: NEGATIVE
G VAGINALIS RRNA GENITAL QL PROBE: NEGATIVE
T VAGINALIS RRNA GENITAL QL PROBE: NEGATIVE

## 2020-09-16 ENCOUNTER — PES CALL (OUTPATIENT)
Dept: ADMINISTRATIVE | Facility: CLINIC | Age: 40
End: 2020-09-16

## 2020-09-28 ENCOUNTER — PES CALL (OUTPATIENT)
Dept: ADMINISTRATIVE | Facility: CLINIC | Age: 40
End: 2020-09-28

## 2020-12-08 ENCOUNTER — PES CALL (OUTPATIENT)
Dept: ADMINISTRATIVE | Facility: CLINIC | Age: 40
End: 2020-12-08

## 2021-02-26 ENCOUNTER — OFFICE VISIT (OUTPATIENT)
Dept: PRIMARY CARE CLINIC | Facility: CLINIC | Age: 41
End: 2021-02-26
Payer: MEDICARE

## 2021-02-26 VITALS
OXYGEN SATURATION: 99 % | SYSTOLIC BLOOD PRESSURE: 100 MMHG | TEMPERATURE: 98 F | WEIGHT: 267.19 LBS | BODY MASS INDEX: 44.52 KG/M2 | HEART RATE: 76 BPM | DIASTOLIC BLOOD PRESSURE: 76 MMHG | HEIGHT: 65 IN

## 2021-02-26 DIAGNOSIS — Z23 NEED FOR INFLUENZA VACCINATION: ICD-10-CM

## 2021-02-26 DIAGNOSIS — F41.9 ANXIETY DISORDER, UNSPECIFIED TYPE: ICD-10-CM

## 2021-02-26 DIAGNOSIS — Z12.4 ROUTINE PAPANICOLAOU SMEAR: ICD-10-CM

## 2021-02-26 DIAGNOSIS — Z11.59 ENCOUNTER FOR HEPATITIS C SCREENING TEST FOR LOW RISK PATIENT: ICD-10-CM

## 2021-02-26 DIAGNOSIS — R79.9 ABNORMAL FINDING OF BLOOD CHEMISTRY, UNSPECIFIED: ICD-10-CM

## 2021-02-26 DIAGNOSIS — Z12.31 OTHER SCREENING MAMMOGRAM: ICD-10-CM

## 2021-02-26 DIAGNOSIS — Z00.00 ANNUAL PHYSICAL EXAM: Primary | ICD-10-CM

## 2021-02-26 DIAGNOSIS — E66.9 OBESITY (BMI 30.0-34.9): ICD-10-CM

## 2021-02-26 PROCEDURE — 99215 OFFICE O/P EST HI 40 MIN: CPT | Mod: PBBFAC,PN | Performed by: FAMILY MEDICINE

## 2021-02-26 PROCEDURE — 90686 IIV4 VACC NO PRSV 0.5 ML IM: CPT | Mod: PBBFAC,PN

## 2021-02-26 PROCEDURE — 99999 PR PBB SHADOW E&M-EST. PATIENT-LVL V: CPT | Mod: PBBFAC,,, | Performed by: FAMILY MEDICINE

## 2021-02-26 PROCEDURE — 99215 OFFICE O/P EST HI 40 MIN: CPT | Mod: S$PBB,,, | Performed by: FAMILY MEDICINE

## 2021-02-26 PROCEDURE — 99215 PR OFFICE/OUTPT VISIT, EST, LEVL V, 40-54 MIN: ICD-10-PCS | Mod: S$PBB,,, | Performed by: FAMILY MEDICINE

## 2021-02-26 PROCEDURE — 99999 PR PBB SHADOW E&M-EST. PATIENT-LVL V: ICD-10-PCS | Mod: PBBFAC,,, | Performed by: FAMILY MEDICINE

## 2021-03-01 ENCOUNTER — PES CALL (OUTPATIENT)
Dept: ADMINISTRATIVE | Facility: CLINIC | Age: 41
End: 2021-03-01

## 2021-03-17 ENCOUNTER — OFFICE VISIT (OUTPATIENT)
Dept: OBSTETRICS AND GYNECOLOGY | Facility: CLINIC | Age: 41
End: 2021-03-17
Payer: MEDICARE

## 2021-03-17 ENCOUNTER — LAB VISIT (OUTPATIENT)
Dept: LAB | Facility: HOSPITAL | Age: 41
End: 2021-03-17
Attending: FAMILY MEDICINE
Payer: MEDICARE

## 2021-03-17 VITALS
SYSTOLIC BLOOD PRESSURE: 110 MMHG | BODY MASS INDEX: 42.76 KG/M2 | DIASTOLIC BLOOD PRESSURE: 78 MMHG | WEIGHT: 256.94 LBS

## 2021-03-17 DIAGNOSIS — E66.9 OBESITY (BMI 30.0-34.9): ICD-10-CM

## 2021-03-17 DIAGNOSIS — Z01.419 WELL WOMAN EXAM WITH ROUTINE GYNECOLOGICAL EXAM: Primary | ICD-10-CM

## 2021-03-17 DIAGNOSIS — R79.9 ABNORMAL FINDING OF BLOOD CHEMISTRY, UNSPECIFIED: ICD-10-CM

## 2021-03-17 DIAGNOSIS — Z11.59 ENCOUNTER FOR HEPATITIS C SCREENING TEST FOR LOW RISK PATIENT: ICD-10-CM

## 2021-03-17 DIAGNOSIS — F41.9 ANXIETY DISORDER, UNSPECIFIED TYPE: ICD-10-CM

## 2021-03-17 DIAGNOSIS — Z12.4 SCREENING FOR MALIGNANT NEOPLASM OF CERVIX: ICD-10-CM

## 2021-03-17 DIAGNOSIS — Z00.00 ANNUAL PHYSICAL EXAM: ICD-10-CM

## 2021-03-17 LAB
ALBUMIN SERPL BCP-MCNC: 3.8 G/DL (ref 3.5–5.2)
ALP SERPL-CCNC: 105 U/L (ref 55–135)
ALT SERPL W/O P-5'-P-CCNC: 21 U/L (ref 10–44)
ANION GAP SERPL CALC-SCNC: 8 MMOL/L (ref 8–16)
AST SERPL-CCNC: 19 U/L (ref 10–40)
BASOPHILS # BLD AUTO: 0.02 K/UL (ref 0–0.2)
BASOPHILS NFR BLD: 0.3 % (ref 0–1.9)
BILIRUB SERPL-MCNC: 0.4 MG/DL (ref 0.1–1)
BUN SERPL-MCNC: 11 MG/DL (ref 6–20)
CALCIUM SERPL-MCNC: 8.9 MG/DL (ref 8.7–10.5)
CHLORIDE SERPL-SCNC: 109 MMOL/L (ref 95–110)
CHOLEST SERPL-MCNC: 216 MG/DL (ref 120–199)
CHOLEST/HDLC SERPL: 3.9 {RATIO} (ref 2–5)
CO2 SERPL-SCNC: 23 MMOL/L (ref 23–29)
CREAT SERPL-MCNC: 0.7 MG/DL (ref 0.5–1.4)
DIFFERENTIAL METHOD: ABNORMAL
EOSINOPHIL # BLD AUTO: 0.1 K/UL (ref 0–0.5)
EOSINOPHIL NFR BLD: 1.4 % (ref 0–8)
ERYTHROCYTE [DISTWIDTH] IN BLOOD BY AUTOMATED COUNT: 12.1 % (ref 11.5–14.5)
EST. GFR  (AFRICAN AMERICAN): >60 ML/MIN/1.73 M^2
EST. GFR  (NON AFRICAN AMERICAN): >60 ML/MIN/1.73 M^2
GLUCOSE SERPL-MCNC: 83 MG/DL (ref 70–110)
HCT VFR BLD AUTO: 37 % (ref 37–48.5)
HDLC SERPL-MCNC: 56 MG/DL (ref 40–75)
HDLC SERPL: 25.9 % (ref 20–50)
HGB BLD-MCNC: 12.4 G/DL (ref 12–16)
IMM GRANULOCYTES # BLD AUTO: 0.02 K/UL (ref 0–0.04)
IMM GRANULOCYTES NFR BLD AUTO: 0.3 % (ref 0–0.5)
LDLC SERPL CALC-MCNC: 141.6 MG/DL (ref 63–159)
LYMPHOCYTES # BLD AUTO: 1.6 K/UL (ref 1–4.8)
LYMPHOCYTES NFR BLD: 28.7 % (ref 18–48)
MCH RBC QN AUTO: 35 PG (ref 27–31)
MCHC RBC AUTO-ENTMCNC: 33.5 G/DL (ref 32–36)
MCV RBC AUTO: 105 FL (ref 82–98)
MONOCYTES # BLD AUTO: 0.4 K/UL (ref 0.3–1)
MONOCYTES NFR BLD: 7.2 % (ref 4–15)
NEUTROPHILS # BLD AUTO: 3.6 K/UL (ref 1.8–7.7)
NEUTROPHILS NFR BLD: 62.1 % (ref 38–73)
NONHDLC SERPL-MCNC: 160 MG/DL
NRBC BLD-RTO: 0 /100 WBC
PLATELET # BLD AUTO: 272 K/UL (ref 150–350)
PMV BLD AUTO: 9.6 FL (ref 9.2–12.9)
POTASSIUM SERPL-SCNC: 4 MMOL/L (ref 3.5–5.1)
PROT SERPL-MCNC: 7.9 G/DL (ref 6–8.4)
RBC # BLD AUTO: 3.54 M/UL (ref 4–5.4)
SODIUM SERPL-SCNC: 140 MMOL/L (ref 136–145)
TRIGL SERPL-MCNC: 92 MG/DL (ref 30–150)
TSH SERPL DL<=0.005 MIU/L-ACNC: 1.86 UIU/ML (ref 0.4–4)
WBC # BLD AUTO: 5.72 K/UL (ref 3.9–12.7)

## 2021-03-17 PROCEDURE — 80061 LIPID PANEL: CPT | Performed by: FAMILY MEDICINE

## 2021-03-17 PROCEDURE — 36415 COLL VENOUS BLD VENIPUNCTURE: CPT | Mod: PN | Performed by: FAMILY MEDICINE

## 2021-03-17 PROCEDURE — G0101 PR CA SCREEN;PELVIC/BREAST EXAM: ICD-10-PCS | Mod: S$PBB,,, | Performed by: NURSE PRACTITIONER

## 2021-03-17 PROCEDURE — 86803 HEPATITIS C AB TEST: CPT | Performed by: FAMILY MEDICINE

## 2021-03-17 PROCEDURE — 99999 PR PBB SHADOW E&M-EST. PATIENT-LVL III: CPT | Mod: PBBFAC,,, | Performed by: NURSE PRACTITIONER

## 2021-03-17 PROCEDURE — 80053 COMPREHEN METABOLIC PANEL: CPT | Performed by: FAMILY MEDICINE

## 2021-03-17 PROCEDURE — 87624 HPV HI-RISK TYP POOLED RSLT: CPT | Mod: 91 | Performed by: NURSE PRACTITIONER

## 2021-03-17 PROCEDURE — 87624 HPV HI-RISK TYP POOLED RSLT: CPT | Performed by: NURSE PRACTITIONER

## 2021-03-17 PROCEDURE — 99213 OFFICE O/P EST LOW 20 MIN: CPT | Mod: PBBFAC,PN | Performed by: NURSE PRACTITIONER

## 2021-03-17 PROCEDURE — 85025 COMPLETE CBC W/AUTO DIFF WBC: CPT | Performed by: FAMILY MEDICINE

## 2021-03-17 PROCEDURE — 84443 ASSAY THYROID STIM HORMONE: CPT | Performed by: FAMILY MEDICINE

## 2021-03-17 PROCEDURE — G0101 CA SCREEN;PELVIC/BREAST EXAM: HCPCS | Mod: S$PBB,,, | Performed by: NURSE PRACTITIONER

## 2021-03-17 PROCEDURE — 99999 PR PBB SHADOW E&M-EST. PATIENT-LVL III: ICD-10-PCS | Mod: PBBFAC,,, | Performed by: NURSE PRACTITIONER

## 2021-03-17 PROCEDURE — 88175 CYTOPATH C/V AUTO FLUID REDO: CPT | Performed by: NURSE PRACTITIONER

## 2021-03-17 PROCEDURE — G0101 CA SCREEN;PELVIC/BREAST EXAM: HCPCS | Mod: PBBFAC,PN | Performed by: NURSE PRACTITIONER

## 2021-03-18 LAB — HCV AB SERPL QL IA: NEGATIVE

## 2021-03-27 LAB
CLINICAL INFO: NORMAL
CYTO CVX: NORMAL
CYTOLOGIST CVX/VAG CYTO: NORMAL
CYTOLOGIST CVX/VAG CYTO: NORMAL
CYTOLOGY CMNT CVX/VAG CYTO-IMP: NORMAL
CYTOLOGY PAP THIN PREP EXPLANATION: NORMAL
DATE OF PREVIOUS PAP: NORMAL
DATE PREVIOUS BX: NO
HPV I/H RISK 4 DNA CVX QL NAA+PROBE: NORMAL
LMP START DATE: NORMAL
SPECIMEN SOURCE CVX/VAG CYTO: NORMAL
STAT OF ADQ CVX/VAG CYTO-IMP: NORMAL

## 2021-03-29 ENCOUNTER — TELEPHONE (OUTPATIENT)
Dept: OBSTETRICS AND GYNECOLOGY | Facility: CLINIC | Age: 41
End: 2021-03-29

## 2021-03-29 LAB — HPV E6+E7 MRNA CVX QL NAA+PROBE: NOT DETECTED

## 2021-05-25 ENCOUNTER — PES CALL (OUTPATIENT)
Dept: ADMINISTRATIVE | Facility: CLINIC | Age: 41
End: 2021-05-25

## 2021-05-26 ENCOUNTER — HOSPITAL ENCOUNTER (OUTPATIENT)
Dept: RADIOLOGY | Facility: HOSPITAL | Age: 41
Discharge: HOME | End: 2021-05-26
Attending: FAMILY MEDICINE
Payer: MEDICARE

## 2021-05-26 DIAGNOSIS — Z12.31 OTHER SCREENING MAMMOGRAM: ICD-10-CM

## 2021-05-26 PROCEDURE — 77063 MAMMO DIGITAL SCREENING BILAT WITH TOMO: ICD-10-PCS | Mod: 26,,, | Performed by: RADIOLOGY

## 2021-05-26 PROCEDURE — 77067 SCR MAMMO BI INCL CAD: CPT | Mod: 26,,, | Performed by: RADIOLOGY

## 2021-05-26 PROCEDURE — 77063 BREAST TOMOSYNTHESIS BI: CPT | Mod: 26,,, | Performed by: RADIOLOGY

## 2021-05-26 PROCEDURE — 77067 SCR MAMMO BI INCL CAD: CPT | Mod: TC,PN

## 2021-05-26 PROCEDURE — 77067 MAMMO DIGITAL SCREENING BILAT WITH TOMO: ICD-10-PCS | Mod: 26,,, | Performed by: RADIOLOGY

## 2021-06-21 ENCOUNTER — PES CALL (OUTPATIENT)
Dept: ADMINISTRATIVE | Facility: CLINIC | Age: 41
End: 2021-06-21

## 2021-06-25 ENCOUNTER — PES CALL (OUTPATIENT)
Dept: ADMINISTRATIVE | Facility: CLINIC | Age: 41
End: 2021-06-25

## 2021-07-29 ENCOUNTER — PES CALL (OUTPATIENT)
Dept: ADMINISTRATIVE | Facility: CLINIC | Age: 41
End: 2021-07-29

## 2021-08-26 ENCOUNTER — PES CALL (OUTPATIENT)
Dept: ADMINISTRATIVE | Facility: CLINIC | Age: 41
End: 2021-08-26

## 2021-09-21 ENCOUNTER — PES CALL (OUTPATIENT)
Dept: ADMINISTRATIVE | Facility: CLINIC | Age: 41
End: 2021-09-21

## 2021-10-08 DIAGNOSIS — G40.909 SEIZURE DISORDER: ICD-10-CM

## 2021-10-08 RX ORDER — GABAPENTIN 600 MG/1
1200 TABLET ORAL 3 TIMES DAILY
Qty: 540 TABLET | Refills: 3 | Status: CANCELLED | OUTPATIENT
Start: 2021-10-08

## 2021-10-11 DIAGNOSIS — G40.909 SEIZURE DISORDER: ICD-10-CM

## 2021-10-11 RX ORDER — GABAPENTIN 600 MG/1
1200 TABLET ORAL 3 TIMES DAILY
Qty: 540 TABLET | Refills: 0 | OUTPATIENT
Start: 2021-10-11

## 2021-10-12 ENCOUNTER — TELEPHONE (OUTPATIENT)
Dept: NEUROLOGY | Facility: CLINIC | Age: 41
End: 2021-10-12

## 2021-10-13 DIAGNOSIS — G40.909 SEIZURE DISORDER: ICD-10-CM

## 2021-10-13 RX ORDER — CARBAMAZEPINE 400 MG/1
400 TABLET, EXTENDED RELEASE ORAL 2 TIMES DAILY
Qty: 180 TABLET | Refills: 3 | Status: SHIPPED | OUTPATIENT
Start: 2021-10-13 | End: 2021-12-07 | Stop reason: SDUPTHER

## 2021-10-13 RX ORDER — TOPIRAMATE 200 MG/1
200 TABLET ORAL 2 TIMES DAILY
Qty: 180 TABLET | Refills: 3 | Status: SHIPPED | OUTPATIENT
Start: 2021-10-13 | End: 2021-12-07 | Stop reason: SDUPTHER

## 2021-10-13 RX ORDER — GABAPENTIN 600 MG/1
1200 TABLET ORAL 3 TIMES DAILY
Qty: 540 TABLET | Refills: 3 | Status: SHIPPED | OUTPATIENT
Start: 2021-10-13 | End: 2021-12-07 | Stop reason: SDUPTHER

## 2021-10-19 ENCOUNTER — TELEPHONE (OUTPATIENT)
Dept: NEUROLOGY | Facility: CLINIC | Age: 41
End: 2021-10-19
Payer: MEDICARE

## 2021-11-17 ENCOUNTER — OFFICE VISIT (OUTPATIENT)
Dept: NEUROLOGY | Facility: CLINIC | Age: 41
End: 2021-11-17
Payer: MEDICARE

## 2021-11-17 VITALS
DIASTOLIC BLOOD PRESSURE: 80 MMHG | SYSTOLIC BLOOD PRESSURE: 127 MMHG | HEART RATE: 78 BPM | BODY MASS INDEX: 42.76 KG/M2 | HEIGHT: 65 IN

## 2021-11-17 DIAGNOSIS — G40.909 SEIZURE DISORDER: Primary | ICD-10-CM

## 2021-11-17 DIAGNOSIS — E66.01 SEVERE OBESITY: ICD-10-CM

## 2021-11-17 DIAGNOSIS — F79 INTELLECTUAL DISABILITY: ICD-10-CM

## 2021-11-17 DIAGNOSIS — Z79.818 LONG TERM (CURRENT) USE OF OTHER AGENTS AFFECTING ESTROGEN RECEPTORS AND ESTROGEN LEVELS: ICD-10-CM

## 2021-11-17 PROCEDURE — 99999 PR PBB SHADOW E&M-EST. PATIENT-LVL III: CPT | Mod: PBBFAC,,, | Performed by: PSYCHIATRY & NEUROLOGY

## 2021-11-17 PROCEDURE — 99999 PR PBB SHADOW E&M-EST. PATIENT-LVL III: ICD-10-PCS | Mod: PBBFAC,,, | Performed by: PSYCHIATRY & NEUROLOGY

## 2021-11-17 PROCEDURE — 99215 PR OFFICE/OUTPT VISIT, EST, LEVL V, 40-54 MIN: ICD-10-PCS | Mod: S$PBB,,, | Performed by: PSYCHIATRY & NEUROLOGY

## 2021-11-17 PROCEDURE — 99213 OFFICE O/P EST LOW 20 MIN: CPT | Mod: PBBFAC | Performed by: PSYCHIATRY & NEUROLOGY

## 2021-11-17 PROCEDURE — 99215 OFFICE O/P EST HI 40 MIN: CPT | Mod: S$PBB,,, | Performed by: PSYCHIATRY & NEUROLOGY

## 2021-12-07 ENCOUNTER — TELEPHONE (OUTPATIENT)
Dept: NEUROLOGY | Facility: CLINIC | Age: 41
End: 2021-12-07
Payer: MEDICARE

## 2021-12-07 DIAGNOSIS — G40.909 SEIZURE DISORDER: Primary | ICD-10-CM

## 2021-12-07 RX ORDER — CARBAMAZEPINE 400 MG/1
400 TABLET, EXTENDED RELEASE ORAL 2 TIMES DAILY
Qty: 180 TABLET | Refills: 3 | Status: SHIPPED | OUTPATIENT
Start: 2021-12-07 | End: 2022-09-23 | Stop reason: SDUPTHER

## 2021-12-07 RX ORDER — TOPIRAMATE 200 MG/1
200 TABLET ORAL 2 TIMES DAILY
Qty: 180 TABLET | Refills: 3 | Status: SHIPPED | OUTPATIENT
Start: 2021-12-07 | End: 2022-09-23 | Stop reason: SDUPTHER

## 2021-12-07 RX ORDER — GABAPENTIN 600 MG/1
1200 TABLET ORAL 3 TIMES DAILY
Qty: 540 TABLET | Refills: 3 | Status: SHIPPED | OUTPATIENT
Start: 2021-12-07 | End: 2022-09-23 | Stop reason: SDUPTHER

## 2021-12-21 ENCOUNTER — HOSPITAL ENCOUNTER (OUTPATIENT)
Dept: RADIOLOGY | Facility: CLINIC | Age: 41
Discharge: HOME | End: 2021-12-21
Attending: PSYCHIATRY & NEUROLOGY
Payer: MEDICARE

## 2021-12-21 DIAGNOSIS — G40.909 SEIZURE DISORDER: ICD-10-CM

## 2021-12-21 DIAGNOSIS — Z79.818 LONG TERM (CURRENT) USE OF OTHER AGENTS AFFECTING ESTROGEN RECEPTORS AND ESTROGEN LEVELS: ICD-10-CM

## 2021-12-21 PROCEDURE — 77080 DXA BONE DENSITY AXIAL: CPT | Mod: TC

## 2021-12-21 PROCEDURE — 77080 DXA BONE DENSITY AXIAL: CPT | Mod: 26,,, | Performed by: INTERNAL MEDICINE

## 2021-12-21 PROCEDURE — 77080 DEXA BONE DENSITY SPINE HIP: ICD-10-PCS | Mod: 26,,, | Performed by: INTERNAL MEDICINE

## 2022-01-05 ENCOUNTER — TELEPHONE (OUTPATIENT)
Dept: PRIMARY CARE CLINIC | Facility: CLINIC | Age: 42
End: 2022-01-05
Payer: MEDICARE

## 2022-01-05 DIAGNOSIS — R79.9 ABNORMAL FINDING OF BLOOD CHEMISTRY, UNSPECIFIED: ICD-10-CM

## 2022-01-05 DIAGNOSIS — E66.9 OBESITY (BMI 30.0-34.9): ICD-10-CM

## 2022-01-05 DIAGNOSIS — F41.9 ANXIETY DISORDER, UNSPECIFIED TYPE: ICD-10-CM

## 2022-01-05 DIAGNOSIS — Z00.00 ROUTINE GENERAL MEDICAL EXAMINATION AT A HEALTH CARE FACILITY: Primary | ICD-10-CM

## 2022-01-05 NOTE — TELEPHONE ENCOUNTER
----- Message from Shiloh Marie sent at 1/5/2022 10:08 AM CST -----  Contact: Mom [phone]  Patient would like to get medical advice.        Would you like a call back, or a response through your [facility] portal?:   call back    Comments:   Calling to have lab orders added for upcoming appt.

## 2022-02-15 ENCOUNTER — PES CALL (OUTPATIENT)
Dept: ADMINISTRATIVE | Facility: CLINIC | Age: 42
End: 2022-02-15
Payer: MEDICARE

## 2022-03-02 ENCOUNTER — LAB VISIT (OUTPATIENT)
Dept: LAB | Facility: HOSPITAL | Age: 42
End: 2022-03-02
Attending: FAMILY MEDICINE
Payer: MEDICARE

## 2022-03-02 DIAGNOSIS — F41.9 ANXIETY DISORDER, UNSPECIFIED TYPE: ICD-10-CM

## 2022-03-02 DIAGNOSIS — Z00.00 ROUTINE GENERAL MEDICAL EXAMINATION AT A HEALTH CARE FACILITY: ICD-10-CM

## 2022-03-02 DIAGNOSIS — R79.9 ABNORMAL FINDING OF BLOOD CHEMISTRY, UNSPECIFIED: ICD-10-CM

## 2022-03-02 LAB
ALBUMIN SERPL BCP-MCNC: 3.5 G/DL (ref 3.5–5.2)
ALP SERPL-CCNC: 101 U/L (ref 55–135)
ALT SERPL W/O P-5'-P-CCNC: 14 U/L (ref 10–44)
ANION GAP SERPL CALC-SCNC: 6 MMOL/L (ref 8–16)
AST SERPL-CCNC: 16 U/L (ref 10–40)
BASOPHILS # BLD AUTO: 0.02 K/UL (ref 0–0.2)
BASOPHILS NFR BLD: 0.3 % (ref 0–1.9)
BILIRUB SERPL-MCNC: 0.3 MG/DL (ref 0.1–1)
BUN SERPL-MCNC: 10 MG/DL (ref 6–20)
CALCIUM SERPL-MCNC: 8.8 MG/DL (ref 8.7–10.5)
CHLORIDE SERPL-SCNC: 107 MMOL/L (ref 95–110)
CHOLEST SERPL-MCNC: 222 MG/DL (ref 120–199)
CHOLEST/HDLC SERPL: 3.8 {RATIO} (ref 2–5)
CO2 SERPL-SCNC: 23 MMOL/L (ref 23–29)
CREAT SERPL-MCNC: 0.7 MG/DL (ref 0.5–1.4)
DIFFERENTIAL METHOD: ABNORMAL
EOSINOPHIL # BLD AUTO: 0.1 K/UL (ref 0–0.5)
EOSINOPHIL NFR BLD: 2 % (ref 0–8)
ERYTHROCYTE [DISTWIDTH] IN BLOOD BY AUTOMATED COUNT: 12.1 % (ref 11.5–14.5)
EST. GFR  (AFRICAN AMERICAN): >60 ML/MIN/1.73 M^2
EST. GFR  (NON AFRICAN AMERICAN): >60 ML/MIN/1.73 M^2
GLUCOSE SERPL-MCNC: 88 MG/DL (ref 70–110)
HCT VFR BLD AUTO: 37 % (ref 37–48.5)
HDLC SERPL-MCNC: 58 MG/DL (ref 40–75)
HDLC SERPL: 26.1 % (ref 20–50)
HGB BLD-MCNC: 12.1 G/DL (ref 12–16)
IMM GRANULOCYTES # BLD AUTO: 0.02 K/UL (ref 0–0.04)
IMM GRANULOCYTES NFR BLD AUTO: 0.3 % (ref 0–0.5)
LDLC SERPL CALC-MCNC: 146.4 MG/DL (ref 63–159)
LYMPHOCYTES # BLD AUTO: 1.8 K/UL (ref 1–4.8)
LYMPHOCYTES NFR BLD: 29.9 % (ref 18–48)
MCH RBC QN AUTO: 34.9 PG (ref 27–31)
MCHC RBC AUTO-ENTMCNC: 32.7 G/DL (ref 32–36)
MCV RBC AUTO: 107 FL (ref 82–98)
MONOCYTES # BLD AUTO: 0.5 K/UL (ref 0.3–1)
MONOCYTES NFR BLD: 7.8 % (ref 4–15)
NEUTROPHILS # BLD AUTO: 3.6 K/UL (ref 1.8–7.7)
NEUTROPHILS NFR BLD: 59.7 % (ref 38–73)
NONHDLC SERPL-MCNC: 164 MG/DL
NRBC BLD-RTO: 0 /100 WBC
PLATELET # BLD AUTO: 259 K/UL (ref 150–450)
PMV BLD AUTO: 9.3 FL (ref 9.2–12.9)
POTASSIUM SERPL-SCNC: 4 MMOL/L (ref 3.5–5.1)
PROT SERPL-MCNC: 7.3 G/DL (ref 6–8.4)
RBC # BLD AUTO: 3.47 M/UL (ref 4–5.4)
SODIUM SERPL-SCNC: 136 MMOL/L (ref 136–145)
TRIGL SERPL-MCNC: 88 MG/DL (ref 30–150)
TSH SERPL DL<=0.005 MIU/L-ACNC: 3.06 UIU/ML (ref 0.4–4)
WBC # BLD AUTO: 6.03 K/UL (ref 3.9–12.7)

## 2022-03-02 PROCEDURE — 36415 COLL VENOUS BLD VENIPUNCTURE: CPT | Mod: PN | Performed by: FAMILY MEDICINE

## 2022-03-02 PROCEDURE — 80061 LIPID PANEL: CPT | Performed by: FAMILY MEDICINE

## 2022-03-02 PROCEDURE — 80053 COMPREHEN METABOLIC PANEL: CPT | Performed by: FAMILY MEDICINE

## 2022-03-02 PROCEDURE — 84443 ASSAY THYROID STIM HORMONE: CPT | Performed by: FAMILY MEDICINE

## 2022-03-02 PROCEDURE — 85025 COMPLETE CBC W/AUTO DIFF WBC: CPT | Performed by: FAMILY MEDICINE

## 2022-03-09 ENCOUNTER — OFFICE VISIT (OUTPATIENT)
Dept: PRIMARY CARE CLINIC | Facility: CLINIC | Age: 42
End: 2022-03-09
Payer: MEDICARE

## 2022-03-09 VITALS
BODY MASS INDEX: 43.56 KG/M2 | HEIGHT: 65 IN | DIASTOLIC BLOOD PRESSURE: 80 MMHG | OXYGEN SATURATION: 99 % | SYSTOLIC BLOOD PRESSURE: 110 MMHG | TEMPERATURE: 98 F | WEIGHT: 261.44 LBS | HEART RATE: 67 BPM

## 2022-03-09 DIAGNOSIS — Z23 NEED FOR IMMUNIZATION AGAINST INFLUENZA: ICD-10-CM

## 2022-03-09 DIAGNOSIS — Z12.31 SCREENING MAMMOGRAM FOR HIGH-RISK PATIENT: Primary | ICD-10-CM

## 2022-03-09 PROCEDURE — 99999 PR PBB SHADOW E&M-EST. PATIENT-LVL IV: CPT | Mod: PBBFAC,,, | Performed by: FAMILY MEDICINE

## 2022-03-09 PROCEDURE — 99396 PREV VISIT EST AGE 40-64: CPT | Mod: S$PBB,,, | Performed by: FAMILY MEDICINE

## 2022-03-09 PROCEDURE — G0008 ADMIN INFLUENZA VIRUS VAC: HCPCS | Mod: PBBFAC,PN

## 2022-03-09 PROCEDURE — 99214 OFFICE O/P EST MOD 30 MIN: CPT | Mod: PBBFAC,PN,25 | Performed by: FAMILY MEDICINE

## 2022-03-09 PROCEDURE — 99396 PR PREVENTIVE VISIT,EST,40-64: ICD-10-PCS | Mod: S$PBB,,, | Performed by: FAMILY MEDICINE

## 2022-03-09 PROCEDURE — 99999 PR PBB SHADOW E&M-EST. PATIENT-LVL IV: ICD-10-PCS | Mod: PBBFAC,,, | Performed by: FAMILY MEDICINE

## 2022-03-09 NOTE — PROGRESS NOTES
Two patient identifiers verified.  Allergies reviewed.  Flu 0.5 ml IM administered to right deltoid per MD order.  Patient tolerated injection well; no redness, bleeding, or bruising noted to injection site.  Patient instructed to remain in clinic setting for 15 minutes.  Verbalizes understanding.

## 2022-03-10 NOTE — PROGRESS NOTES
Malorie Taylor is a 42 y.o. female   Routine physical  Source of history: Patient  Past Medical History:   Diagnosis Date    Allergy     Obesity 10/31/2015    Seizures     epilepsy     Patient  reports that she has never smoked. She has never used smokeless tobacco. She reports that she does not drink alcohol and does not use drugs.  Family History   Problem Relation Age of Onset    No Known Problems Mother     Heart disease Father     No Known Problems Sister     No Known Problems Brother     No Known Problems Brother     Acne Neg Hx     Eczema Neg Hx     Lupus Neg Hx     Psoriasis Neg Hx     Melanoma Neg Hx     Breast cancer Neg Hx     Colon cancer Neg Hx     Ovarian cancer Neg Hx      ROS:  GENERAL: No fever, chills, fatigability or weight loss.  SKIN: No rashes, itching or changes in color or texture of skin.  HEAD: No headaches or recent head trauma.  EYES: Visual acuity fine. No photophobia, ocular pain or diplopia.  EARS: Denies ear pain, discharge or vertigo.  NOSE: No loss of smell, no epistaxis or postnasal drip.  MOUTH & THROAT: No hoarseness or change in voice. No excessive gum bleeding.  NODES: Denies swollen glands.  CHEST: Denies VILLELA, cyanosis, wheezing, cough and sputum production.  CARDIOVASCULAR: Denies chest pain, PND, orthopnea or reduced exercise tolerance.  ABDOMEN: Appetite fine. No weight loss. Denies diarrhea, abdominal pain, hematemesis or blood in stool.  URINARY: No flank pain, dysuria or hematuria.  PERIPHERAL VASCULAR: No claudication or cyanosis.  MUSCULOSKELETAL: No joint stiffness or swelling. Denies back pain.  NEUROLOGIC: No history of seizures, paralysis, alteration of gait or coordination.    OBJECTIVE:  APPEARANCE:  Overweight no acute distress  Vitals:    03/09/22 1527   BP: 110/80   Pulse: 67   Temp: 97.8 °F (36.6 °C)     SKIN: Normal skin turgor, no lesions.  HEENT: Both external auditory canals clear. Both tympanic membranes intact. PERRL. EOMI. Disk  margins sharp. No tonsillar enlargement. No pharyngeal erythema or exudate. No stridor.  NECK: No bruits. No cervical spine tenderness. No cervical lymphadenopathy. No thyromegaly.  NODES: No cervical, axillary or inguinal lymph node enlargement.  CHEST: Breath sounds clear bilaterally. Lungs clear to auscultation & percussion.   Good air movement. No rales. No retractions. No rhonchi. No stridor. No wheezes.  CARDIOVASCULAR: Normal S1, S2. No murmurs. No edema.  BREASTS: no masses palpated in either breast or axillary area, symmetry noted.  ABDOMEN: Bowel sounds normal. No palpable aortic enlargement.   No CVA tenderness. No pulsatile mass. No rebound tenderness.  PERIPHERAL VASCULAR: Femoral pulses present and symmetrical. No edema.  MUSCULOSKELETAL: Degenerative changes of both ankles, foot, knee, wrist and hand.  BACK: No CVA tenderness. There is no spasm, tenderness or radiculopathy noted with palpation and there is full range of motion.   NEUROLOGIC:   Cranial Nerves: II-XII grossly intact.  Motor: 5/5 strength major flexors/extensors. No tremor.  DTR's: Knees, Ankles 2+ and equal bilaterally; downgoing toes.  Sensory: Intact to light touch distally.  Gait & Posture: Normal gait and fine motion. No cerebellar signs.  MENTAL STATUS: Alert. Oriented x 3. Language skills normal.   Memory intact. No suicidal ideation. Normal affect. Normal cognitive functions. Well kept appearance.    ASSESSMENT/PLAN:   Malorie was seen today for annual exam.    Diagnoses and all orders for this visit:    Screening mammogram for high-risk patient  -     Mammo Digital Screening Bilat; Future    Need for immunization against influenza  -     Influenza - Quadrivalent *Preferred* (6 months+) (PF)    labs reviewed elevated cholesterol with low cardiac risk score.  Will contact pt with results of pending tests.

## 2022-04-26 ENCOUNTER — PES CALL (OUTPATIENT)
Dept: PRIMARY CARE CLINIC | Facility: CLINIC | Age: 42
End: 2022-04-26
Payer: MEDICARE

## 2022-05-25 ENCOUNTER — OFFICE VISIT (OUTPATIENT)
Dept: INTERNAL MEDICINE | Facility: CLINIC | Age: 42
End: 2022-05-25
Payer: MEDICARE

## 2022-05-25 VITALS
SYSTOLIC BLOOD PRESSURE: 110 MMHG | HEART RATE: 80 BPM | RESPIRATION RATE: 16 BRPM | HEIGHT: 65 IN | WEIGHT: 255.31 LBS | BODY MASS INDEX: 42.54 KG/M2 | DIASTOLIC BLOOD PRESSURE: 74 MMHG

## 2022-05-25 DIAGNOSIS — E66.01 SEVERE OBESITY: ICD-10-CM

## 2022-05-25 DIAGNOSIS — G40.909 SEIZURE DISORDER: ICD-10-CM

## 2022-05-25 DIAGNOSIS — Z00.00 ENCOUNTER FOR PREVENTIVE HEALTH EXAMINATION: Primary | ICD-10-CM

## 2022-05-25 DIAGNOSIS — Z79.818 LONG TERM (CURRENT) USE OF OTHER AGENTS AFFECTING ESTROGEN RECEPTORS AND ESTROGEN LEVELS: ICD-10-CM

## 2022-05-25 DIAGNOSIS — F79 INTELLECTUAL DISABILITY: ICD-10-CM

## 2022-05-25 PROCEDURE — 99214 OFFICE O/P EST MOD 30 MIN: CPT | Mod: PBBFAC | Performed by: NURSE PRACTITIONER

## 2022-05-25 PROCEDURE — G0439 PPPS, SUBSEQ VISIT: HCPCS | Mod: ,,, | Performed by: NURSE PRACTITIONER

## 2022-05-25 PROCEDURE — 99999 PR PBB SHADOW E&M-EST. PATIENT-LVL IV: ICD-10-PCS | Mod: PBBFAC,,, | Performed by: NURSE PRACTITIONER

## 2022-05-25 PROCEDURE — G0439 PR MEDICARE ANNUAL WELLNESS SUBSEQUENT VISIT: ICD-10-PCS | Mod: ,,, | Performed by: NURSE PRACTITIONER

## 2022-05-25 PROCEDURE — 99999 PR PBB SHADOW E&M-EST. PATIENT-LVL IV: CPT | Mod: PBBFAC,,, | Performed by: NURSE PRACTITIONER

## 2022-05-25 NOTE — PROGRESS NOTES
"Malorie Taylor presented for a  Medicare AWV and comprehensive Health Risk Assessment today. She presents today with her father who provides some of the history. The following components were reviewed and updated:    · Medical history  · Family History  · Social history  · Allergies and Current Medications  · Health Risk Assessment  · Health Maintenance  · Care Team         ** See Completed Assessments for Annual Wellness Visit within the encounter summary.**         The following assessments were completed:  · Living Situation  · CAGE  · Depression Screening - Unable to assess  · Timed Get Up and Go  · Whisper Test  · Cognitive Function Screening - N/A intellectual disability  · Nutrition Screening  · ADL Screening  · PAQ Screening        Vitals:    05/25/22 1410   BP: 110/74   BP Location: Right arm   Patient Position: Sitting   BP Method: Large (Manual)   Pulse: 80   Resp: 16   Weight: 115.8 kg (255 lb 4.7 oz)   Height: 5' 5" (1.651 m)     Body mass index is 42.48 kg/m².     Physical Exam  Vitals reviewed.   Constitutional:       Appearance: Normal appearance. She is obese.   HENT:      Head: Normocephalic.   Cardiovascular:      Rate and Rhythm: Normal rate.   Pulmonary:      Effort: Pulmonary effort is normal.   Abdominal:      General: Bowel sounds are normal.   Musculoskeletal:         General: Normal range of motion.      Cervical back: Normal range of motion.      Right lower leg: No edema.      Left lower leg: No edema.   Skin:     General: Skin is warm and dry.      Capillary Refill: Capillary refill takes less than 2 seconds.   Neurological:      Mental Status: She is alert. Mental status is at baseline.   Psychiatric:         Mood and Affect: Mood normal.         Behavior: Behavior normal.               Diagnoses and health risks identified today and associated recommendations/orders:    1. Encounter for preventive health examination  Assessments completed as appropriate.  HM recommendations reviewed. " Mammogram as scheduled.  F/u with PCP as instructed.    2. Severe obesity  Chronic, stable on current regimen. Followed by PCP.    3. Seizure disorder  Chronic, stable on current regimen. Followed by neurology.      Provided Malorie with a 5-10 year written screening schedule and personal prevention plan. Recommendations were developed using the USPSTF age appropriate recommendations. Education, counseling, and referrals were provided as needed. After Visit Summary printed and given to patient which includes a list of additional screenings\tests needed.    Follow up in about 1 year (around 5/25/2023) for Medicare AWV and with PCP as instructed.       Miranda Beltran, SILVIO     I offered to discuss advanced care planning, including how to pick a person who would make decisions for you if you were unable to make them for yourself, called a health care power of , and what kind of decisions you might make such as use of life sustaining treatments such as ventilators and tube feeding when faced with a life limiting illness recorded on a living will that they will need to know. (How you want to be cared for as you near the end of your natural life)     X  Patient is unable to engage in a discussion regarding advanced directives due to severe physical and/or cognitive impairment.

## 2022-06-01 ENCOUNTER — HOSPITAL ENCOUNTER (OUTPATIENT)
Dept: RADIOLOGY | Facility: HOSPITAL | Age: 42
Discharge: HOME | End: 2022-06-01
Attending: FAMILY MEDICINE
Payer: MEDICARE

## 2022-06-01 DIAGNOSIS — Z12.31 SCREENING MAMMOGRAM FOR HIGH-RISK PATIENT: ICD-10-CM

## 2022-06-01 PROCEDURE — 77067 SCR MAMMO BI INCL CAD: CPT | Mod: 26,,, | Performed by: RADIOLOGY

## 2022-06-01 PROCEDURE — 77067 SCR MAMMO BI INCL CAD: CPT | Mod: TC

## 2022-06-01 PROCEDURE — 77063 BREAST TOMOSYNTHESIS BI: CPT | Mod: TC

## 2022-06-01 PROCEDURE — 77063 MAMMO DIGITAL SCREENING BILAT WITH TOMO: ICD-10-PCS | Mod: 26,,, | Performed by: RADIOLOGY

## 2022-06-01 PROCEDURE — 77067 MAMMO DIGITAL SCREENING BILAT WITH TOMO: ICD-10-PCS | Mod: 26,,, | Performed by: RADIOLOGY

## 2022-06-01 PROCEDURE — 77063 BREAST TOMOSYNTHESIS BI: CPT | Mod: 26,,, | Performed by: RADIOLOGY

## 2022-08-22 ENCOUNTER — TELEPHONE (OUTPATIENT)
Dept: PRIMARY CARE CLINIC | Facility: CLINIC | Age: 42
End: 2022-08-22
Payer: MEDICARE

## 2022-08-22 NOTE — TELEPHONE ENCOUNTER
----- Message from Lisa Foster sent at 8/22/2022  2:22 PM CDT -----  Contact: pt mother/ Nelly  [phone]  Patient is special needs and mother MRN [id number]  Nelly Tayolr  has a [facility] care NP visit scheduled for herself on 10/18/22 and would like daughter's appointment on the same day. Please call and advise.    Thank you and have a great day.

## 2022-08-22 NOTE — TELEPHONE ENCOUNTER
Spoke with Nelly, patients appointment is scheduled same day at 1:00.  She verbalized understanding.

## 2022-09-23 ENCOUNTER — TELEPHONE (OUTPATIENT)
Dept: NEUROLOGY | Facility: HOSPITAL | Age: 42
End: 2022-09-23
Payer: MEDICARE

## 2022-09-23 DIAGNOSIS — G40.909 SEIZURE DISORDER: ICD-10-CM

## 2022-09-23 RX ORDER — GABAPENTIN 600 MG/1
1200 TABLET ORAL 3 TIMES DAILY
Qty: 540 TABLET | Refills: 3 | Status: SHIPPED | OUTPATIENT
Start: 2022-09-23 | End: 2023-03-30 | Stop reason: SDUPTHER

## 2022-09-23 RX ORDER — CARBAMAZEPINE 400 MG/1
400 TABLET, EXTENDED RELEASE ORAL 2 TIMES DAILY
Qty: 180 TABLET | Refills: 3 | Status: SHIPPED | OUTPATIENT
Start: 2022-09-23 | End: 2022-10-25

## 2022-09-23 RX ORDER — TOPIRAMATE 200 MG/1
200 TABLET ORAL 2 TIMES DAILY
Qty: 180 TABLET | Refills: 3 | Status: SHIPPED | OUTPATIENT
Start: 2022-09-23 | End: 2022-11-07 | Stop reason: SDUPTHER

## 2022-09-23 NOTE — TELEPHONE ENCOUNTER
Topiramate 200 mg twice daily  Gabapentin 1200 mg 3 times daily  Carbamazepine extended release 400 mg twice daily    Nancy James MD PhD  Neurology-Epilepsy  [facility].        ----- Message from Nancy Hawkins sent at 9/23/2022  1:42 PM CDT -----  Contact: @ [phone]  Pt father is calling to get a refill on the following gabapentin (NEURONTIN) 600 MG tablet ,topiramate (TOPAMAX) 200 MG Tab, and  carBAMazepine (TEGRETOL XR) 400 MG Tb12 please call and adv @ [phone] the father is also looking to get a appointment made for the beginning of the year

## 2022-10-07 ENCOUNTER — OFFICE VISIT (OUTPATIENT)
Dept: PRIMARY CARE CLINIC | Facility: CLINIC | Age: 42
End: 2022-10-07
Payer: MEDICARE

## 2022-10-07 VITALS
HEART RATE: 71 BPM | BODY MASS INDEX: 40.73 KG/M2 | DIASTOLIC BLOOD PRESSURE: 82 MMHG | HEIGHT: 65 IN | WEIGHT: 244.5 LBS | SYSTOLIC BLOOD PRESSURE: 110 MMHG | TEMPERATURE: 99 F | OXYGEN SATURATION: 97 %

## 2022-10-07 DIAGNOSIS — N39.0 URINARY TRACT INFECTION WITH HEMATURIA, SITE UNSPECIFIED: Primary | ICD-10-CM

## 2022-10-07 DIAGNOSIS — R31.9 URINARY TRACT INFECTION WITH HEMATURIA, SITE UNSPECIFIED: Primary | ICD-10-CM

## 2022-10-07 DIAGNOSIS — F41.9 ANXIETY: ICD-10-CM

## 2022-10-07 DIAGNOSIS — R10.9 ABDOMINAL PAIN, UNSPECIFIED ABDOMINAL LOCATION: ICD-10-CM

## 2022-10-07 PROCEDURE — 99214 PR OFFICE/OUTPT VISIT, EST, LEVL IV, 30-39 MIN: ICD-10-PCS | Mod: S$PBB,,, | Performed by: NURSE PRACTITIONER

## 2022-10-07 PROCEDURE — 99999 PR PBB SHADOW E&M-EST. PATIENT-LVL IV: CPT | Mod: PBBFAC,,, | Performed by: NURSE PRACTITIONER

## 2022-10-07 PROCEDURE — 99214 OFFICE O/P EST MOD 30 MIN: CPT | Mod: S$PBB,,, | Performed by: NURSE PRACTITIONER

## 2022-10-07 PROCEDURE — 81003 URINALYSIS AUTO W/O SCOPE: CPT | Performed by: NURSE PRACTITIONER

## 2022-10-07 PROCEDURE — 99214 OFFICE O/P EST MOD 30 MIN: CPT | Mod: PBBFAC,PN | Performed by: NURSE PRACTITIONER

## 2022-10-07 PROCEDURE — 99999 PR PBB SHADOW E&M-EST. PATIENT-LVL IV: ICD-10-PCS | Mod: PBBFAC,,, | Performed by: NURSE PRACTITIONER

## 2022-10-07 RX ORDER — NITROFURANTOIN 25; 75 MG/1; MG/1
100 CAPSULE ORAL 2 TIMES DAILY
Qty: 10 CAPSULE | Refills: 0 | Status: SHIPPED | OUTPATIENT
Start: 2022-10-07 | End: 2022-11-07

## 2022-10-07 RX ORDER — ALPRAZOLAM 0.25 MG/1
0.25 TABLET ORAL
Qty: 1 TABLET | Refills: 0 | Status: SHIPPED | OUTPATIENT
Start: 2022-10-07 | End: 2023-02-20 | Stop reason: SDUPTHER

## 2022-10-07 NOTE — PROGRESS NOTES
[facility] Note    Chief Complaint    Pain and blood on urination, abdominal pain    History of Present Illness      Malorie Taylor is a 42 y.o. female who presents today for pain and blood on urination for the last 2 months. She reports abdominal pain but isn't sure if it's because she picks up heavy things at work or not. She is not current on her vaginal exam/ PAP as she was very anxious during  last exam making it impossible to do a full vaginal exam and retrieve a specimen, per her mother's information. She denies any SOB, chest pain, N/V, unintentional weight loss, loss of appetite, fatigue, diarrhea, constipation. She is active daily and remains independent with ADL's.     Problem List Items Addressed This Visit    None  Visit Diagnoses       Urinary tract infection with hematuria, site unspecified    -  Primary    Relevant Medications    nitrofurantoin, macrocrystal-monohydrate, (MACROBID) 100 MG capsule    Other Relevant Orders    Urinalysis, Reflex to Urine Culture Urine, Clean Catch    Anxiety        Relevant Medications    ALPRAZolam (XANAX) 0.25 MG tablet    Abdominal pain, unspecified abdominal location        Relevant Orders    US Abdomen Complete            Review of Systems   Constitutional: Negative.    HENT: Negative.     Eyes: Negative.    Respiratory: Negative.     Cardiovascular: Negative.    Gastrointestinal: Negative.    Genitourinary:  Positive for dysuria and hematuria.   Musculoskeletal: Negative.    Skin: Negative.    Neurological: Negative.    Endo/Heme/Allergies: Negative.    Psychiatric/Behavioral: Negative.        Past Medical History:  Past Medical History:   Diagnosis Date    Allergy     Obesity 10/31/2015    Seizures     epilepsy       Past Surgical History:  History reviewed. No pertinent surgical history.    Family History:  family history includes Heart disease in her father; No Known Problems in her brother, brother, mother, and sister.   Family history was  reviewed with patient.     Social History:  Social History     Socioeconomic History    Marital status: Single   Occupational History    Occupation: Housekeeping   Tobacco Use    Smoking status: Never    Smokeless tobacco: Never   Substance and Sexual Activity    Alcohol use: Never    Drug use: Never    Sexual activity: Never     Social Determinants of Health     Financial Resource Strain: Low Risk     Difficulty of Paying Living Expenses: Not hard at all   Food Insecurity: No Food Insecurity    Worried About Running Out of Food in the Last Year: Never true    Ran Out of Food in the Last Year: Never true   Transportation Needs: No Transportation Needs    Lack of Transportation (Medical): No    Lack of Transportation (Non-Medical): No   Physical Activity: Insufficiently Active    Days of Exercise per Week: 3 days    Minutes of Exercise per Session: 10 min   Stress: No Stress Concern Present    Feeling of Stress : Not at all   Social Connections: Moderately Isolated    Frequency of Communication with Friends and Family: More than three times a week    Frequency of Social Gatherings with Friends and Family: More than three times a week    Attends Sabianism Services: More than 4 times per year    Active Member of Clubs or Organizations: No    Attends Club or Organization Meetings: Never    Marital Status: Never    Housing Stability: Low Risk     Unable to Pay for Housing in the Last Year: No    Number of Places Lived in the Last Year: 1    Unstable Housing in the Last Year: No         Medications:  Outpatient Encounter Medications as of 10/7/2022   Medication Sig Dispense Refill    carBAMazepine (TEGRETOL XR) 400 MG Tb12 Take 1 tablet (400 mg total) by mouth 2 (two) times daily. 180 tablet 3    clotrimazole (LOTRIMIN) 1 % cream Apply topically 2 (two) times daily. 60 g 1    ferrous gluconate (FERGON) 325 MG Tab Take 1 tablet (325 mg total) by mouth daily with breakfast.  0    fish oil-omega-3 fatty acids  "300-1,000 mg capsule Take 1 g by mouth 2 (two) times daily.      flu vacc [id number] 6mos up,PF, (FLUARIX QUAD [id number], PF,) 60 mcg (15 mcg x 4)/0.5 mL Syrg Inject in the muscle 0.5 mL 0    gabapentin (NEURONTIN) 600 MG tablet Take 2 tablets (1,200 mg total) by mouth 3 (three) times daily. 540 tablet 3    hydrocortisone 2.5 % cream Apply topically 2 (two) times daily. 60 g 1    multivitamin capsule Take 1 capsule by mouth once daily.      nystatin (MYCOSTATIN) powder Apply topically 2 (two) times daily. 120 g 4    topiramate (TOPAMAX) 200 MG Tab Take 1 tablet (200 mg total) by mouth 2 (two) times daily. 180 tablet 3    vitamin D 1000 units Tab Take 185 mg by mouth once daily.      ALPRAZolam (XANAX) 0.25 MG tablet Take 1 tablet (0.25 mg total) by mouth On call Procedure for Anxiety (prior to GYN exam). 1 tablet 0    nitrofurantoin, macrocrystal-monohydrate, (MACROBID) 100 MG capsule Take 1 capsule (100 mg total) by mouth 2 (two) times daily. 10 capsule 0     No facility-administered encounter medications on file as of 10/7/2022.       Allergies:  Review of patient's allergies indicates:   Allergen Reactions    Phenobarbital      lethargy       Health Maintenance:  Health Maintenance   Topic Date Due    Mammogram  06/01/2023    TETANUS VACCINE  02/01/2028    Hepatitis C Screening  Completed    Lipid Panel  Completed     Health Maintenance Topics with due status: Not Due       Topic Last Completion Date    TETANUS VACCINE 02/01/2018    Cervical Cancer Screening 03/17/2021    Mammogram 06/01/2022       Physical Exam      Vital Signs  Temp: 98.8 °F (37.1 °C)  Pulse: 71  SpO2: 97 %  BP: 110/82  BP Location: Right arm  Pain Score:   2  Height and Weight  Height: 5' 5" (165.1 cm)  Weight: 110.9 kg (244 lb 7.8 oz)  BSA (Calculated - sq m): 2.26 sq meters  BMI (Calculated): 40.7  Weight in (lb) to have BMI = 25: 149.9]    Physical Exam  Vitals reviewed.   Constitutional:       Appearance: Normal appearance. She is obese. "   HENT:      Head: Normocephalic and atraumatic.      Nose: Nose normal.      Mouth/Throat:      Mouth: Mucous membranes are moist.      Pharynx: Oropharynx is clear.   Eyes:      Extraocular Movements: Extraocular movements intact.      Conjunctiva/sclera: Conjunctivae normal.      Pupils: Pupils are equal, round, and reactive to light.   Cardiovascular:      Rate and Rhythm: Normal rate and regular rhythm.   Pulmonary:      Effort: Pulmonary effort is normal.      Breath sounds: Normal breath sounds.   Abdominal:      General: Abdomen is flat. Bowel sounds are normal.      Palpations: Abdomen is soft.   Musculoskeletal:         General: Normal range of motion.      Cervical back: Normal range of motion and neck supple.   Skin:     General: Skin is warm and dry.      Capillary Refill: Capillary refill takes less than 2 seconds.   Neurological:      General: No focal deficit present.      Mental Status: She is alert and oriented to person, place, and time. Mental status is at baseline.   Psychiatric:         Mood and Affect: Mood normal.         Behavior: Behavior normal.         Thought Content: Thought content normal.         Judgment: Judgment normal.        Laboratory:  CBC:  Recent Labs   Lab 02/14/20  0702 03/17/21  0810 03/02/22  0700   WBC 4.61 5.72 6.03   RBC 3.52 L 3.54 L 3.47 L   Hemoglobin 12.2 12.4 12.1   Hematocrit 37.5 37.0 37.0   Platelets 258 272 259    H 105 H 107 H   MCH 34.7 H 35.0 H 34.9 H   MCHC 32.5 33.5 32.7     CMP:  Recent Labs   Lab 02/14/20  0702 03/17/21  0810 03/02/22  0700   Glucose 88 83 88   Calcium 8.8 8.9 8.8   Albumin 3.7 3.8 3.5   Total Protein 7.5 7.9 7.3   Sodium 135 L 140 136   Potassium 3.7 4.0 4.0   CO2 23 23 23   Chloride 105 109 107   BUN 8 11 10   Alkaline Phosphatase 99 105 101   ALT 14 21 14   AST 19 19 16   Total Bilirubin 0.3 0.4 0.3     URINALYSIS:  Recent Labs   Lab 10/14/19  1550 02/19/20  1413 07/17/20  1514   Color, UA Sho   < > Yellow   Clarity, UA  --     < > Slightly Cloudy   Specific Stollings, UA 1.015  --   --    Spec Grav UA  --    < > 1.010   pH, UA 7.0   < > 7   Protein, UA Negative  --   --    Bacteria Moderate A  --   --    Nitrite, UA Positive A   < > neg   Leukocytes, UA 2+ A  --   --    Urobilinogen, UA  --    < > normal    < > = values in this interval not displayed.      LIPIDS:  Recent Labs   Lab 02/14/20  0702 03/17/21  0810 03/02/22  0700   TSH 1.643 1.865 3.062   HDL 60 56 58   Cholesterol 209 H 216 H 222 H   Triglycerides 71 92 88   LDL Cholesterol 134.8 141.6 146.4   HDL/Cholesterol Ratio 28.7 25.9 26.1   Non-HDL Cholesterol 149 160 164   Total Cholesterol/HDL Ratio 3.5 3.9 3.8     TSH:  Recent Labs   Lab 02/14/20 0702 03/17/21  0810 03/02/22  0700   TSH 1.643 1.865 3.062     A1C:        Radiology:        Assessment/Plan     Malorie Taylor is a 42 y.o.female with:    Urinary tract infection with hematuria, site unspecified  -     Urinalysis, Reflex to Urine Culture Urine, Clean Catch  -     nitrofurantoin, macrocrystal-monohydrate, (MACROBID) 100 MG capsule; Take 1 capsule (100 mg total) by mouth 2 (two) times daily.  Dispense: 10 capsule; Refill: 0    Anxiety  -     ALPRAZolam (XANAX) 0.25 MG tablet; Take 1 tablet (0.25 mg total) by mouth On call Procedure for Anxiety (prior to GYN exam).  Dispense: 1 tablet; Refill: 0    Abdominal pain, unspecified abdominal location  -     US Abdomen Complete; Future; Expected date: 10/07/2022      As above, continue current medications and maintain follow up with specialists.  Return to clinic as needed.    I spent 30 minutes on the day of this encounter for preparing, evaluating, examining, treating, and discussing plan of care with this patient.  Greater than 50% of this time was spent face to face with patient.  All questions were answered to patient's satisfaction.         Karen L Spencer, NP-C [facility]      Addendum:  10/10/22, 07:50  I spoke to patient's father via telephone and  explained Malorie does not have a UTI according to her urinalysis result. Therefore she can stop taking antibiotics. I reinforced need to visit gynecologist for intermittent vaginal bleeding. Her father verbalizes understanding.

## 2022-10-08 LAB
BILIRUB UR QL STRIP: NEGATIVE
CLARITY UR REFRACT.AUTO: ABNORMAL
COLOR UR AUTO: YELLOW
GLUCOSE UR QL STRIP: NEGATIVE
HGB UR QL STRIP: ABNORMAL
KETONES UR QL STRIP: NEGATIVE
LEUKOCYTE ESTERASE UR QL STRIP: NEGATIVE
NITRITE UR QL STRIP: NEGATIVE
PH UR STRIP: 6 [PH] (ref 5–8)
PROT UR QL STRIP: NEGATIVE
SP GR UR STRIP: 1.01 (ref 1–1.03)
URN SPEC COLLECT METH UR: ABNORMAL

## 2022-10-21 ENCOUNTER — HOSPITAL ENCOUNTER (OUTPATIENT)
Dept: RADIOLOGY | Facility: HOSPITAL | Age: 42
Discharge: HOME | End: 2022-10-21
Attending: NURSE PRACTITIONER
Payer: MEDICARE

## 2022-10-21 DIAGNOSIS — N28.89 OTHER SPECIFIED DISORDERS OF KIDNEY AND URETER: ICD-10-CM

## 2022-10-21 DIAGNOSIS — R10.9 ABDOMINAL PAIN, UNSPECIFIED ABDOMINAL LOCATION: Primary | ICD-10-CM

## 2022-10-21 DIAGNOSIS — R10.9 ABDOMINAL PAIN, UNSPECIFIED ABDOMINAL LOCATION: ICD-10-CM

## 2022-10-21 DIAGNOSIS — Q63.1: ICD-10-CM

## 2022-10-21 DIAGNOSIS — K76.0 FATTY LIVER: ICD-10-CM

## 2022-10-21 PROCEDURE — 76700 US EXAM ABDOM COMPLETE: CPT | Mod: 26,,, | Performed by: STUDENT IN AN ORGANIZED HEALTH CARE EDUCATION/TRAINING PROGRAM

## 2022-10-21 PROCEDURE — 76700 US EXAM ABDOM COMPLETE: CPT | Mod: TC

## 2022-10-21 PROCEDURE — 76700 US ABDOMEN COMPLETE: ICD-10-PCS | Mod: 26,,, | Performed by: STUDENT IN AN ORGANIZED HEALTH CARE EDUCATION/TRAINING PROGRAM

## 2022-10-25 DIAGNOSIS — G40.909 SEIZURE DISORDER: ICD-10-CM

## 2022-10-25 RX ORDER — CARBAMAZEPINE 400 MG/1
TABLET, EXTENDED RELEASE ORAL
Qty: 180 TABLET | Refills: 3 | Status: SHIPPED | OUTPATIENT
Start: 2022-10-25 | End: 2023-10-13 | Stop reason: SDUPTHER

## 2022-11-05 ENCOUNTER — HOSPITAL ENCOUNTER (OUTPATIENT)
Dept: RADIOLOGY | Facility: HOSPITAL | Age: 42
Discharge: HOME | End: 2022-11-05
Attending: NURSE PRACTITIONER
Payer: MEDICARE

## 2022-11-05 DIAGNOSIS — R10.9 ABDOMINAL PAIN, UNSPECIFIED ABDOMINAL LOCATION: ICD-10-CM

## 2022-11-05 DIAGNOSIS — Q63.1: ICD-10-CM

## 2022-11-05 DIAGNOSIS — N28.89 OTHER SPECIFIED DISORDERS OF KIDNEY AND URETER: ICD-10-CM

## 2022-11-05 PROCEDURE — 25500020 PHARM REV CODE 255: Performed by: NURSE PRACTITIONER

## 2022-11-05 PROCEDURE — 74170 CT ABD WO CNTRST FLWD CNTRST: CPT | Mod: TC

## 2022-11-05 PROCEDURE — 74170 CT ABD WO CNTRST FLWD CNTRST: CPT | Mod: 26,,, | Performed by: RADIOLOGY

## 2022-11-05 PROCEDURE — 74170 CT ABDOMEN W WO CONTRAST: ICD-10-PCS | Mod: 26,,, | Performed by: RADIOLOGY

## 2022-11-05 RX ADMIN — IOHEXOL 100 ML: 350 INJECTION, SOLUTION INTRAVENOUS at 08:11

## 2022-11-07 ENCOUNTER — OFFICE VISIT (OUTPATIENT)
Dept: UROLOGY | Facility: CLINIC | Age: 42
End: 2022-11-07
Payer: MEDICARE

## 2022-11-07 ENCOUNTER — TELEPHONE (OUTPATIENT)
Dept: PRIMARY CARE CLINIC | Facility: CLINIC | Age: 42
End: 2022-11-07
Payer: MEDICARE

## 2022-11-07 ENCOUNTER — LAB VISIT (OUTPATIENT)
Dept: LAB | Facility: HOSPITAL | Age: 42
End: 2022-11-07
Payer: MEDICARE

## 2022-11-07 ENCOUNTER — OFFICE VISIT (OUTPATIENT)
Dept: NEUROLOGY | Facility: CLINIC | Age: 42
End: 2022-11-07
Payer: MEDICARE

## 2022-11-07 VITALS
HEART RATE: 62 BPM | SYSTOLIC BLOOD PRESSURE: 133 MMHG | HEIGHT: 65 IN | DIASTOLIC BLOOD PRESSURE: 92 MMHG | WEIGHT: 241.5 LBS | BODY MASS INDEX: 40.24 KG/M2

## 2022-11-07 VITALS
HEIGHT: 65 IN | RESPIRATION RATE: 16 BRPM | DIASTOLIC BLOOD PRESSURE: 68 MMHG | SYSTOLIC BLOOD PRESSURE: 106 MMHG | BODY MASS INDEX: 40.28 KG/M2 | WEIGHT: 241.75 LBS | OXYGEN SATURATION: 99 % | HEART RATE: 68 BPM

## 2022-11-07 DIAGNOSIS — G40.909 SEIZURE DISORDER: ICD-10-CM

## 2022-11-07 DIAGNOSIS — N28.89 LEFT RENAL MASS: ICD-10-CM

## 2022-11-07 DIAGNOSIS — N28.89 RENAL MASS: Primary | ICD-10-CM

## 2022-11-07 DIAGNOSIS — G40.909 SEIZURE DISORDER: Primary | ICD-10-CM

## 2022-11-07 LAB
CARBAMAZEPINE SERPL-MCNC: 8.3 UG/ML (ref 4–12)
MICROSCOPIC COMMENT: ABNORMAL
RBC #/AREA URNS HPF: 3 /HPF (ref 0–4)
SQUAMOUS #/AREA URNS HPF: 10 /HPF
WBC #/AREA URNS HPF: 20 /HPF (ref 0–5)

## 2022-11-07 PROCEDURE — 99999 PR PBB SHADOW E&M-EST. PATIENT-LVL III: CPT | Mod: PBBFAC,,,

## 2022-11-07 PROCEDURE — 99999 PR PBB SHADOW E&M-EST. PATIENT-LVL V: CPT | Mod: PBBFAC,,, | Performed by: NURSE PRACTITIONER

## 2022-11-07 PROCEDURE — 99215 OFFICE O/P EST HI 40 MIN: CPT | Mod: PBBFAC,27,PN | Performed by: NURSE PRACTITIONER

## 2022-11-07 PROCEDURE — 81001 URINALYSIS AUTO W/SCOPE: CPT | Performed by: NURSE PRACTITIONER

## 2022-11-07 PROCEDURE — 99999 PR PBB SHADOW E&M-EST. PATIENT-LVL III: ICD-10-PCS | Mod: PBBFAC,,,

## 2022-11-07 PROCEDURE — 99214 PR OFFICE/OUTPT VISIT, EST, LEVL IV, 30-39 MIN: ICD-10-PCS | Mod: S$PBB,ICN,, | Performed by: NURSE PRACTITIONER

## 2022-11-07 PROCEDURE — 99215 PR OFFICE/OUTPT VISIT, EST, LEVL V, 40-54 MIN: ICD-10-PCS | Mod: S$PBB,,,

## 2022-11-07 PROCEDURE — 87086 URINE CULTURE/COLONY COUNT: CPT | Performed by: NURSE PRACTITIONER

## 2022-11-07 PROCEDURE — 99214 OFFICE O/P EST MOD 30 MIN: CPT | Mod: S$PBB,ICN,, | Performed by: NURSE PRACTITIONER

## 2022-11-07 PROCEDURE — 99215 OFFICE O/P EST HI 40 MIN: CPT | Mod: S$PBB,,,

## 2022-11-07 PROCEDURE — 99213 OFFICE O/P EST LOW 20 MIN: CPT | Mod: PBBFAC

## 2022-11-07 PROCEDURE — 99999 PR PBB SHADOW E&M-EST. PATIENT-LVL V: ICD-10-PCS | Mod: PBBFAC,,, | Performed by: NURSE PRACTITIONER

## 2022-11-07 PROCEDURE — 80156 ASSAY CARBAMAZEPINE TOTAL: CPT

## 2022-11-07 RX ORDER — FOLIC ACID 0.4 MG
400 TABLET ORAL DAILY
COMMUNITY

## 2022-11-07 RX ORDER — VITAMIN B COMPLEX
1 CAPSULE ORAL
COMMUNITY

## 2022-11-07 RX ORDER — TOPIRAMATE 200 MG/1
200 TABLET ORAL 2 TIMES DAILY
Qty: 180 TABLET | Refills: 3 | Status: SHIPPED | OUTPATIENT
Start: 2022-11-07 | End: 2023-10-13 | Stop reason: SDUPTHER

## 2022-11-07 NOTE — PROGRESS NOTES
Name: Malorie Taylor  MRN:[id number]   CSN: [id number]  Date of service: 2021  Age:42 y.o.   Gender:female   Referring Physician/Service: No referring provider defined for this encounter.   The patient is here today with: Mom    Neurology Clinic: Follow-up Visit    CHIEF COMPLAINT:  Epilepsy    Interval Events/ROS 2022:    Accompanied by mom who provides majority of the history.     Current ASM/SEs: topiramate 200 mg twice daily, carbamazepine 400 mg twice daily, and gabapentin 1200 mg 3 times daily; no SE  Breakthrough seizures/events: no seizures in years   Driving: no  Folic acid: yes  Sleep: good  Mood: good    Otherwise, no fever, no cold symptoms, no headache, no changes in vision, no new weakness, no chest pain, no shortness of breath, no nausea, no vomiting, no diarrhea, no constipation, no tingling/numbness, no problems walking.    HPI 2021:     Age of first seizure: 8yo  Seizure Risk Factors:  Paternal grandmother's twin sister with seizures, Special Education courses, full-term  because of prior  with no prolonged hospitalization, no CNS infections, no head strike with LOC  Time of Last Seizure:   # of lifetime Seizures: maybe 5-10 convulsions, several hundred staring episodes   Frequency of Seizures: has had as many as 100 staring seizures in a night, almost daily at the worse, currently, on these meds, none for years   Seizure Triggers: changes in medication  Injuries/Hospitalization for seizures? No injuries, no hospitalizations just for seizures   Pregnancy? No   Contraception? No   Folic Acid? Yes in a daily vitamin   Bone Health: no Dexa      Auras: no warning     Seizure Events:   1. Staring, eyes open, decreased responsiveness, some confusion, lasts a few seconds, followed by a head nod, will not remember these, usually goes back to what she is doing    2. Generalized convulsion    Current AED/SEs:  1. Topiramate 200 mg twice daily SE none   2. Carbamazepine  "400 mg twice daily SE none   3. Gabapentin 1200 mg 3 times daily SE none     Previous AED/SEs or reason for DC.   1. Phenobarbital (allergic reaction) - lethargy   2. Attempted other medications, unclear which ones    EEG: none recently, had these in the past, results unclear     MRI: no head imaging in the system     AED compliance, adherence: no missed doses. pill minder       ROS 11/17/2021:PCP [place]. 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 Treadmill with some walking. Otherwise, denies headache, loss of vision, blurred vision, diplopia, dysarthria, dysphagia, lightheadedness, vertigo, tinnitus or hearing difficulty. Denies difficulties producing or comprehending speech.  Denies focal weakness, numbness, paresthesias. Denies difficulty with gait. Denies cough, shortness of breath.  Denies chest pain or tightness, palpitations.  Denies nausea, vomiting, diarrhea, constipation or abdominal pain.  No bowel or bladder incontinence or retention.  No falls.       EXAM:   - Vitals: BP (!) 133/92   Pulse 62   Ht 5' 5" (1.651 m)   Wt 109.5 kg (241 lb 8.2 oz)   BMI 40.19 kg/m²    - General: Awake, cooperative, NAD.  - HEENT: NC/AT  - Neck:  Decreased range of motion  - Pulmonary: no increased WOB  - Cardiac: well perfused   - Abdomen: soft, nontender, BMI  - Extremities: no edema  - Skin: no rashes or lesions noted.     NEURO EXAM:   - Mental Status: Awake, alert, oriented x 3. Dad provides the entire history.  Remains quiet for the majority of history and exam.  But will answer appropriately with single words and simple phrases. There were no paraphasic errors. Able to name both high frequency objects. Speech was not dysarthric. Able to follow simple commands errors on cross-body commands.      - Cranial Nerves:  VFF to confrontation. EOMI. No facial droop. Hearing intact to room voice. 5/5 strength in trapezii and SCM bilaterally. Tongue protrudes in midline and to either side with no evidence of atrophy " or weakness.    - Motor: Normal bulk and tone throughout. No pronator drift bilaterally. No adventitious movements such as tremor or asterixis noted.     Delt Bic Tri WrE WrF  FFl FE IO IP Quad Ham TA Gastroc    R   5     5    5    5    5        5   5    5   5    5        5     5      5          L   5      5    5   5    5        5    5   5    5    5       5     5      5            - Sensory: No deficits to light touch. No extinction to DSS.  - Coordination:  Mild tremor on FNF but hits target.    - Gait: Good initiation.  Wide-based. Romberg negative.    PLAN:  42-year-old woman with cryptogenic epilepsy since childhood well controlled on topiramate 200 mg twice daily, carbamazepine 400 mg twice daily, and gabapentin 1200 mg 3 times daily. Annual levels.  Follow up in about 1 year (around 11/7/2023).     Patient Instructions   You came to Epilepsy Clinic because of your seizure disorder.  Your seizures are well controlled on topiramate 200 mg twice daily, carbamazepine 400 mg twice daily, and gabapentin 1200 mg 3 times daily. Please continue the same medications at the same dose.     Do not miss any doses of medication. If a dose of medication is missed, take it as soon as it is remembered even if that means doubling up on the dose. Please get a lab test to check out the blood level of medication. Take folic acid 1mg daily. Get regular sleep. Go to sleep at the same time and wake up at the same time every day. People with epilepsy require more sleep than people without epilepsy.  Sleeping 10-12 hours a day can be normal for a person with epilepsy.      Per [place] law, no episodes of loss of consciousness for 6 months before driving.  Avoid dangerous situations.  For example, no baths/pools alone, no heights, no power tools.  Wear a bike helmet.  If breakthrough seizures occur that are different in character, frequency, or duration from normal episodes, please patient portal me or call the office and we will  decide the next steps. If multiple seizures occur in a row without return back to baseline, 911 needs to be called.     Return to clinic in one year or sooner with issues.  Please patient portal with any questions or concerns.    Kimberlee Fierro PA-C   Neurology-Epilepsy  [facility]     Problem List Items Addressed This Visit          Neuro    Seizure disorder    Relevant Medications    topiramate (TOPAMAX) 200 MG Tab    Other Relevant Orders    Topiramate level    Carbamazepine level, total     Disclaimer: This note has been generated using voice-recognition software. There may be typographical errors that were missed during proof-reading.     LABS:  Recent Labs   Lab 02/14/20  0702 03/17/21  0810 03/02/22  0700   WBC 4.61 5.72 6.03   Hemoglobin 12.2 12.4 12.1   Hematocrit 37.5 37.0 37.0   Platelets 258 272 259   Sodium 135 L 140 136   Potassium 3.7 4.0 4.0   BUN 8 11 10   Creatinine 0.8 0.7 0.7   eGFR if African American >60.0 >60.0 >60.0   eGFR if non African American >60.0 >60.0 >60.0   TSH 1.643 1.865 3.062              IMAGING:  Recent imaging is personally reviewed with the patient.    None in the system    PAST MEDICAL HISTORY:   Active Ambulatory Problems     Diagnosis Date Noted    Seizure disorder 08/10/2012    Kyphosis 08/26/2013    Severe obesity 10/31/2015    Fatty liver 10/21/2022     Resolved Ambulatory Problems     Diagnosis Date Noted    Mental retardation 08/26/2013     Past Medical History:   Diagnosis Date    Allergy     Obesity 10/31/2015    Seizures         PAST SURGICAL HISTORY: No past surgical history on file.     ALLERGIES: Phenobarbital   CURRENT MEDICATIONS:   Current Outpatient Medications   Medication Sig Dispense Refill    ALPRAZolam (XANAX) 0.25 MG tablet Take 1 tablet (0.25 mg total) by mouth On call Procedure for Anxiety (prior to GYN exam). 1 tablet 0    carBAMazepine (TEGRETOL XR) 400 MG Tb12 TAKE 1 TABLET(400 MG) BY MOUTH TWICE DAILY 180 tablet 3     clotrimazole (LOTRIMIN) 1 % cream Apply topically 2 (two) times daily. 60 g 1    ferrous gluconate (FERGON) 325 MG Tab Take 1 tablet (325 mg total) by mouth daily with breakfast.  0    fish oil-omega-3 fatty acids 300-1,000 mg capsule Take 1 g by mouth 2 (two) times daily.      flu vacc [id number] 6mos up,PF, (FLUARIX QUAD [id number], PF,) 60 mcg (15 mcg x 4)/0.5 mL Syrg Inject in the muscle 0.5 mL 0    gabapentin (NEURONTIN) 600 MG tablet Take 2 tablets (1,200 mg total) by mouth 3 (three) times daily. 540 tablet 3    hydrocortisone 2.5 % cream Apply topically 2 (two) times daily. 60 g 1    multivitamin capsule Take 1 capsule by mouth once daily.      nitrofurantoin, macrocrystal-monohydrate, (MACROBID) 100 MG capsule Take 1 capsule (100 mg total) by mouth 2 (two) times daily. 10 capsule 0    nystatin (MYCOSTATIN) powder Apply topically 2 (two) times daily. 120 g 4    topiramate (TOPAMAX) 200 MG Tab Take 1 tablet (200 mg total) by mouth 2 (two) times daily. 180 tablet 3    vitamin D 1000 units Tab Take 185 mg by mouth once daily.       No current facility-administered medications for this visit.        FAMILY HISTORY:   Family History   Problem Relation Age of Onset    No Known Problems Mother     Heart disease Father     No Known Problems Sister     No Known Problems Brother     No Known Problems Brother     Acne Neg Hx     Eczema Neg Hx     Lupus Neg Hx     Psoriasis Neg Hx     Melanoma Neg Hx     Breast cancer Neg Hx     Colon cancer Neg Hx     Ovarian cancer Neg Hx          SOCIAL HISTORY:   Social History     Socioeconomic History    Marital status: Single   Occupational History    Occupation: Housekeeping   Tobacco Use    Smoking status: Never    Smokeless tobacco: Never   Substance and Sexual Activity    Alcohol use: Never    Drug use: Never    Sexual activity: Never     Social Determinants of Health     Financial Resource Strain: Low Risk     Difficulty of Paying Living Expenses: Not hard at all   Food  Insecurity: No Food Insecurity    Worried About Running Out of Food in the Last Year: Never true    Ran Out of Food in the Last Year: Never true   Transportation Needs: No Transportation Needs    Lack of Transportation (Medical): No    Lack of Transportation (Non-Medical): No   Physical Activity: Insufficiently Active    Days of Exercise per Week: 3 days    Minutes of Exercise per Session: 10 min   Stress: No Stress Concern Present    Feeling of Stress : Not at all   Social Connections: Moderately Isolated    Frequency of Communication with Friends and Family: More than three times a week    Frequency of Social Gatherings with Friends and Family: More than three times a week    Attends Oriental orthodox Services: More than 4 times per year    Active Member of Clubs or Organizations: No    Attends Club or Organization Meetings: Never    Marital Status: Never    Housing Stability: Low Risk     Unable to Pay for Housing in the Last Year: No    Number of Places Lived in the Last Year: 1    Unstable Housing in the Last Year: No      Questions and concerns raised by the patient and family/care-giver(s) were addressed and they indicated understanding of everything discussed and agreed to plans as above.    Kimberlee Fierro PA-C   Neurology-Epilepsy  [facility]    Collaborating physician, Dr. Nancy James, was available during today's encounter.     I spent approximately 40 minutes on the day of this encounter preparing to see the patient, obtaining and reviewing history and results, performing a medically appropriate exam, counseling and educating the patient/family/caregiver, documenting clinical information, coordinating care, and ordering medications, tests, procedures, and referrals.

## 2022-11-07 NOTE — TELEPHONE ENCOUNTER
----- Message from Jeannine Marie sent at 11/7/2022 10:33 AM CST -----  Contact: pt's dad Dani [phone]  Dani is calling back to be advised on if pt needs to keep her Urology appt for today. Per Dani, he would like a call back as soon as possible in regards to this. An appt was scheduled for this afternoon for 2pm. They would like to go over a few things to decide if the appt is needed. Please call.          Thank you

## 2022-11-07 NOTE — TELEPHONE ENCOUNTER
Pt's dad was confirming Urology appt and asking if the urology doctor will be able to see the ultrasound report

## 2022-11-07 NOTE — PATIENT INSTRUCTIONS
You came to Epilepsy Clinic because of your seizure disorder.  Your seizures are well controlled on topiramate 200 mg twice daily, carbamazepine 400 mg twice daily, and gabapentin 1200 mg 3 times daily. Please continue the same medications at the same dose.     Do not miss any doses of medication. If a dose of medication is missed, take it as soon as it is remembered even if that means doubling up on the dose. Please get a lab test to check out the blood level of medication. Take folic acid 1mg daily. Get regular sleep. Go to sleep at the same time and wake up at the same time every day. People with epilepsy require more sleep than people without epilepsy.  Sleeping 10-12 hours a day can be normal for a person with epilepsy.      Per [place] law, no episodes of loss of consciousness for 6 months before driving.  Avoid dangerous situations.  For example, no baths/pools alone, no heights, no power tools.  Wear a bike helmet.  If breakthrough seizures occur that are different in character, frequency, or duration from normal episodes, please patient portal me or call the office and we will decide the next steps. If multiple seizures occur in a row without return back to baseline, 911 needs to be called.     Return to clinic in one year or sooner with issues.  Please patient portal with any questions or concerns.    Kimberlee Fierro PA-C   Neurology-Epilepsy  [facility]

## 2022-11-07 NOTE — PROGRESS NOTES
"Subjective:      Malorie Taylor is a 42 y.o. female who presents for evaluation of left renal mass.      RBUS (10/21) revealed incidental ovoid isoechoic structure arising from the left kidney, unable to r/o solid mass. CT ABD w/contrast (11/5) revealed 4.2 x 4 x 3.8 cm solid mass in the mid left kidney along the anteromedial surface. She presents today with her mother, Emily, who provides most of her history. Denies flank pain, GH, f/c/n/v.   No family history of RCC  No renal stone history   No previous ABD surgeries   Hx of seizure disorder; currently on tegretol and Topamax.   Not on blood thinners      The following portions of the patient's history were reviewed and updated as appropriate: allergies, current medications, past family history, past medical history, past social history, past surgical history and problem list.    Review of Systems   Constitutional: no fever or chills  ENT: no nasal congestion or sore throat  Respiratory: no cough or shortness of breath  Cardiovascular: no chest pain or palpitations  Gastrointestinal: no nausea or vomiting, tolerating diet  Genitourinary: as per HPI  Hematologic/Lymphatic: no easy bruising or lymphadenopathy  Musculoskeletal: no arthralgias or myalgias  Neurological: positive for seizures  Behavioral/Psych: no auditory or visual hallucinations     Objective:   Vital Signs:/68 (BP Location: Right arm, Patient Position: Sitting, BP Method: Large (Automatic))   Pulse 68   Resp 16   Ht 5' 5" (1.651 m)   Wt 109.6 kg (241 lb 11.8 oz)   SpO2 99%   BMI 40.23 kg/m²     Physical Exam   General: no acute distress  Head: normocephalic, atraumatic  Neck: supple, no lymphadenopathy, normal ROM, no masses  Respiratory: Symmetric expansion, non-labored breathing  Cardiovascular: no peripheral edema  Abdomen: soft, non tender, obese   Skin: no suspicious skin lesions noted  Neuro: alert; quiet   Psych: non-anxious    Lab Review   Urinalysis demonstrates no specimen "   Lab Results   Component Value Date    WBC 6.03 03/02/2022    HGB 12.1 03/02/2022    HCT 37.0 03/02/2022     (H) 03/02/2022     03/02/2022     Lab Results   Component Value Date    CREATININE 0.7 03/02/2022    BUN 10 03/02/2022       Imaging   CT ABDOMEN W WO CONTRAST     CLINICAL HISTORY:  Renal mass;Abdominal pain, acute, nonlocalized;     TECHNIQUE:  Routine axial CT images of the abdomen were obtained before and after administration 100cc of IV Omnipaque 350.  Oral contrast with water was also utilized..  Coronal and Sagittal reformatted images were also obtained.     COMPARISON:  Abdominal ultrasound October 21, 2022; CT of the abdomen and pelvis May 29, 2019     FINDINGS:  Heart: Normal in Size.  No pericardial effusion.     Lungs: Numerous pulmonary nodules at the right lung base are better visualized on the current examination than previously; the largest is at the right lung base medially and measures 1.2 cm, not significantly changed.  Apparent increase in number of nodules may relate to differences in technique between the previous CT and the current examination.  Although for multiple solid nodules with any 6 mm or greater, Fleischner Society guidelines recommend follow up with non-contrast chest CT at 3-6 months and 18-24 months after discovery. The patient has not had a complete CT of chest to fully characterize the number, size, and extent of these nodules, however.  Follow-up recommendations cannot be issued based on the currently available examinations.     Liver: Mildly enlarged, 20.3 cm.  No significant change in the 1.2 cm hypodense lesion at the dome of the liver.  No new focal hepatic abnormalities.     Gallbladder: No calcified gallstones.     Bile ducts: No evidence of dilated ducts.     Pancreas: No mass or peripancreatic fat stranding.     Spleen: Upper normal in size, 11.6 cm.  No focal lesions.     Adrenals: Normal.     GI Tract/ Mesentery: No evidence of bowel obstruction  or inflammation. There is a moderate amount of stool in the colon.     Kidneys/ Ureters: Normal in size and location. No hydronephrosis or nephrolithiasis. The finding on the ultrasound corresponds to a subtle, but new 4.2 x 4 x 3.8 cm solid mass in the mid left kidney along the anteromedial surface.  This has subtly different enhancement when compared to the remainder of the kidney suggesting that this is a renal mass rather than normal lobulated parenchyma.     Retroperitoneum: No significant adenopathy.     Peritoneal space: No ascites. No free air.     Abdominal wall: Normal.     Vasculature: No significant atherosclerosis or aneurysmal dilatation of the aorta.     Bones: As previously, there are compression deformities of T11 and L2 with mild degenerative changes of the spine.     Impression:     1. The left renal finding on the ultrasound of October 21, 2022 corresponds to a subtle solid mass of the anteromedial aspect of the right kidney.  Although benign etiologies cannot be completely excluded, given the interval change in appearance since the previous examination, malignancy including renal cell carcinoma must be considered.  2. Stable hepatomegaly and hypodense lesion at the dome of the liver.  3. Degenerative changes with stable compression deformities of T11 and L2.  4. Multiple solid nodules in the visualized lower left lung.  Full CT of the chest to better evaluate the extent of the nodules is suggested with follow-up per Fleischner criteria based on the results of that examination.  This report was flagged in Epic as abnormal.        Electronically signed by: Amira Ivan MD  Date:                                            11/05/2022  Time:                                           09:12  Assessment and Plan:   1. Left renal mass  --Discussed imaging and possible treatment with pt and her mother.    --will obtain CBC, CMP, UA and UC today  --Scheduled chest Xray and CT renal protocol  --FU with  Dr. Thomas on 11/11 to discuss results and treatment options     .I spent over 60 minutes with the patient. Over 50% of the visit was spent in counseling and coordination of care.     This note is dictated on M*Modal word recognition program.  There are word recognition mistakes that are occasionally missed on review.

## 2022-11-08 DIAGNOSIS — N28.89 OTHER SPECIFIED DISORDERS OF KIDNEY AND URETER: Primary | ICD-10-CM

## 2022-11-08 DIAGNOSIS — R91.1 SOLITARY PULMONARY NODULE: Primary | ICD-10-CM

## 2022-11-08 DIAGNOSIS — R91.8 MULTIPLE LUNG NODULES: ICD-10-CM

## 2022-11-08 LAB — BACTERIA UR CULT: NO GROWTH

## 2022-11-09 ENCOUNTER — TELEPHONE (OUTPATIENT)
Dept: UROLOGY | Facility: CLINIC | Age: 42
End: 2022-11-09
Payer: MEDICARE

## 2022-11-09 ENCOUNTER — OFFICE VISIT (OUTPATIENT)
Dept: OBSTETRICS AND GYNECOLOGY | Facility: CLINIC | Age: 42
End: 2022-11-09
Attending: OBSTETRICS & GYNECOLOGY
Payer: MEDICARE

## 2022-11-09 VITALS
SYSTOLIC BLOOD PRESSURE: 117 MMHG | DIASTOLIC BLOOD PRESSURE: 86 MMHG | BODY MASS INDEX: 39.66 KG/M2 | WEIGHT: 238.31 LBS

## 2022-11-09 DIAGNOSIS — Z01.419 WELL WOMAN EXAM: Primary | ICD-10-CM

## 2022-11-09 LAB — TOPIRAMATE SERPL-MCNC: 8.5 MCG/ML

## 2022-11-09 PROCEDURE — G0101 CA SCREEN;PELVIC/BREAST EXAM: HCPCS | Mod: S$PBB,,, | Performed by: OBSTETRICS & GYNECOLOGY

## 2022-11-09 PROCEDURE — 99213 OFFICE O/P EST LOW 20 MIN: CPT | Mod: PBBFAC | Performed by: OBSTETRICS & GYNECOLOGY

## 2022-11-09 PROCEDURE — 99999 PR PBB SHADOW E&M-EST. PATIENT-LVL III: CPT | Mod: PBBFAC,,, | Performed by: OBSTETRICS & GYNECOLOGY

## 2022-11-09 PROCEDURE — G0101 PR CA SCREEN;PELVIC/BREAST EXAM: ICD-10-PCS | Mod: S$PBB,,, | Performed by: OBSTETRICS & GYNECOLOGY

## 2022-11-09 PROCEDURE — 99999 PR PBB SHADOW E&M-EST. PATIENT-LVL III: ICD-10-PCS | Mod: PBBFAC,,, | Performed by: OBSTETRICS & GYNECOLOGY

## 2022-11-09 NOTE — TELEPHONE ENCOUNTER
----- Message from Rena Ruiz NP sent at 11/9/2022  8:55 AM CST -----  Please notify the patient that her urine was negative for infection. No need for antibiotics at this time. Thanks!

## 2022-11-09 NOTE — TELEPHONE ENCOUNTER
----- Message from Gabi Winters sent at 11/9/2022 10:07 AM CST -----  Patient's dad is returning phone call.   Please contact him at [phone]

## 2022-11-09 NOTE — PROGRESS NOTES
CC: Well woman exam    Malorie Taylor is a 42 y.o. female  presents with her mother for well woman exam.  LMP: Patient's last menstrual period was 10/30/2022..  No gyn issues, problems, or complaints.      No hx abnormal pap  2018 last pap.  Attempt at pap last year insufficient.    Cycles regular every month.  Sometimes has mid-cycle spotting.     MMG normal.    Premedicated with .25 xanax this morning    Has left renal mass - has appt with Dr Thomas    Past Medical History:   Diagnosis Date    Allergy     Obesity 10/31/2015    Seizures     epilepsy     History reviewed. No pertinent surgical history.  Social History     Socioeconomic History    Marital status: Single   Occupational History    Occupation: Housekeeping   Tobacco Use    Smoking status: Never    Smokeless tobacco: Never   Substance and Sexual Activity    Alcohol use: Never    Drug use: Never    Sexual activity: Never     Social Determinants of Health     Financial Resource Strain: Low Risk     Difficulty of Paying Living Expenses: Not hard at all   Food Insecurity: No Food Insecurity    Worried About Running Out of Food in the Last Year: Never true    Ran Out of Food in the Last Year: Never true   Transportation Needs: No Transportation Needs    Lack of Transportation (Medical): No    Lack of Transportation (Non-Medical): No   Physical Activity: Insufficiently Active    Days of Exercise per Week: 3 days    Minutes of Exercise per Session: 10 min   Stress: No Stress Concern Present    Feeling of Stress : Not at all   Social Connections: Moderately Isolated    Frequency of Communication with Friends and Family: More than three times a week    Frequency of Social Gatherings with Friends and Family: More than three times a week    Attends Islam Services: More than 4 times per year    Active Member of Clubs or Organizations: No    Attends Club or Organization Meetings: Never    Marital Status: Never    Housing Stability: Low Risk      Unable to Pay for Housing in the Last Year: No    Number of Places Lived in the Last Year: 1    Unstable Housing in the Last Year: No     Family History   Problem Relation Age of Onset    No Known Problems Mother     Heart disease Father     No Known Problems Sister     No Known Problems Brother     No Known Problems Brother     Acne Neg Hx     Eczema Neg Hx     Lupus Neg Hx     Psoriasis Neg Hx     Melanoma Neg Hx     Breast cancer Neg Hx     Colon cancer Neg Hx     Ovarian cancer Neg Hx      OB History          0    Para   0    Term   0       0    AB   0    Living   0         SAB   0    IAB   0    Ectopic   0    Multiple   0    Live Births   0                 /86   Wt 108.1 kg (238 lb 5.1 oz)   LMP 10/30/2022   BMI 39.66 kg/m²       ROS:  GENERAL: Denies weight gain or weight loss. Feeling well overall.   SKIN: Denies rash or lesions.   HEAD: Denies head injury or headache.   NODES: Denies enlarged lymph nodes.   CHEST: Denies chest pain or shortness of breath.   CARDIOVASCULAR: Denies palpitations or left sided chest pain.   ABDOMEN: No abdominal pain, constipation, diarrhea, nausea, vomiting or rectal bleeding.   URINARY: No frequency, dysuria, hematuria, or burning on urination.  REPRODUCTIVE: See HPI.   BREASTS: The patient performs breast self-examination and denies pain, lumps, or nipple discharge.   HEMATOLOGIC: No easy bruisability or excessive bleeding.   MUSCULOSKELETAL: Denies joint pain or swelling.   NEUROLOGIC: Denies syncope or weakness.   PSYCHIATRIC: Denies depression, anxiety or mood swings.    PHYSICAL EXAM:  APPEARANCE: Well nourished, well developed, in no acute distress.  AFFECT: WNL, alert and oriented x 3  SKIN: No acne or hirsutism  NECK: Neck symmetric without masses or thyromegaly  NODES: No inguinal, cervical, axillary, or femoral lymph node enlargement  CHEST: Good respiratory effect  ABDOMEN: Soft.  No tenderness or masses.  No hepatosplenomegaly.  No  hernias.  BREASTS: Symmetrical, no skin changes or visible lesions.  No palpable masses, nipple discharge bilaterally.  PELVIC: Normal external genitalia without lesions.  Normal hair distribution.  Patient unable to tolerate speculum or bimanual exam  EXTREMITIES: No edema.      ASSESSMENT & PLAN    ICD-10-CM ICD-9-CM    1. Well woman exam  Z01.419 V72.31            D/w patient's mother - will increase dose of xanax and retry pelvic/pap  Patient was counseled today on A.C.S. Pap guidelines and recommendations for yearly pelvic exams, mammograms and monthly self breast exams; to see her PCP for other health maintenance.

## 2022-11-10 ENCOUNTER — HOSPITAL ENCOUNTER (OUTPATIENT)
Dept: RADIOLOGY | Facility: HOSPITAL | Age: 42
Discharge: HOME | End: 2022-11-10
Attending: NURSE PRACTITIONER
Payer: MEDICARE

## 2022-11-10 DIAGNOSIS — N28.89 LEFT RENAL MASS: ICD-10-CM

## 2022-11-10 PROCEDURE — 71046 X-RAY EXAM CHEST 2 VIEWS: CPT | Mod: TC,PN

## 2022-11-10 PROCEDURE — 71046 XR CHEST PA AND LATERAL: ICD-10-PCS | Mod: 26,,, | Performed by: RADIOLOGY

## 2022-11-10 PROCEDURE — 71046 X-RAY EXAM CHEST 2 VIEWS: CPT | Mod: 26,,, | Performed by: RADIOLOGY

## 2022-11-10 NOTE — PROGRESS NOTES
Subjective:      Malorie Taylor is a 42 y.o. female who presents today regarding her incidental left renal lesion.    US was ordered by Gyn because pt could not tolerated pelvic exam    Occasional blood on tissue paper  Her mother believes this is vaginal    No flank pain    Some dysuria    MR is scheduled for 11/16      The following portions of the patient's history were reviewed and updated as appropriate: allergies, current medications, past family history, past medical history, past social history, past surgical history and problem list.    Review of Systems  Pertinent items are noted in HPI.  A comprehensive multipoint review of systems was negative except as otherwise stated in the HPI.    Past Medical History:   Diagnosis Date    Allergy     Obesity 10/31/2015    Seizures     epilepsy     No past surgical history on file.    Review of patient's allergies indicates:   Allergen Reactions    Phenobarbital      lethargy          Objective:   Vitals: LMP 10/30/2022     Physical Exam   General: alert and oriented, no acute distress  Respiratory: Symmetric expansion, non-labored breathing  Cardiovascular: no peripheral edema  Abdomen: soft, non distended  No flank pain  Skin: normal coloration and turgor, no rashes, no suspicious skin lesions noted  Neuro: no gross deficits  Psych: normal judgment and insight, normal mood/affect, and non-anxious    Physical Exam    Lab Review   Urinalysis demonstrates nitrite neg  Trace rbc  Trace wbc    Lab Results   Component Value Date    WBC 6.78 11/07/2022    HGB 12.3 11/07/2022    HCT 36.6 (L) 11/07/2022     (H) 11/07/2022     11/07/2022     Lab Results   Component Value Date    CREATININE 0.7 11/07/2022    BUN 10 11/07/2022       Imaging  CT ABDOMEN W WO CONTRAST     CLINICAL HISTORY:  Renal mass;Abdominal pain, acute, nonlocalized;     TECHNIQUE:  Routine axial CT images of the abdomen were obtained before and after administration 100cc of IV Omnipaque  350.  Oral contrast with water was also utilized..  Coronal and Sagittal reformatted images were also obtained.     COMPARISON:  Abdominal ultrasound October 21, 2022; CT of the abdomen and pelvis May 29, 2019     FINDINGS:  Heart: Normal in Size.  No pericardial effusion.     Lungs: Numerous pulmonary nodules at the right lung base are better visualized on the current examination than previously; the largest is at the right lung base medially and measures 1.2 cm, not significantly changed.  Apparent increase in number of nodules may relate to differences in technique between the previous CT and the current examination.  Although for multiple solid nodules with any 6 mm or greater, Fleischner Society guidelines recommend follow up with non-contrast chest CT at 3-6 months and 18-24 months after discovery. The patient has not had a complete CT of chest to fully characterize the number, size, and extent of these nodules, however.  Follow-up recommendations cannot be issued based on the currently available examinations.     Liver: Mildly enlarged, 20.3 cm.  No significant change in the 1.2 cm hypodense lesion at the dome of the liver.  No new focal hepatic abnormalities.     Gallbladder: No calcified gallstones.     Bile ducts: No evidence of dilated ducts.     Pancreas: No mass or peripancreatic fat stranding.     Spleen: Upper normal in size, 11.6 cm.  No focal lesions.     Adrenals: Normal.     GI Tract/ Mesentery: No evidence of bowel obstruction or inflammation. There is a moderate amount of stool in the colon.     Kidneys/ Ureters: Normal in size and location. No hydronephrosis or nephrolithiasis. The finding on the ultrasound corresponds to a subtle, but new 4.2 x 4 x 3.8 cm solid mass in the mid left kidney along the anteromedial surface.  This has subtly different enhancement when compared to the remainder of the kidney suggesting that this is a renal mass rather than normal lobulated parenchyma.      Retroperitoneum: No significant adenopathy.     Peritoneal space: No ascites. No free air.     Abdominal wall: Normal.     Vasculature: No significant atherosclerosis or aneurysmal dilatation of the aorta.     Bones: As previously, there are compression deformities of T11 and L2 with mild degenerative changes of the spine.     Impression:     1. The left renal finding on the ultrasound of October 21, 2022 corresponds to a subtle solid mass of the anteromedial aspect of the right kidney.  Although benign etiologies cannot be completely excluded, given the interval change in appearance since the previous examination, malignancy including renal cell carcinoma must be considered.  2. Stable hepatomegaly and hypodense lesion at the dome of the liver.  3. Degenerative changes with stable compression deformities of T11 and L2.  4. Multiple solid nodules in the visualized lower left lung.  Full CT of the chest to better evaluate the extent of the nodules is suggested with follow-up per Fleischner criteria based on the results of that examination.  This report was flagged in Epic as abnormal.        Electronically signed by: Amira Ivan MD  Date:                                            11/05/2022  Time:                                           09:12        Assessment and Plan:   Left renal mass; normal renal parenchyma vs tumor  We discussed the natural history of small renal masses, the risk of malignancy and disease progression, and the small risk of mortality.  We discussed the risks and benefits of watchful waiting, biopsy, partial and total nephrectomy.  We discussed the benefits of renal preservation when possible.  We discussed percutaneous, laparoscopic and robotic approaches.  We discussed the management of positive surgical margins.  I answered all questions.    RTC after MR Abdomen 11/16    Discuss possibility of IR biopsy to help determine if this is normal parenchyma if MR is equivocal      Pulmonary  nodules  CT chest pending  Followed by Yesi Fierro NP      Dysuria  Ua and reflex cs

## 2022-11-11 ENCOUNTER — OFFICE VISIT (OUTPATIENT)
Dept: UROLOGY | Facility: CLINIC | Age: 42
End: 2022-11-11
Payer: MEDICARE

## 2022-11-11 VITALS
HEIGHT: 65 IN | OXYGEN SATURATION: 100 % | RESPIRATION RATE: 16 BRPM | WEIGHT: 238 LBS | SYSTOLIC BLOOD PRESSURE: 130 MMHG | BODY MASS INDEX: 39.65 KG/M2 | DIASTOLIC BLOOD PRESSURE: 60 MMHG | HEART RATE: 63 BPM

## 2022-11-11 DIAGNOSIS — N28.89 LEFT RENAL MASS: Primary | ICD-10-CM

## 2022-11-11 DIAGNOSIS — G40.909 SEIZURE DISORDER: ICD-10-CM

## 2022-11-11 LAB
BILIRUB UR QL STRIP: NEGATIVE
CLARITY UR REFRACT.AUTO: CLEAR
COLOR UR AUTO: YELLOW
GLUCOSE UR QL STRIP: NEGATIVE
HGB UR QL STRIP: NEGATIVE
KETONES UR QL STRIP: NEGATIVE
LEUKOCYTE ESTERASE UR QL STRIP: ABNORMAL
MICROSCOPIC COMMENT: NORMAL
NITRITE UR QL STRIP: NEGATIVE
PH UR STRIP: 7 [PH] (ref 5–8)
PROT UR QL STRIP: NEGATIVE
RBC #/AREA URNS AUTO: 1 /HPF (ref 0–4)
SP GR UR STRIP: 1.01 (ref 1–1.03)
SQUAMOUS #/AREA URNS AUTO: 14 /HPF
URN SPEC COLLECT METH UR: ABNORMAL
WBC #/AREA URNS AUTO: 2 /HPF (ref 0–5)

## 2022-11-11 PROCEDURE — 99214 OFFICE O/P EST MOD 30 MIN: CPT | Mod: S$GLB,,, | Performed by: UROLOGY

## 2022-11-11 PROCEDURE — 81001 URINALYSIS AUTO W/SCOPE: CPT | Performed by: UROLOGY

## 2022-11-11 PROCEDURE — 99214 PR OFFICE/OUTPT VISIT, EST, LEVL IV, 30-39 MIN: ICD-10-PCS | Mod: S$GLB,,, | Performed by: UROLOGY

## 2022-11-14 ENCOUNTER — TELEPHONE (OUTPATIENT)
Dept: OBSTETRICS AND GYNECOLOGY | Facility: CLINIC | Age: 42
End: 2022-11-14
Payer: MEDICARE

## 2022-11-14 RX ORDER — ALPRAZOLAM 1 MG/1
1 TABLET ORAL NIGHTLY PRN
Qty: 1 TABLET | Refills: 0 | Status: SHIPPED | OUTPATIENT
Start: 2022-11-14 | End: 2023-01-10

## 2022-11-14 RX ORDER — TOPIRAMATE 200 MG/1
TABLET ORAL
Qty: 180 TABLET | Refills: 3 | OUTPATIENT
Start: 2022-11-14

## 2022-11-14 NOTE — TELEPHONE ENCOUNTER
Pls call patient's mom - Dr Erika ford Malorie for higher dose of xanax for pelvic exam.    Pls schedule pap only visit.

## 2022-11-14 NOTE — TELEPHONE ENCOUNTER
Lvm for pt mother in regards to medication being sent in to help with anxiety for a pelvic exam   EP appt needs to be scheduled to collect pap smear.

## 2022-11-14 NOTE — TELEPHONE ENCOUNTER
----- Message from Nancy James MD PhD sent at 11/10/2022 10:18 AM CST -----  No ma'am. I think that should be fine. She will just be sleepy afterwards.  Thank you so much for reaching out and for taking such good care of her.     Nancy     ----- Message -----  From: Hannah Nelson MD  Sent: 11/10/2022   8:23 AM CST  To: Nancy James MD PhD    Good morning,    Malorie saw me yesterday for a gyn exam but was unable to tolerate a pelvic exam.  She premedicated with .25 Xanax, but it wasn't sufficient.   I'd like to give her 1 mg to retry the pelvic exam but wanted to make sure there wouldn't be any issues with her seizure meds.  Do you foresee any problem with that?    Thanks,  Hannah Nelson

## 2022-11-14 NOTE — TELEPHONE ENCOUNTER
Refused topiramate sure scripts prescription request.  This medication was just filled on 11/07/2022 ([facility]).    Nancy James MD PhD  Neurology-Epilepsy  [facility].

## 2022-11-15 ENCOUNTER — TELEPHONE (OUTPATIENT)
Dept: UROLOGY | Facility: CLINIC | Age: 42
End: 2022-11-15
Payer: MEDICARE

## 2022-11-15 NOTE — TELEPHONE ENCOUNTER
Spoke with patient's father on the phone regarding urine results. He plans to discuss with the patient's mother, and they will call back as needed. No further questions at this time.

## 2022-11-16 ENCOUNTER — TELEPHONE (OUTPATIENT)
Dept: OBSTETRICS AND GYNECOLOGY | Facility: CLINIC | Age: 42
End: 2022-11-16
Payer: MEDICARE

## 2022-11-16 NOTE — TELEPHONE ENCOUNTER
----- Message from Andra Angeles MA sent at 11/14/2022  4:18 PM CST -----  Pt wants to wait to schedule pelvic exam  ----- Message -----  From: Kayode Wallis  Sent: 11/14/2022   4:10 PM CST  To: Omar Young Staff    Name of Who is Calling: paul mother on behalf of LIZA DAVIS [[id number]]            What is the request in detail: Patient is requesting to wait to schedule pelvic exam being she is having issues with kidneys               Can the clinic reply by [facility]: no              What Number to Call Back if not in [facility]: [phone] mother

## 2022-11-16 NOTE — TELEPHONE ENCOUNTER
Spoke with pts Mom  Pt is at hosptial and having MRI now.  Pt will reach out when they are ready for annual.  Pt will need to be sedated for exam.

## 2022-11-16 NOTE — TELEPHONE ENCOUNTER
----- Message from Danielle Fritz sent at 11/16/2022  1:12 PM CST -----  Regarding: missed call       Who Called: Nelly         Who Left Message for Patient:Tiffanie         Does the patient know what this is regarding? No         Best Call Back Number: [phone]         Additional Information:

## 2022-11-16 NOTE — TELEPHONE ENCOUNTER
Spoke with Mom  Pt is scheduled with Dr Thomas for follow up to MRI in Dec  Mom will call after that appt to scheduled WWE with DR Nelson.

## 2022-12-04 NOTE — PROGRESS NOTES
Subjective:      Malorie Taylor is a 42 y.o. female who returns today regarding her     Here to review MR  No previous abdominal surgeries      On multiple meds for seizures.    The following portions of the patient's history were reviewed and updated as appropriate: allergies, current medications, past family history, past medical history, past social history, past surgical history and problem list.    Review of Systems  Pertinent items are noted in HPI.  A comprehensive multipoint review of systems was negative except as otherwise stated in the HPI.    Past Medical History:   Diagnosis Date    Allergy     Obesity 10/31/2015    Seizures     epilepsy     No past surgical history on file.    Review of patient's allergies indicates:   Allergen Reactions    Phenobarbital      lethargy          Objective:   Vitals: There were no vitals taken for this visit.    Physical Exam   General: alert and oriented, no acute distress  Respiratory: Symmetric expansion, non-labored breathing  Cardiovascular: no peripheral edema  Abdomen: soft, non distended  No scars  +panniculus  Skin: normal coloration and turgor, no rashes, no suspicious skin lesions noted  Neuro: no gross deficits  Psych: normal judgment and insight, normal mood/affect, and non-anxious    Physical Exam    Lab Review   Urinalysis demonstrates no specimen    Lab Results   Component Value Date    WBC 6.78 11/07/2022    HGB 12.3 11/07/2022    HCT 36.6 (L) 11/07/2022     (H) 11/07/2022     11/07/2022     Lab Results   Component Value Date    CREATININE 0.7 11/07/2022    BUN 10 11/07/2022       Imaging  MRI ABDOMEN W WO CONTRAST     CLINICAL HISTORY:  Renal mass;  Other specified disorders of kidney and ureter     TECHNIQUE:  Multiplanar multisequence images of the abdomen before and after administration of 10 mL Gadavist intravenous contrast.     COMPARISON:  CT 11/05/2022     FINDINGS:  Image quality is significantly degraded by motion artifact.      Inferior Thorax: Unremarkable.     Liver: Enlarged measuring 19 cm.  Normal background parenchymal signal.  1.3 cm T2 hyperintense lesion within the right hepatic dome (series 9, image 9) which may represent a cyst.  No solid enhancing lesion.  Portal veins appear patent.     Gallbladder: Unremarkable.     Bile Ducts: No dilatation.     Pancreas: No mass or ductal dilatation.     Spleen: Unremarkable.     Adrenals: Unremarkable.     Kidneys/Ureters: Solid enhancing left renal mass within the medial mid kidney measuring 3.9 x 3.6 x 4.3 cm (series 15, image 58).  Left renal vein is patent.  Right kidney is unremarkable.  No hydronephrosis.     GI Tract/Mesentery: No evidence of bowel obstruction or inflammation.     Peritoneal Space: No ascites.     Retroperitoneum: No pathologically enlarged nodes.     Abdominal wall: Unremarkable.     Vasculature: Abdominal aorta is normal caliber.     Bones: No suspicious marrow replacing lesion.     Impression:     1. Exam limited by motion artifact.  2. Solid enhancing left renal mass concerning for malignancy.  Left renal vein is patent.  No evidence of metastatic disease.  3. Right hepatic lobe cyst.  4. Hepatomegaly.        Electronically signed by: Carlo Sanchez  Date:                                            11/16/2022  Time:                                           10:31        Assessment and Plan:   Left renal mass    We discussed the natural history of small renal masses, the risk of malignancy and disease progression, and the small risk of mortality.  We discussed the risks and benefits of watchful waiting, biopsy, partial and total nephrectomy.  We discussed the benefits of renal preservation when possible.  We discussed percutaneous, laparoscopic and robotic approaches.  We discussed the management of positive surgical margins.  I answered all questions.    Given the endophytic nature of the mass and its location in the renal hilum posteriorly contacting the main  renal vessels, partial nephrectomy will be complicated.  Also, with the homogenous appearance, this may be an oncocytoma.  Therefore we will schedule biopsy of left renal mass with IR    RTC to see Dr William borden after above    I spent 45 min on the day of this encounter preparing for, treating and managing the above

## 2022-12-05 ENCOUNTER — OFFICE VISIT (OUTPATIENT)
Dept: UROLOGY | Facility: CLINIC | Age: 42
End: 2022-12-05
Payer: MEDICARE

## 2022-12-05 ENCOUNTER — TELEPHONE (OUTPATIENT)
Dept: OBSTETRICS AND GYNECOLOGY | Facility: CLINIC | Age: 42
End: 2022-12-05
Payer: MEDICARE

## 2022-12-05 VITALS
HEART RATE: 71 BPM | WEIGHT: 238 LBS | SYSTOLIC BLOOD PRESSURE: 124 MMHG | HEIGHT: 65 IN | DIASTOLIC BLOOD PRESSURE: 84 MMHG | BODY MASS INDEX: 39.65 KG/M2

## 2022-12-05 DIAGNOSIS — N28.89 LEFT RENAL MASS: Primary | ICD-10-CM

## 2022-12-05 PROCEDURE — 99214 PR OFFICE/OUTPT VISIT, EST, LEVL IV, 30-39 MIN: ICD-10-PCS | Mod: S$PBB,,, | Performed by: UROLOGY

## 2022-12-05 PROCEDURE — 99213 OFFICE O/P EST LOW 20 MIN: CPT | Mod: PBBFAC,PN | Performed by: UROLOGY

## 2022-12-05 PROCEDURE — 99999 PR PBB SHADOW E&M-EST. PATIENT-LVL III: CPT | Mod: PBBFAC,,, | Performed by: UROLOGY

## 2022-12-05 PROCEDURE — 99214 OFFICE O/P EST MOD 30 MIN: CPT | Mod: S$PBB,,, | Performed by: UROLOGY

## 2022-12-05 PROCEDURE — 99999 PR PBB SHADOW E&M-EST. PATIENT-LVL III: ICD-10-PCS | Mod: PBBFAC,,, | Performed by: UROLOGY

## 2022-12-05 NOTE — TELEPHONE ENCOUNTER
----- Message from Danielle Fritz sent at 12/5/2022 10:31 AM CST -----  Regarding: appointment  Name of Who is Calling: montana Villegas           What is the request in detail: Nelly is requesting a call back to schedule patient a [facility] appointment.            Can the clinic reply by [facility]: No           [facility]: [phone]

## 2022-12-06 ENCOUNTER — TELEPHONE (OUTPATIENT)
Dept: INTERVENTIONAL RADIOLOGY/VASCULAR | Facility: CLINIC | Age: 42
End: 2022-12-06
Payer: MEDICARE

## 2022-12-06 NOTE — PROGRESS NOTES
Malorie Taylor  1980        Subjective     Chief Complaint: Est Care    History of Present Illness:  Ms. Malorie Taylor is a 42 y.o. female who presents to clinic for est care. Coming with her mom today.     Seizures- on stable meds (topiramate 200 mg BID, carbamazepine 400 mg BID and gabapentin 1200 mg TID). Sees Neurology. Had DEXA 12/2021.     Renal mass- seeing Urology, planning on doing biopsy with IR. No family history of cancer.     Lung nodules seen on CT- CT chest f/u ordered but not yet done. Not a smoker.     HLD- can check lipids.     Mammogram-due 6/2023.   Pap- attempted but unable to be done. Seeing OBGYN soon to try again.     Flu vaccine today.       Review of Systems   Constitutional:  Negative for chills and fever.   HENT:  Negative for sore throat.    Respiratory:  Positive for cough.    Cardiovascular:  Negative for leg swelling.   Gastrointestinal:  Negative for abdominal pain, diarrhea, nausea and vomiting.   Musculoskeletal:  Negative for myalgias.   Neurological:  Negative for sensory change and focal weakness.      PAST HISTORY:     Past Medical History:   Diagnosis Date    Allergy     Obesity 10/31/2015    Seizures     epilepsy       History reviewed. No pertinent surgical history.    Family History   Problem Relation Age of Onset    No Known Problems Mother     Heart disease Father     No Known Problems Sister     No Known Problems Brother     No Known Problems Brother     Acne Neg Hx     Eczema Neg Hx     Lupus Neg Hx     Psoriasis Neg Hx     Melanoma Neg Hx     Breast cancer Neg Hx     Colon cancer Neg Hx     Ovarian cancer Neg Hx        Social History     Socioeconomic History    Marital status: Single   Occupational History    Occupation: Housekeeping   Tobacco Use    Smoking status: Never    Smokeless tobacco: Never   Substance and Sexual Activity    Alcohol use: Never    Drug use: Never    Sexual activity: Never     Social Determinants of Health     Financial Resource  Strain: Low Risk     Difficulty of Paying Living Expenses: Not hard at all   Food Insecurity: No Food Insecurity    Worried About Running Out of Food in the Last Year: Never true    Ran Out of Food in the Last Year: Never true   Transportation Needs: No Transportation Needs    Lack of Transportation (Medical): No    Lack of Transportation (Non-Medical): No   Physical Activity: Insufficiently Active    Days of Exercise per Week: 3 days    Minutes of Exercise per Session: 10 min   Stress: No Stress Concern Present    Feeling of Stress : Not at all   Social Connections: Moderately Isolated    Frequency of Communication with Friends and Family: More than three times a week    Frequency of Social Gatherings with Friends and Family: More than three times a week    Attends Temple Services: More than 4 times per year    Active Member of Clubs or Organizations: No    Attends Club or Organization Meetings: Never    Marital Status: Never    Housing Stability: Low Risk     Unable to Pay for Housing in the Last Year: No    Number of Places Lived in the Last Year: 1    Unstable Housing in the Last Year: No       MEDICATIONS & ALLERGIES:     Current Outpatient Medications on File Prior to Visit   Medication Sig    ALPRAZolam (XANAX) 1 MG tablet Take 1 tablet (1 mg total) by mouth nightly as needed for Insomnia. Take 1 hour prior to office appointment    b complex vitamins capsule Take 1 capsule by mouth once daily.    carBAMazepine (TEGRETOL XR) 400 MG Tb12 TAKE 1 TABLET(400 MG) BY MOUTH TWICE DAILY    cranberry fruit concentrate (AZO CRANBERRY ORAL) Take by mouth.    docusate sodium (COLACE) 100 MG capsule Take 100 mg by mouth 2 (two) times daily.    fish oil-omega-3 fatty acids 300-1,000 mg capsule Take 1 g by mouth 2 (two) times daily.    folic acid (FOLVITE) 400 MCG tablet Take 400 mcg by mouth once daily.    gabapentin (NEURONTIN) 600 MG tablet Take 2 tablets (1,200 mg total) by mouth 3 (three) times daily.     "multivitamin capsule Take 1 capsule by mouth once daily.    topiramate (TOPAMAX) 200 MG Tab Take 1 tablet (200 mg total) by mouth 2 (two) times daily.    vitamin D 1000 units Tab Take 185 mg by mouth once daily.    [DISCONTINUED] flu vacc [id number] 6mos up,PF, (FLUARIX QUAD [id number], PF,) 60 mcg (15 mcg x 4)/0.5 mL Syrg Inject in the muscle (Patient not taking: Reported on 12/8/2022)     No current facility-administered medications on file prior to visit.       Review of patient's allergies indicates:   Allergen Reactions    Phenobarbital      lethargy       OBJECTIVE:     Vital Signs:  Vitals:    12/08/22 0926   BP: 110/64   BP Location: Right arm   Patient Position: Sitting   BP Method: Large (Manual)   Pulse: (!) 59   Temp: 98.2 °F (36.8 °C)   TempSrc: Oral   SpO2: 100%   Weight: 111 kg (244 lb 11.4 oz)   Height: 5' 5" (1.651 m)       Body mass index is 40.72 kg/m².     Physical Exam:  Physical Exam  Vitals and nursing note reviewed.   Constitutional:       General: She is not in acute distress.     Appearance: Normal appearance. She is not ill-appearing, toxic-appearing or diaphoretic.   HENT:      Head: Normocephalic and atraumatic.   Eyes:      General: No scleral icterus.  Cardiovascular:      Rate and Rhythm: Normal rate and regular rhythm.   Pulmonary:      Effort: Pulmonary effort is normal. No respiratory distress.   Musculoskeletal:         General: Normal range of motion.      Cervical back: Normal range of motion and neck supple. No rigidity or tenderness.      Right lower leg: No edema.      Left lower leg: No edema.   Skin:     General: Skin is warm and dry.   Neurological:      Mental Status: She is alert. Mental status is at baseline.   Psychiatric:         Mood and Affect: Affect normal.          Laboratory  Lab Results   Component Value Date    WBC 6.78 11/07/2022    HGB 12.3 11/07/2022    HCT 36.6 (L) 11/07/2022     (H) 11/07/2022     11/07/2022     Lab Results   Component Value " Date    GLU 91 11/07/2022     11/07/2022    K 3.5 11/07/2022     11/07/2022    CO2 26 11/07/2022    BUN 10 11/07/2022    CREATININE 0.7 11/07/2022    CALCIUM 9.5 11/07/2022     No results found for: INR, PROTIME  Lab Results   Component Value Date    HGBA1C 4.8 10/27/2015           Health Maintenance         Date Due Completion Date    Pneumococcal Vaccines (Age 0-64) (1 - PCV) Never done ---    COVID-19 Vaccine (4 - Booster for Moderna series) 12/30/2021 11/4/2021    Influenza Vaccine (1) 09/01/2022 3/9/2022    Override on 4/13/2021: Done    Override on 10/15/2019: Done    HIV Screening 02/19/2026 (Originally 3/7/1995) ---    Mammogram 06/01/2023 6/1/2022    Cervical Cancer Screening 03/17/2024 3/17/2021    TETANUS VACCINE 02/01/2028 2/1/2018              ASSESSMENT & PLAN:   Ms. Malorie Taylor is a 42 y.o. female who was seen today in clinic for est care.      1. Seizure disorder  -     Lipid Panel; Future; Expected date: 12/12/2022  -     HEMOGLOBIN A1C; Future; Expected date: 12/12/2022  -     Vitamin D; Future; Expected date: 12/12/2022  -     VITAMIN B12; Future; Expected date: 12/12/2022  -     HIV 1/2 Ag/Ab (4th Gen); Future; Expected date: 12/12/2022    2. Renal mass  -Noted on imaging, following with Urology. Plan for IR biopsy next week    3. Multiple lung nodules  -Lung Ct ordered, not yet done. Family waiting for renal bx first.       Flu vaccine today.       Martita Rico MD  Internal Medicine

## 2022-12-06 NOTE — TELEPHONE ENCOUNTER
Spoke with patient's mother , an appointment with the Interventional Radiology clinic is scheduled for 1/10/23 at 9:00 a.m . Ref. By Dr. Thomas .

## 2022-12-08 ENCOUNTER — OFFICE VISIT (OUTPATIENT)
Dept: PRIMARY CARE CLINIC | Facility: CLINIC | Age: 42
End: 2022-12-08
Payer: MEDICARE

## 2022-12-08 VITALS
WEIGHT: 244.69 LBS | SYSTOLIC BLOOD PRESSURE: 110 MMHG | HEART RATE: 59 BPM | BODY MASS INDEX: 40.77 KG/M2 | TEMPERATURE: 98 F | HEIGHT: 65 IN | OXYGEN SATURATION: 100 % | DIASTOLIC BLOOD PRESSURE: 64 MMHG

## 2022-12-08 DIAGNOSIS — R91.8 MULTIPLE LUNG NODULES: ICD-10-CM

## 2022-12-08 DIAGNOSIS — Z79.899 OTHER LONG TERM (CURRENT) DRUG THERAPY: ICD-10-CM

## 2022-12-08 DIAGNOSIS — Z23 NEED FOR PROPHYLACTIC VACCINATION AND INOCULATION AGAINST INFLUENZA: ICD-10-CM

## 2022-12-08 DIAGNOSIS — G40.909 SEIZURE DISORDER: Primary | ICD-10-CM

## 2022-12-08 DIAGNOSIS — N28.89 RENAL MASS: ICD-10-CM

## 2022-12-08 DIAGNOSIS — R79.9 ABNORMAL FINDING OF BLOOD CHEMISTRY, UNSPECIFIED: ICD-10-CM

## 2022-12-08 PROCEDURE — 99213 OFFICE O/P EST LOW 20 MIN: CPT | Mod: PBBFAC,PN,25 | Performed by: STUDENT IN AN ORGANIZED HEALTH CARE EDUCATION/TRAINING PROGRAM

## 2022-12-08 PROCEDURE — G0008 ADMIN INFLUENZA VIRUS VAC: HCPCS | Mod: PBBFAC,PN

## 2022-12-08 PROCEDURE — 99396 PR PREVENTIVE VISIT,EST,40-64: ICD-10-PCS | Mod: S$PBB,GZ,, | Performed by: STUDENT IN AN ORGANIZED HEALTH CARE EDUCATION/TRAINING PROGRAM

## 2022-12-08 PROCEDURE — 99999 PR PBB SHADOW E&M-EST. PATIENT-LVL III: ICD-10-PCS | Mod: PBBFAC,,, | Performed by: STUDENT IN AN ORGANIZED HEALTH CARE EDUCATION/TRAINING PROGRAM

## 2022-12-08 PROCEDURE — 99999 PR PBB SHADOW E&M-EST. PATIENT-LVL III: CPT | Mod: PBBFAC,,, | Performed by: STUDENT IN AN ORGANIZED HEALTH CARE EDUCATION/TRAINING PROGRAM

## 2022-12-08 PROCEDURE — 99396 PREV VISIT EST AGE 40-64: CPT | Mod: S$PBB,GZ,, | Performed by: STUDENT IN AN ORGANIZED HEALTH CARE EDUCATION/TRAINING PROGRAM

## 2022-12-08 RX ORDER — DOCUSATE SODIUM 100 MG/1
100 CAPSULE, LIQUID FILLED ORAL 2 TIMES DAILY
COMMUNITY
End: 2024-04-03

## 2022-12-08 NOTE — PROGRESS NOTES
Two patient identifiers verified.    Allergies reviewed.    Flu injection administered IM to L deltoid per MD order.    Patient tolerated injection well; no redness, bleeding, or bruising noted to injection site.    Patient instructed to remain in clinic setting for 15 minutes. Verbalizes understanding.

## 2023-01-09 ENCOUNTER — OFFICE VISIT (OUTPATIENT)
Dept: OBSTETRICS AND GYNECOLOGY | Facility: CLINIC | Age: 43
End: 2023-01-09
Attending: OBSTETRICS & GYNECOLOGY
Payer: MEDICARE

## 2023-01-09 VITALS — BODY MASS INDEX: 39.4 KG/M2 | SYSTOLIC BLOOD PRESSURE: 132 MMHG | WEIGHT: 236.75 LBS | DIASTOLIC BLOOD PRESSURE: 88 MMHG

## 2023-01-09 DIAGNOSIS — Z01.419 WELL WOMAN EXAM: Primary | ICD-10-CM

## 2023-01-09 PROCEDURE — 99499 NO LOS: ICD-10-PCS | Mod: S$PBB,,, | Performed by: OBSTETRICS & GYNECOLOGY

## 2023-01-09 PROCEDURE — 88175 CYTOPATH C/V AUTO FLUID REDO: CPT | Performed by: OBSTETRICS & GYNECOLOGY

## 2023-01-09 PROCEDURE — 99213 OFFICE O/P EST LOW 20 MIN: CPT | Mod: PBBFAC | Performed by: OBSTETRICS & GYNECOLOGY

## 2023-01-09 PROCEDURE — 99999 PR PBB SHADOW E&M-EST. PATIENT-LVL III: ICD-10-PCS | Mod: PBBFAC,,, | Performed by: OBSTETRICS & GYNECOLOGY

## 2023-01-09 PROCEDURE — 87624 HPV HI-RISK TYP POOLED RSLT: CPT | Performed by: OBSTETRICS & GYNECOLOGY

## 2023-01-09 PROCEDURE — 99999 PR PBB SHADOW E&M-EST. PATIENT-LVL III: CPT | Mod: PBBFAC,,, | Performed by: OBSTETRICS & GYNECOLOGY

## 2023-01-09 PROCEDURE — 99499 UNLISTED E&M SERVICE: CPT | Mod: S$PBB,,, | Performed by: OBSTETRICS & GYNECOLOGY

## 2023-01-09 RX ORDER — OMEGA-3S/DHA/EPA/FISH OIL 1000-1400
CAPSULE,DELAYED RELEASE (ENTERIC COATED) ORAL
COMMUNITY

## 2023-01-09 RX ORDER — FERROUS SULFATE 325(65) MG
325 TABLET ORAL
COMMUNITY

## 2023-01-09 NOTE — PROGRESS NOTES
CC: pap    Malorie Taylor is a 42 y.o. female  presents for pap & HPV.  LMP: Patient's last menstrual period was 2022 (exact date)..      Premedicated today.    PELVIC: Normal external genitalia without lesions.  Normal hair distribution.  Adequate perineal body, normal urethral meatus.  Pediatric speculum used - unable to visualize cervix.  Blind pap attempted.  No significant cystocele or rectocele.  Bimanual exam shows uterus to be normal size, regular, mobile and nontender.  Adnexa without masses or tenderness.    EXTREMITIES: No edema.      ASSESSMENT & PLAN    ICD-10-CM ICD-9-CM    1. Well woman exam  Z01.419 V72.31 HPV High Risk Genotypes, PCR      Liquid-Based Pap Smear, Screening

## 2023-01-10 ENCOUNTER — OFFICE VISIT (OUTPATIENT)
Dept: INTERVENTIONAL RADIOLOGY/VASCULAR | Facility: CLINIC | Age: 43
End: 2023-01-10
Payer: MEDICARE

## 2023-01-10 ENCOUNTER — LAB VISIT (OUTPATIENT)
Dept: LAB | Facility: HOSPITAL | Age: 43
End: 2023-01-10
Payer: MEDICARE

## 2023-01-10 VITALS
HEIGHT: 65 IN | DIASTOLIC BLOOD PRESSURE: 65 MMHG | SYSTOLIC BLOOD PRESSURE: 144 MMHG | BODY MASS INDEX: 39.99 KG/M2 | WEIGHT: 240 LBS

## 2023-01-10 DIAGNOSIS — N28.89 RENAL MASS: Primary | ICD-10-CM

## 2023-01-10 DIAGNOSIS — N28.89 RENAL MASS: ICD-10-CM

## 2023-01-10 LAB
BASOPHILS # BLD AUTO: 0.01 K/UL (ref 0–0.2)
BASOPHILS NFR BLD: 0.2 % (ref 0–1.9)
DIFFERENTIAL METHOD: ABNORMAL
EOSINOPHIL # BLD AUTO: 0.1 K/UL (ref 0–0.5)
EOSINOPHIL NFR BLD: 1.4 % (ref 0–8)
ERYTHROCYTE [DISTWIDTH] IN BLOOD BY AUTOMATED COUNT: 11.8 % (ref 11.5–14.5)
HCT VFR BLD AUTO: 38 % (ref 37–48.5)
HGB BLD-MCNC: 12.9 G/DL (ref 12–16)
IMM GRANULOCYTES # BLD AUTO: 0.02 K/UL (ref 0–0.04)
IMM GRANULOCYTES NFR BLD AUTO: 0.4 % (ref 0–0.5)
INR PPP: 1 (ref 0.8–1.2)
LYMPHOCYTES # BLD AUTO: 1.9 K/UL (ref 1–4.8)
LYMPHOCYTES NFR BLD: 34.1 % (ref 18–48)
MCH RBC QN AUTO: 34.8 PG (ref 27–31)
MCHC RBC AUTO-ENTMCNC: 33.9 G/DL (ref 32–36)
MCV RBC AUTO: 102 FL (ref 82–98)
MONOCYTES # BLD AUTO: 0.4 K/UL (ref 0.3–1)
MONOCYTES NFR BLD: 7.2 % (ref 4–15)
NEUTROPHILS # BLD AUTO: 3.1 K/UL (ref 1.8–7.7)
NEUTROPHILS NFR BLD: 56.7 % (ref 38–73)
NRBC BLD-RTO: 0 /100 WBC
PLATELET # BLD AUTO: 282 K/UL (ref 150–450)
PMV BLD AUTO: 8.5 FL (ref 9.2–12.9)
PROTHROMBIN TIME: 10.2 SEC (ref 9–12.5)
RBC # BLD AUTO: 3.71 M/UL (ref 4–5.4)
WBC # BLD AUTO: 5.54 K/UL (ref 3.9–12.7)

## 2023-01-10 PROCEDURE — 85025 COMPLETE CBC W/AUTO DIFF WBC: CPT | Performed by: FAMILY MEDICINE

## 2023-01-10 PROCEDURE — 85610 PROTHROMBIN TIME: CPT | Performed by: FAMILY MEDICINE

## 2023-01-10 PROCEDURE — 99214 OFFICE O/P EST MOD 30 MIN: CPT | Mod: S$PBB,,, | Performed by: FAMILY MEDICINE

## 2023-01-10 PROCEDURE — 99999 PR PBB SHADOW E&M-EST. PATIENT-LVL III: CPT | Mod: PBBFAC,,, | Performed by: FAMILY MEDICINE

## 2023-01-10 PROCEDURE — 99999 PR PBB SHADOW E&M-EST. PATIENT-LVL III: ICD-10-PCS | Mod: PBBFAC,,, | Performed by: FAMILY MEDICINE

## 2023-01-10 PROCEDURE — 36415 COLL VENOUS BLD VENIPUNCTURE: CPT | Performed by: FAMILY MEDICINE

## 2023-01-10 PROCEDURE — 99213 OFFICE O/P EST LOW 20 MIN: CPT | Mod: PBBFAC | Performed by: FAMILY MEDICINE

## 2023-01-10 PROCEDURE — 99214 PR OFFICE/OUTPT VISIT, EST, LEVL IV, 30-39 MIN: ICD-10-PCS | Mod: S$PBB,,, | Performed by: FAMILY MEDICINE

## 2023-01-10 RX ORDER — POTASSIUM &MAGNESIUM ASPARTATE 250-250 MG
CAPSULE ORAL
COMMUNITY

## 2023-01-10 NOTE — Clinical Note
"Thank you for referring Ms. Taylor to Interventional Radiology at the [facility]. Please don't hesitate to contact us if there are any questions regarding this evaluation at [phone]. If you have any other patients for whom you would like a consultation, please place an order for "[id number]", and we will be happy to review their case.  Sincerely, VICTOR MANUEL Garcia, FNP Interventional Radiology   "

## 2023-01-10 NOTE — PROGRESS NOTES
"Subjective:       Patient ID: Malorie Taylor is a 42 y.o. female.    Chief Complaint: Lesion    Patient referred to Interventional Radiology by Maxx Thomas MD for evaluation of a left renal mass. Patient is accompanied by her parents, who help to provide her history. Lesion was incidentally identified during ultrasound for gynecology (patient could not tolerate exam). MRI obtained on 11/16/2022 noted "Solid enhancing left renal mass concerning for malignancy." Patient reports feeling well today. Her mother tells me she has had some intermittent abdominal pain. She denies any hematuria, dysuria, or flank pain.     Review of Systems   Constitutional:  Negative for activity change, appetite change, chills, fatigue and fever.   HENT:  Negative for nasal congestion, drooling, ear discharge, postnasal drip, sneezing and trouble swallowing.    Eyes:  Negative for pain, discharge, redness and itching.   Respiratory:  Negative for cough, shortness of breath, wheezing and stridor.    Cardiovascular:  Negative for chest pain, palpitations and leg swelling.   Gastrointestinal:  Negative for abdominal distention, abdominal pain, constipation, diarrhea, nausea and vomiting.   Genitourinary:  Negative for difficulty urinating, dysuria, frequency and urgency.   Musculoskeletal:  Negative for arthralgias, back pain, gait problem, joint swelling, myalgias and neck pain.   Integumentary:  Negative for color change, pallor and rash.   Neurological:  Negative for dizziness, weakness and headaches.       Objective:      Physical Exam  Vitals reviewed.   Constitutional:       General: She is not in acute distress.     Appearance: She is well-developed. She is not diaphoretic.   HENT:      Head: Normocephalic and atraumatic.      Right Ear: External ear normal.      Left Ear: External ear normal.      Nose: Nose normal.      Mouth/Throat:      Pharynx: No oropharyngeal exudate.   Eyes:      General: No scleral icterus.        Right " eye: No discharge.         Left eye: No discharge.      Conjunctiva/sclera: Conjunctivae normal.      Pupils: Pupils are equal, round, and reactive to light.   Neck:      Thyroid: No thyromegaly.      Vascular: No JVD.      Trachea: No tracheal deviation.   Cardiovascular:      Rate and Rhythm: Normal rate and regular rhythm.      Heart sounds: Normal heart sounds. No murmur heard.    No friction rub. No gallop.   Pulmonary:      Effort: Pulmonary effort is normal. No respiratory distress.      Breath sounds: Normal breath sounds. No stridor. No wheezing or rales.   Abdominal:      General: Bowel sounds are normal. There is no distension.      Palpations: Abdomen is soft. There is no mass.      Tenderness: There is no abdominal tenderness. There is no right CVA tenderness, left CVA tenderness, guarding or rebound.   Musculoskeletal:      Cervical back: Neck supple.   Lymphadenopathy:      Cervical: No cervical adenopathy.   Skin:     General: Skin is warm and dry.      Nails: There is no clubbing.   Neurological:      Mental Status: She is alert and oriented to person, place, and time.      Gait: Gait normal.       Reviewed progress note from Dr. Thomas.    MRI 11/16/2023    Assessment:       Problem List Items Addressed This Visit          Renal/    Renal mass - Primary    Relevant Orders    IR CT Guidance    CBC Auto Differential    Protime-INR       Plan:         Discussed case with Dr. Antoine. Explained to patient and family can offer biopsy of left renal mass. Discussed how the procedure will be performed, risks (including, but not limited to, pain, bleeding, infection, damage to nearby structures, and the need for additional procedures), benefits, possible complications, pre-post procedure expectations, and alternatives. Advised to hold fish oil until after biopsy. The patient voices understanding and all questions have been answered.  The patient agrees to proceed as planned. Patient scheduled for  1/18/2023 at our [facility] facility. Pre-procedure handout with clinic phone number provided. Patient will have pre-procedure labs drawn today.

## 2023-01-10 NOTE — LETTER
January 10, 2023    Malorie Salome Bene  [address]  [facility] [address]     [facility] [address]  [address]  [facility] [address]  Phone: [phone] PRE-PROCEDURE INSTRUCTIONS    Your procedure with Interventional Radiology is scheduled for 1/18/2023. Please arrive by 8:00am. Please note your appointment time in [facility] will be different.    You must check-in and receive a wristband before going to your procedure. We will call you the day before to let you know your check-in location.    DO NOT take fish oil for 7 days before your procedure.    **Do not eat or drink anything between midnight and the time of your procedure. This includes gum, mints, and candy lemon drops.    **Do not smoke or drink alcoholic beverages 24 hours prior to your procedure.    **If you wear contact lenses, dentures, hearing aids, or glasses, bring a container to put them in during the procedure and give them to a family member for safekeeping.    **If you have been diagnosed with sleep apnea please bring your CPAP machine.    **If your doctor has scheduled you for an overnight stay, bring a small overnight bag with any personal items that you may need.    **Make arrangements in advance for transportation home by a responsible adult. It is not safe to drive a vehicle during the 24 hours following the procedure.    **All [facility] facilities and properties are tobacco free. Smoking is NOT allowed.    PLEASE NOTE: The procedure schedule has many variables which affect the time of your procedure. Family members should be available if your surgery time changes.    If you have any questions about these instructions call Interventional Radiology at [phone] Monday - Friday between 8:00am and 4:00pm or [phone] (ask for interventional radiology resident) for after hours.

## 2023-01-18 ENCOUNTER — HOSPITAL ENCOUNTER (OUTPATIENT)
Facility: OTHER | Age: 43
Discharge: HOME | End: 2023-01-18
Attending: RADIOLOGY | Admitting: RADIOLOGY
Payer: MEDICARE

## 2023-01-18 ENCOUNTER — HOSPITAL ENCOUNTER (OUTPATIENT)
Dept: RADIOLOGY | Facility: OTHER | Age: 43
Discharge: HOME | End: 2023-01-18
Attending: FAMILY MEDICINE
Payer: MEDICARE

## 2023-01-18 VITALS
OXYGEN SATURATION: 100 % | DIASTOLIC BLOOD PRESSURE: 65 MMHG | RESPIRATION RATE: 22 BRPM | HEART RATE: 56 BPM | SYSTOLIC BLOOD PRESSURE: 145 MMHG | TEMPERATURE: 99 F

## 2023-01-18 DIAGNOSIS — N28.89 RENAL MASS: ICD-10-CM

## 2023-01-18 DIAGNOSIS — N28.89 RENAL MASS, LEFT: ICD-10-CM

## 2023-01-18 PROCEDURE — 88333 PR  INTRAOPERATIVE CYTO PATH CONSULT, INITIAL SITE: ICD-10-PCS | Mod: 26,,, | Performed by: PATHOLOGY

## 2023-01-18 PROCEDURE — 99152 PR MOD CONSCIOUS SEDATION, SAME PHYS, 5+ YRS, FIRST 15 MIN: ICD-10-PCS | Mod: ,,, | Performed by: RADIOLOGY

## 2023-01-18 PROCEDURE — 77012 CT SCAN FOR NEEDLE BIOPSY: CPT | Mod: 26,,, | Performed by: RADIOLOGY

## 2023-01-18 PROCEDURE — 99152 MOD SED SAME PHYS/QHP 5/>YRS: CPT | Mod: ,,, | Performed by: RADIOLOGY

## 2023-01-18 PROCEDURE — 50200 IR CT GUIDANCE FOR NEEDLE PLACEMENT: ICD-10-PCS | Mod: LT,,, | Performed by: RADIOLOGY

## 2023-01-18 PROCEDURE — 88342 IMHCHEM/IMCYTCHM 1ST ANTB: CPT | Mod: 26,,, | Performed by: PATHOLOGY

## 2023-01-18 PROCEDURE — 77012 CT SCAN FOR NEEDLE BIOPSY: CPT | Mod: TC | Performed by: RADIOLOGY

## 2023-01-18 PROCEDURE — 88305 TISSUE EXAM BY PATHOLOGIST: CPT | Mod: 26,,, | Performed by: PATHOLOGY

## 2023-01-18 PROCEDURE — 88305 TISSUE EXAM BY PATHOLOGIST: CPT | Performed by: PATHOLOGY

## 2023-01-18 PROCEDURE — 88341 IMHCHEM/IMCYTCHM EA ADD ANTB: CPT | Mod: 59 | Performed by: PATHOLOGY

## 2023-01-18 PROCEDURE — 88341 PR IHC OR ICC EACH ADD'L SINGLE ANTIBODY  STAINPR: ICD-10-PCS | Mod: 26,,, | Performed by: PATHOLOGY

## 2023-01-18 PROCEDURE — 50200 RENAL BIOPSY PERQ: CPT | Mod: LT,,, | Performed by: RADIOLOGY

## 2023-01-18 PROCEDURE — 88342 CHG IMMUNOCYTOCHEMISTRY: ICD-10-PCS | Mod: 26,,, | Performed by: PATHOLOGY

## 2023-01-18 PROCEDURE — 50200 RENAL BIOPSY PERQ: CPT | Mod: LT | Performed by: RADIOLOGY

## 2023-01-18 PROCEDURE — 88342 IMHCHEM/IMCYTCHM 1ST ANTB: CPT | Performed by: PATHOLOGY

## 2023-01-18 PROCEDURE — 63600175 PHARM REV CODE 636 W HCPCS: Performed by: RADIOLOGY

## 2023-01-18 PROCEDURE — 88333 PATH CONSLTJ SURG CYTO XM 1: CPT | Performed by: PATHOLOGY

## 2023-01-18 PROCEDURE — 88333 PATH CONSLTJ SURG CYTO XM 1: CPT | Mod: 26,,, | Performed by: PATHOLOGY

## 2023-01-18 PROCEDURE — 99152 MOD SED SAME PHYS/QHP 5/>YRS: CPT | Performed by: RADIOLOGY

## 2023-01-18 PROCEDURE — 77012 PR  CT GUIDANCE NEEDLE PLACEMENT: ICD-10-PCS | Mod: 26,,, | Performed by: RADIOLOGY

## 2023-01-18 PROCEDURE — 88341 IMHCHEM/IMCYTCHM EA ADD ANTB: CPT | Mod: 26,,, | Performed by: PATHOLOGY

## 2023-01-18 PROCEDURE — 88305 TISSUE EXAM BY PATHOLOGIST: ICD-10-PCS | Mod: 26,,, | Performed by: PATHOLOGY

## 2023-01-18 RX ORDER — FENTANYL CITRATE 50 UG/ML
INJECTION, SOLUTION INTRAMUSCULAR; INTRAVENOUS
Status: DISCONTINUED | OUTPATIENT
Start: 2023-01-18 | End: 2023-01-20 | Stop reason: HOSPADM

## 2023-01-18 RX ORDER — MIDAZOLAM HYDROCHLORIDE 1 MG/ML
INJECTION INTRAMUSCULAR; INTRAVENOUS
Status: DISCONTINUED | OUTPATIENT
Start: 2023-01-18 | End: 2023-01-20 | Stop reason: HOSPADM

## 2023-01-18 NOTE — PLAN OF CARE
Malorie Taylor has met all discharge criteria from Phase II. Vital Signs are stable, ambulating without difficulty. No bleeding or hematoma noted to site.  Discharge instructions given, patient verbalized understanding. Discharged from facility via wheelchair in stable condition.

## 2023-01-18 NOTE — DISCHARGE SUMMARY
Radiology Discharge Summary      Hospital Course: No complications    Admit Date: 1/18/2023  Discharge Date: 01/18/2023     Instructions Given to Patient: Yes  Diet: Resume prior diet  Activity: activity as tolerated and no driving for today    Description of Condition on Discharge: Stable  Vital Signs (Most Recent):      Discharge Disposition: Home    Discharge Diagnosis: L renal mass     Follow-up: per Urology    @SIG@

## 2023-01-18 NOTE — H&P
Consult/H&P Note  Interventional Radiology    Consult Requested By: Urology    Reason for Consult: L renal mass    SUBJECTIVE:     Chief Complaint: L renal mass    History of Present Illness: 41 yo F with L renal mass concerning for primary renal neoplasm.     Past Medical History:   Diagnosis Date    Allergy     Obesity 10/31/2015    Seizures     epilepsy     No past surgical history on file.  Family History   Problem Relation Age of Onset    No Known Problems Mother     Heart disease Father     No Known Problems Sister     No Known Problems Brother     No Known Problems Brother     Acne Neg Hx     Eczema Neg Hx     Lupus Neg Hx     Psoriasis Neg Hx     Melanoma Neg Hx     Breast cancer Neg Hx     Colon cancer Neg Hx     Ovarian cancer Neg Hx      Social History     Tobacco Use    Smoking status: Never    Smokeless tobacco: Never   Substance Use Topics    Alcohol use: Never    Drug use: Never       Review of Systems:  Constitutional/General:No fever, chills, change in appetite or weight loss.  Hematological/Immuno: no known coagulopathies  Respiratory: no shortness of breath  Cardiovascular: no chest pain  Gastrointestinal: no abdominal pain  Genito-Urinary: no dysuria  Musculoskeletal: negative  Skin: Negative for rash, itching, pigmentation changes, nail or hair changes.  Neurological: no TIA or stroke symptoms  Psychiatric: normal mood/affect, good insight/judgement      OBJECTIVE:     Vital Signs Range (Last 24H):       Physical Exam:  General- Patient alert and oriented x3 in NAD  ENT- PERRLA,  Neck- No masses  CV- Regular rate and rhythm  Resp-  No increased WOB  GI- Non tender/non-distended  Extrem- No cyanosis, clubbing, edema.   Derm- No rashes, masses, or lesions noted  Neuro-  No focal deficits noted.     Physical Exam  There is no height or weight on file to calculate BMI.    Scheduled Meds:   Continuous Infusions:   PRN Meds:    Allergies:   Review of patient's allergies indicates:   Allergen Reactions     Phenobarbital      lethargy       Labs:  No results for input(s): INR in the last 168 hours.    Invalid input(s):  PT,  PTT  No results for input(s): WBC, HGB, HCT, MCV, PLT in the last 168 hours. No results for input(s): GLU, NA, K, CL, CO2, BUN, CREATININE, CALCIUM, MG, ALT, AST, ALBUMIN, BILITOT, BILIDIR in the last 168 hours.    Vitals (Most Recent):       ASA: 2  Mallampati: 2    Consent obtained per patient's father    ASSESSMENT/PLAN:     Biopsy of L renal mass, moderate sedation.    There are no hospital problems to display for this patient.          Luis Eduardo Nassar MD

## 2023-01-18 NOTE — PROCEDURES
Radiology Post-Procedure Note    Pre Op Diagnosis: L renal mass  Post Op Diagnosis: Same    Procedure: biopsy    Procedure performed by: Luis Eduardo Nassar MD    Written Informed Consent Obtained: Yes  Specimen Removed: YES multiple cores  Estimated Blood Loss: Minimal    Findings:   Biopsy of L renal mass.    Patient tolerated procedure well.    @SIG@

## 2023-01-20 ENCOUNTER — TELEPHONE (OUTPATIENT)
Dept: UROLOGY | Facility: CLINIC | Age: 43
End: 2023-01-20
Payer: MEDICARE

## 2023-01-20 NOTE — TELEPHONE ENCOUNTER
SPOKE TO THE PATIENT'S GUARDIAN TO SCHEDULE UPCOMING APPOINTMENT ON February 3,2023        KE POE      ----- Message from Maxx Thomas MD sent at 1/18/2023  2:33 PM CST -----  Schedule appt with Dr Thomas in 2 weeks to discuss path results please

## 2023-01-23 LAB
ADEQUACY: NORMAL
COMMENT: NORMAL
FINAL PATHOLOGIC DIAGNOSIS: NORMAL
GROSS: NORMAL
Lab: NORMAL

## 2023-02-01 NOTE — PROGRESS NOTES
Subjective:      Malorie Taylor is a 42 y.o. female who returns today regarding her     Small left renal mass    Pt has chronic seizures.    No known VHL, BHD, or Tuberous sclerosis    No complications following biopsy  UA neg today  Some vaginal bleeding this AM      The following portions of the patient's history were reviewed and updated as appropriate: allergies, current medications, past family history, past medical history, past social history, past surgical history and problem list.    Review of Systems  Pertinent items are noted in HPI.  A comprehensive multipoint review of systems was negative except as otherwise stated in the HPI.    Past Medical History:   Diagnosis Date    Allergy     Obesity 10/31/2015    Seizures     epilepsy     Past Surgical History:   Procedure Laterality Date    BIOPSY, WITH CT GUIDANCE Left 1/18/2023    Procedure: RENAL MASS BIOPSY, WITH CT GUIDANCE;  Surgeon: Luis Eduardo Nassar MD;  Location: [facility] CATH LAB;  Service: Radiology;  Laterality: Left;       Review of patient's allergies indicates:   Allergen Reactions    Phenobarbital      lethargy          Objective:   Vitals: There were no vitals taken for this visit.    Physical Exam   General: alert and oriented, no acute distress  Respiratory: Symmetric expansion, non-labored breathing  Cardiovascular: no peripheral edema  Abdomen: soft, non distended  Skin: normal coloration and turgor, no rashes, no suspicious skin lesions noted  Neuro: no gross deficits  Psych: normal judgment and insight, normal mood/affect, and non-anxious    Physical Exam    Lab Review   Urinalysis demonstrates negative for all components    Lab Results   Component Value Date    WBC 5.54 01/10/2023    HGB 12.9 01/10/2023    HCT 38.0 01/10/2023     (H) 01/10/2023     01/10/2023     Lab Results   Component Value Date    CREATININE 0.7 11/07/2022    BUN 10 11/07/2022     Final Pathologic Diagnosis LEFT RENAL MASS, CT-GUIDED BIOPSY WITH  PATHOLOGIST ASSISTED ADEQUACY   (CYTOLOGY AND CELL BLOCK):   Oncocytic neoplasm, suggestive of low-grade oncocytic tumor.   See comment.    Comment: Interp By Ling Gill M.D., Signed on 01/23/2023 at 14:19       Imaging      Assessment and Plan:   Renal oncocytoma, unspecified laterality      RTC 6 months with renal US  Referral Genetics referral due to oncocytoma and history of seizures      Vaginal bleeding  See Gyn re vaginal bleeding

## 2023-02-03 ENCOUNTER — OFFICE VISIT (OUTPATIENT)
Dept: UROLOGY | Facility: CLINIC | Age: 43
End: 2023-02-03
Payer: MEDICARE

## 2023-02-03 VITALS — DIASTOLIC BLOOD PRESSURE: 60 MMHG | HEART RATE: 65 BPM | SYSTOLIC BLOOD PRESSURE: 122 MMHG

## 2023-02-03 DIAGNOSIS — D30.00 RENAL ONCOCYTOMA, UNSPECIFIED LATERALITY: Primary | ICD-10-CM

## 2023-02-03 PROCEDURE — 99214 OFFICE O/P EST MOD 30 MIN: CPT | Mod: S$GLB,,, | Performed by: UROLOGY

## 2023-02-03 PROCEDURE — 99214 PR OFFICE/OUTPT VISIT, EST, LEVL IV, 30-39 MIN: ICD-10-PCS | Mod: S$GLB,,, | Performed by: UROLOGY

## 2023-02-08 ENCOUNTER — TELEPHONE (OUTPATIENT)
Dept: OBSTETRICS AND GYNECOLOGY | Facility: CLINIC | Age: 43
End: 2023-02-08
Payer: MEDICARE

## 2023-02-08 NOTE — TELEPHONE ENCOUNTER
Staff spoke with Mom,  Mom is reporting about 2 months ago Malorie has started with having a second period towards the end of the month - 2 periods not full 5 days.  Pt denies heavy bleeding at either period, but does have cramps.  Mom was not sure if this was hormone related due to age.  Message sent to Dr Nelson for recommendation.

## 2023-02-16 ENCOUNTER — TELEPHONE (OUTPATIENT)
Dept: OBSTETRICS AND GYNECOLOGY | Facility: CLINIC | Age: 43
End: 2023-02-16
Payer: MEDICARE

## 2023-02-16 DIAGNOSIS — N93.9 ABNORMAL UTERINE BLEEDING (AUB): Primary | ICD-10-CM

## 2023-02-16 NOTE — TELEPHONE ENCOUNTER
Pt is reporting Abnormal cycles  Per Dr Nelson:  Pelvic ultrasound if her mom thinks she'll tolerate it     Spoke with Pts Mom, Pt scheduled for pelvic US and message sent to Dr Nelson for medication to help pt relax for appt.

## 2023-02-20 ENCOUNTER — TELEPHONE (OUTPATIENT)
Dept: OBSTETRICS AND GYNECOLOGY | Facility: CLINIC | Age: 43
End: 2023-02-20
Payer: MEDICARE

## 2023-02-20 DIAGNOSIS — F41.9 ANXIETY: ICD-10-CM

## 2023-02-20 RX ORDER — ALPRAZOLAM 0.25 MG/1
1 TABLET ORAL
Qty: 1 TABLET | Refills: 0 | Status: SHIPPED | OUTPATIENT
Start: 2023-02-20 | End: 2024-04-03

## 2023-02-20 RX ORDER — ALPRAZOLAM 0.25 MG/1
0.25 TABLET ORAL
Qty: 1 TABLET | Refills: 0 | Status: SHIPPED | OUTPATIENT
Start: 2023-02-20 | End: 2023-02-20

## 2023-02-20 NOTE — TELEPHONE ENCOUNTER
----- Message from Kalani Babin sent at 2/20/2023  1:56 PM CST -----      Can the clinic reply in [facility]:N        Please refill the medication(s) listed below. Please call the patient when the prescription(s) is ready for  at this phone number         Medication #1 Xanax 1mg    Medication #2       Preferred Pharmacy: [id number]  [address] AT [facility]   Phone:  [phone]  Fax:  [phone]

## 2023-02-20 NOTE — TELEPHONE ENCOUNTER
Outpatient Medication Detail     Disp Refills Start End SUHAIL   ALPRAZolam (XANAX) 0.25 MG tablet 1 tablet 0 2/20/2023 3/22/2023 No   Sig - Route: Take 1 tablet (0.25 mg total) by mouth On call Procedure for Anxiety (prior to GYN exam). - Oral   Sent to pharmacy as: ALPRAZolam (XANAX) 0.25 MG tablet   Class: Normal   Order: [id number]   Date/Time Signed: 2/20/2023 14:01       E-Prescribing Status: Receipt confirmed by pharmacy (2/20/2023  2:01 PM CST)     Pharmacy    [id number] - [place] [address]

## 2023-03-01 ENCOUNTER — TELEPHONE (OUTPATIENT)
Dept: OBSTETRICS AND GYNECOLOGY | Facility: CLINIC | Age: 43
End: 2023-03-01
Payer: MEDICARE

## 2023-03-01 NOTE — TELEPHONE ENCOUNTER
----- Message from Hannah Nelson MD sent at 2/28/2023 11:42 AM CST -----  Think we could do a virtual?    ----- Message -----  From: Kimberly, Rad Results In  Sent: 2/22/2023   3:40 PM CST  To: Hannah Nelson MD

## 2023-03-01 NOTE — TELEPHONE ENCOUNTER
LVM  Per Dr Nelson staff called Mom Ms Emily and offered virtual visit to discuss pts US results.

## 2023-03-02 ENCOUNTER — TELEPHONE (OUTPATIENT)
Dept: OBSTETRICS AND GYNECOLOGY | Facility: CLINIC | Age: 43
End: 2023-03-02
Payer: MEDICARE

## 2023-03-02 NOTE — TELEPHONE ENCOUNTER
Spoke with Ms Pat (MOM) and scheduled follow up US review and AUB appt  Staff offered virtual appt but pt is not signed up for MyChart and declines appt  Pt is asking for phone call instead. Staff advised Mom that Dr Nelson prefers to do virtual or in person appts but will ask regarding phone call appt    Pt's Mom verbalized understanding.

## 2023-03-02 NOTE — TELEPHONE ENCOUNTER
----- Message from Minda Crowder sent at 3/2/2023  9:26 AM CST -----  Pt's mom Nelly called in returning the office call in regards to getting a virtual visit. Mom states she doesn't do zoom , she would rather a phone call. Pls call mom back and advise.

## 2023-03-08 ENCOUNTER — OFFICE VISIT (OUTPATIENT)
Dept: OBSTETRICS AND GYNECOLOGY | Facility: CLINIC | Age: 43
End: 2023-03-08
Attending: OBSTETRICS & GYNECOLOGY
Payer: MEDICARE

## 2023-03-08 VITALS — WEIGHT: 235.69 LBS | BODY MASS INDEX: 39.27 KG/M2 | HEIGHT: 65 IN

## 2023-03-08 DIAGNOSIS — N92.0 EXCESSIVE AND FREQUENT MENSTRUATION WITH REGULAR CYCLE: ICD-10-CM

## 2023-03-08 DIAGNOSIS — N92.6 MENSTRUATION DISTURBANCE: ICD-10-CM

## 2023-03-08 DIAGNOSIS — N93.9 ABNORMAL UTERINE BLEEDING (AUB): Primary | ICD-10-CM

## 2023-03-08 PROCEDURE — 99213 OFFICE O/P EST LOW 20 MIN: CPT | Mod: S$PBB,,, | Performed by: OBSTETRICS & GYNECOLOGY

## 2023-03-08 PROCEDURE — 99999 PR PBB SHADOW E&M-EST. PATIENT-LVL III: ICD-10-PCS | Mod: PBBFAC,,, | Performed by: OBSTETRICS & GYNECOLOGY

## 2023-03-08 PROCEDURE — 99213 OFFICE O/P EST LOW 20 MIN: CPT | Mod: PBBFAC | Performed by: OBSTETRICS & GYNECOLOGY

## 2023-03-08 PROCEDURE — 99213 PR OFFICE/OUTPT VISIT, EST, LEVL III, 20-29 MIN: ICD-10-PCS | Mod: S$PBB,,, | Performed by: OBSTETRICS & GYNECOLOGY

## 2023-03-08 PROCEDURE — 99999 PR PBB SHADOW E&M-EST. PATIENT-LVL III: CPT | Mod: PBBFAC,,, | Performed by: OBSTETRICS & GYNECOLOGY

## 2023-03-08 NOTE — PROGRESS NOTES
HISTORY OF PRESENT ILLNESS:    Malorie Taylor is a 43 y.o. female, , No LMP recorded.,  presents with her mother for ultrasound results.    No hx abnormal pap  2018 last pap.  Attempt at pap last year insufficient.      visit:  Cycles regular every month.  Sometimes has mid-cycle spotting.     Today:  reports bleeding between cycles for last 4 months.  Bleeding is light, lasts a few days, and is about 1-2 weeks after her cycle.  U/S normal    Past Medical History:   Diagnosis Date    Allergy     Obesity 10/31/2015    Seizures     epilepsy       Past Surgical History:   Procedure Laterality Date    BIOPSY, WITH CT GUIDANCE Left 2023    Procedure: RENAL MASS BIOPSY, WITH CT GUIDANCE;  Surgeon: Luis Eduardo Nassar MD;  Location: [facility];  Service: Radiology;  Laterality: Left;       MEDICATIONS AND ALLERGIES:      Current Outpatient Medications:     ALPRAZolam (XANAX) 0.25 MG tablet, Take 4 tablets (1 mg total) by mouth On call Procedure for Anxiety (prior to GYN exam)., Disp: 1 tablet, Rfl: 0    b complex vitamins capsule, Take 1 capsule by mouth once daily., Disp: , Rfl:     carBAMazepine (TEGRETOL XR) 400 MG Tb12, TAKE 1 TABLET(400 MG) BY MOUTH TWICE DAILY, Disp: 180 tablet, Rfl: 3    cranberry 500 mg Cap, Take by mouth., Disp: , Rfl:     cranberry fruit concentrate (AZO CRANBERRY ORAL), Take by mouth., Disp: , Rfl:     docusate sodium (COLACE) 100 MG capsule, Take 100 mg by mouth 2 (two) times daily., Disp: , Rfl:     ferrous sulfate (FEOSOL) 325 mg (65 mg iron) Tab tablet, Take 325 mg by mouth daily with breakfast., Disp: , Rfl:     fish oil-omega-3 fatty acids 300-1,000 mg capsule, Take 1 g by mouth 2 (two) times daily., Disp: , Rfl:     folic acid (FOLVITE) 400 MCG tablet, Take 400 mcg by mouth once daily., Disp: , Rfl:     gabapentin (NEURONTIN) 600 MG tablet, Take 2 tablets (1,200 mg total) by mouth 3 (three) times daily., Disp: 540 tablet, Rfl: 3    inulin (FIBER GUMMIES) 2 gram  Chew, Take by mouth., Disp: , Rfl:     multivitamin capsule, Take 1 capsule by mouth once daily., Disp: , Rfl:     topiramate (TOPAMAX) 200 MG Tab, Take 1 tablet (200 mg total) by mouth 2 (two) times daily., Disp: 180 tablet, Rfl: 3    vitamin D 1000 units Tab, Take 185 mg by mouth once daily., Disp: , Rfl:     VITAMIN E ACETATE ORAL, Take by mouth., Disp: , Rfl:     Review of patient's allergies indicates:   Allergen Reactions    Phenobarbital      lethargy       Family History   Problem Relation Age of Onset    No Known Problems Mother     Heart disease Father     No Known Problems Sister     No Known Problems Brother     No Known Problems Brother     Acne Neg Hx     Eczema Neg Hx     Lupus Neg Hx     Psoriasis Neg Hx     Melanoma Neg Hx     Breast cancer Neg Hx     Colon cancer Neg Hx     Ovarian cancer Neg Hx        Social History     Socioeconomic History    Marital status: Single   Occupational History    Occupation: Housekeeping   Tobacco Use    Smoking status: Never    Smokeless tobacco: Never   Substance and Sexual Activity    Alcohol use: Never    Drug use: Never    Sexual activity: Never     Social Determinants of Health     Financial Resource Strain: Low Risk     Difficulty of Paying Living Expenses: Not hard at all   Food Insecurity: No Food Insecurity    Worried About Running Out of Food in the Last Year: Never true    Ran Out of Food in the Last Year: Never true   Transportation Needs: No Transportation Needs    Lack of Transportation (Medical): No    Lack of Transportation (Non-Medical): No   Physical Activity: Insufficiently Active    Days of Exercise per Week: 3 days    Minutes of Exercise per Session: 10 min   Stress: No Stress Concern Present    Feeling of Stress : Not at all   Social Connections: Moderately Isolated    Frequency of Communication with Friends and Family: More than three times a week    Frequency of Social Gatherings with Friends and Family: More than three times a week    Attends  "Hoahaoism Services: More than 4 times per year    Active Member of Clubs or Organizations: No    Attends Club or Organization Meetings: Never    Marital Status: Never    Housing Stability: Low Risk     Unable to Pay for Housing in the Last Year: No    Number of Places Lived in the Last Year: 1    Unstable Housing in the Last Year: No       ROS:  GENERAL: No weight changes. No swelling. No fatigue. No fever.  CARDIOVASCULAR: No chest pain. No shortness of breath. No leg cramps.   NEUROLOGICAL: No headaches. No vision changes.  BREASTS: No pain. No lumps. No discharge.  ABDOMEN: No pain. No nausea. No vomiting. No diarrhea. No constipation.  REPRODUCTIVE: No abnormal bleeding.   VULVA: No pain. No lesions. No itching.  VAGINA: No relaxation. No itching. No odor. No discharge. No lesions.  URINARY: No incontinence. No nocturia. No frequency. No dysuria.    Ht 5' 5" (1.651 m)   Wt 106.9 kg (235 lb 10.8 oz)   BMI 39.22 kg/m²     PE:  APPEARANCE: Well nourished, well developed, in no acute distress.  PSYCH:  appropriate mood and affect      DIAGNOSIS & PLAN  1. Abnormal uterine bleeding (AUB)  TSH    T4, Free      2. Excessive and frequent menstruation with regular cycle  TSH      3. Menstruation disturbance  T4, Free              COUNSELING:  Patient & her mother counseled.  Likely ovulatory bleeding  Cycle diary  Consider low dose OCPs if becomes erratic or heavy        20 minutes addressing problems.  This includes face to face time and non-face to face time preparing to see the patient (eg, review of tests), Obtaining and/or reviewing separately obtained history, Documenting clinical information in the electronic or other health record, Independently interpreting resultsand communicating results to the patient/family/caregiver, or Care coordination.     "

## 2023-03-30 ENCOUNTER — LAB VISIT (OUTPATIENT)
Dept: LAB | Facility: HOSPITAL | Age: 43
End: 2023-03-30
Payer: MEDICARE

## 2023-03-30 ENCOUNTER — OFFICE VISIT (OUTPATIENT)
Dept: PRIMARY CARE CLINIC | Facility: CLINIC | Age: 43
End: 2023-03-30
Payer: MEDICARE

## 2023-03-30 VITALS
OXYGEN SATURATION: 99 % | HEIGHT: 65 IN | HEART RATE: 96 BPM | SYSTOLIC BLOOD PRESSURE: 112 MMHG | DIASTOLIC BLOOD PRESSURE: 70 MMHG | TEMPERATURE: 98 F | WEIGHT: 244.69 LBS | BODY MASS INDEX: 40.77 KG/M2

## 2023-03-30 DIAGNOSIS — Z79.899 OTHER LONG TERM (CURRENT) DRUG THERAPY: ICD-10-CM

## 2023-03-30 DIAGNOSIS — N92.6 MENSTRUATION DISTURBANCE: ICD-10-CM

## 2023-03-30 DIAGNOSIS — G40.909 SEIZURE DISORDER: ICD-10-CM

## 2023-03-30 DIAGNOSIS — R91.8 MULTIPLE LUNG NODULES: ICD-10-CM

## 2023-03-30 DIAGNOSIS — N28.89 RENAL MASS: Primary | ICD-10-CM

## 2023-03-30 DIAGNOSIS — R79.9 ABNORMAL FINDING OF BLOOD CHEMISTRY, UNSPECIFIED: ICD-10-CM

## 2023-03-30 DIAGNOSIS — N92.0 EXCESSIVE AND FREQUENT MENSTRUATION WITH REGULAR CYCLE: ICD-10-CM

## 2023-03-30 DIAGNOSIS — E66.01 SEVERE OBESITY: ICD-10-CM

## 2023-03-30 DIAGNOSIS — N93.9 ABNORMAL UTERINE BLEEDING (AUB): ICD-10-CM

## 2023-03-30 DIAGNOSIS — N28.89 RENAL MASS: ICD-10-CM

## 2023-03-30 DIAGNOSIS — N92.6 MENSTRUAL IRREGULARITY: ICD-10-CM

## 2023-03-30 LAB
25(OH)D3+25(OH)D2 SERPL-MCNC: 39 NG/ML (ref 30–96)
ANION GAP SERPL CALC-SCNC: 9 MMOL/L (ref 8–16)
BUN SERPL-MCNC: 8 MG/DL (ref 6–20)
CALCIUM SERPL-MCNC: 9.3 MG/DL (ref 8.7–10.5)
CHLORIDE SERPL-SCNC: 103 MMOL/L (ref 95–110)
CHOLEST SERPL-MCNC: 219 MG/DL (ref 120–199)
CHOLEST/HDLC SERPL: 3.9 {RATIO} (ref 2–5)
CO2 SERPL-SCNC: 23 MMOL/L (ref 23–29)
CREAT SERPL-MCNC: 0.7 MG/DL (ref 0.5–1.4)
EST. GFR  (NO RACE VARIABLE): >60 ML/MIN/1.73 M^2
ESTIMATED AVG GLUCOSE: 91 MG/DL (ref 68–131)
GLUCOSE SERPL-MCNC: 89 MG/DL (ref 70–110)
HBA1C MFR BLD: 4.8 % (ref 4–5.6)
HDLC SERPL-MCNC: 56 MG/DL (ref 40–75)
HDLC SERPL: 25.6 % (ref 20–50)
HIV 1+2 AB+HIV1 P24 AG SERPL QL IA: NORMAL
LDLC SERPL CALC-MCNC: 138.2 MG/DL (ref 63–159)
NONHDLC SERPL-MCNC: 163 MG/DL
POTASSIUM SERPL-SCNC: 3.4 MMOL/L (ref 3.5–5.1)
SODIUM SERPL-SCNC: 135 MMOL/L (ref 136–145)
T4 FREE SERPL-MCNC: 0.7 NG/DL (ref 0.71–1.51)
TRIGL SERPL-MCNC: 124 MG/DL (ref 30–150)
TSH SERPL DL<=0.005 MIU/L-ACNC: 2.4 UIU/ML (ref 0.4–4)
VIT B12 SERPL-MCNC: 1307 PG/ML (ref 210–950)

## 2023-03-30 PROCEDURE — 84443 ASSAY THYROID STIM HORMONE: CPT | Performed by: OBSTETRICS & GYNECOLOGY

## 2023-03-30 PROCEDURE — 80048 BASIC METABOLIC PNL TOTAL CA: CPT | Performed by: STUDENT IN AN ORGANIZED HEALTH CARE EDUCATION/TRAINING PROGRAM

## 2023-03-30 PROCEDURE — 99214 OFFICE O/P EST MOD 30 MIN: CPT | Mod: S$PBB,,, | Performed by: STUDENT IN AN ORGANIZED HEALTH CARE EDUCATION/TRAINING PROGRAM

## 2023-03-30 PROCEDURE — 99213 OFFICE O/P EST LOW 20 MIN: CPT | Mod: PBBFAC,PN | Performed by: STUDENT IN AN ORGANIZED HEALTH CARE EDUCATION/TRAINING PROGRAM

## 2023-03-30 PROCEDURE — 87389 HIV-1 AG W/HIV-1&-2 AB AG IA: CPT | Performed by: STUDENT IN AN ORGANIZED HEALTH CARE EDUCATION/TRAINING PROGRAM

## 2023-03-30 PROCEDURE — 82306 VITAMIN D 25 HYDROXY: CPT | Performed by: STUDENT IN AN ORGANIZED HEALTH CARE EDUCATION/TRAINING PROGRAM

## 2023-03-30 PROCEDURE — 84439 ASSAY OF FREE THYROXINE: CPT | Performed by: OBSTETRICS & GYNECOLOGY

## 2023-03-30 PROCEDURE — 99214 PR OFFICE/OUTPT VISIT, EST, LEVL IV, 30-39 MIN: ICD-10-PCS | Mod: S$PBB,,, | Performed by: STUDENT IN AN ORGANIZED HEALTH CARE EDUCATION/TRAINING PROGRAM

## 2023-03-30 PROCEDURE — 82607 VITAMIN B-12: CPT | Performed by: STUDENT IN AN ORGANIZED HEALTH CARE EDUCATION/TRAINING PROGRAM

## 2023-03-30 PROCEDURE — 80061 LIPID PANEL: CPT | Performed by: STUDENT IN AN ORGANIZED HEALTH CARE EDUCATION/TRAINING PROGRAM

## 2023-03-30 PROCEDURE — 36415 COLL VENOUS BLD VENIPUNCTURE: CPT | Mod: PN | Performed by: STUDENT IN AN ORGANIZED HEALTH CARE EDUCATION/TRAINING PROGRAM

## 2023-03-30 PROCEDURE — 99999 PR PBB SHADOW E&M-EST. PATIENT-LVL III: CPT | Mod: PBBFAC,,, | Performed by: STUDENT IN AN ORGANIZED HEALTH CARE EDUCATION/TRAINING PROGRAM

## 2023-03-30 PROCEDURE — 83036 HEMOGLOBIN GLYCOSYLATED A1C: CPT | Performed by: STUDENT IN AN ORGANIZED HEALTH CARE EDUCATION/TRAINING PROGRAM

## 2023-03-30 PROCEDURE — 99999 PR PBB SHADOW E&M-EST. PATIENT-LVL III: ICD-10-PCS | Mod: PBBFAC,,, | Performed by: STUDENT IN AN ORGANIZED HEALTH CARE EDUCATION/TRAINING PROGRAM

## 2023-03-30 RX ORDER — GABAPENTIN 600 MG/1
1200 TABLET ORAL 3 TIMES DAILY
Qty: 540 TABLET | Refills: 3 | Status: SHIPPED | OUTPATIENT
Start: 2023-03-30 | End: 2023-10-13 | Stop reason: SDUPTHER

## 2023-03-30 NOTE — PROGRESS NOTES
Malorie Taylor  1980        Subjective     Chief Complaint: F/u    History of Present Illness:  Ms. Malorie Taylor is a 43 y.o. female who presents to clinic for follow-up.  Here with her mom and dad.  She has been doing well.  Is having some abnormal uterine bleeding.  Following closely with OBGYN.  Pelvic ultrasound done.  Considering OCPs.  Mom thinks might be starting menopause.  Thyroid labs ordered not yet done.  Can do today.    In terms of her renal mass, she is following closely with Urology.  Follow-up ultrasound for 6 months ordered.  Per Urology no, consider genetic testing.  Discussion today.  Family will think about it and let us know.    Doing well in terms for seizures.  Following with Neurology.  Needs refill of gabapentin.  Has not had a seizure in some time.  Discussed possibility of renal mass and seizures as genetic disorder.    Review of Systems   Constitutional:  Negative for chills and fever.   HENT:  Negative for congestion.    Respiratory:  Negative for cough.    Cardiovascular:  Negative for leg swelling.   Gastrointestinal:  Negative for abdominal pain, nausea and vomiting.   Musculoskeletal:  Negative for joint pain.   Neurological:  Negative for speech change and seizures.      PAST HISTORY:     Past Medical History:   Diagnosis Date    Allergy     Obesity 10/31/2015    Seizures     epilepsy       Past Surgical History:   Procedure Laterality Date    BIOPSY, WITH CT GUIDANCE Left 1/18/2023    Procedure: RENAL MASS BIOPSY, WITH CT GUIDANCE;  Surgeon: Luis Eduardo Nassar MD;  Location: [facility] CATH LAB;  Service: Radiology;  Laterality: Left;       Family History   Problem Relation Age of Onset    No Known Problems Mother     Heart disease Father     No Known Problems Sister     No Known Problems Brother     No Known Problems Brother     Acne Neg Hx     Eczema Neg Hx     Lupus Neg Hx     Psoriasis Neg Hx     Melanoma Neg Hx     Breast cancer Neg Hx     Colon cancer Neg Hx      Ovarian cancer Neg Hx        Social History     Socioeconomic History    Marital status: Single   Occupational History    Occupation: Housekeeping   Tobacco Use    Smoking status: Never    Smokeless tobacco: Never   Substance and Sexual Activity    Alcohol use: Never    Drug use: Never    Sexual activity: Never     Social Determinants of Health     Financial Resource Strain: Low Risk     Difficulty of Paying Living Expenses: Not hard at all   Food Insecurity: No Food Insecurity    Worried About Running Out of Food in the Last Year: Never true    Ran Out of Food in the Last Year: Never true   Transportation Needs: No Transportation Needs    Lack of Transportation (Medical): No    Lack of Transportation (Non-Medical): No   Physical Activity: Insufficiently Active    Days of Exercise per Week: 3 days    Minutes of Exercise per Session: 10 min   Stress: No Stress Concern Present    Feeling of Stress : Not at all   Social Connections: Moderately Isolated    Frequency of Communication with Friends and Family: More than three times a week    Frequency of Social Gatherings with Friends and Family: More than three times a week    Attends Moravian Services: More than 4 times per year    Active Member of Clubs or Organizations: No    Attends Club or Organization Meetings: Never    Marital Status: Never    Housing Stability: Low Risk     Unable to Pay for Housing in the Last Year: No    Number of Places Lived in the Last Year: 1    Unstable Housing in the Last Year: No       MEDICATIONS & ALLERGIES:     Current Outpatient Medications on File Prior to Visit   Medication Sig    b complex vitamins capsule Take 1 capsule by mouth once daily.    carBAMazepine (TEGRETOL XR) 400 MG Tb12 TAKE 1 TABLET(400 MG) BY MOUTH TWICE DAILY    cranberry fruit concentrate (AZO CRANBERRY ORAL) Take by mouth.    docusate sodium (COLACE) 100 MG capsule Take 100 mg by mouth 2 (two) times daily.    ferrous sulfate (FEOSOL) 325 mg (65 mg iron)  "Tab tablet Take 325 mg by mouth daily with breakfast.    fish oil-omega-3 fatty acids 300-1,000 mg capsule Take 1 g by mouth 2 (two) times daily.    folic acid (FOLVITE) 400 MCG tablet Take 400 mcg by mouth once daily.    inulin (FIBER GUMMIES) 2 gram Chew Take by mouth.    multivitamin capsule Take 1 capsule by mouth once daily.    topiramate (TOPAMAX) 200 MG Tab Take 1 tablet (200 mg total) by mouth 2 (two) times daily.    vitamin D 1000 units Tab Take 185 mg by mouth once daily.    VITAMIN E ACETATE ORAL Take by mouth.    [DISCONTINUED] gabapentin (NEURONTIN) 600 MG tablet Take 2 tablets (1,200 mg total) by mouth 3 (three) times daily.    ALPRAZolam (XANAX) 0.25 MG tablet Take 4 tablets (1 mg total) by mouth On call Procedure for Anxiety (prior to GYN exam).    cranberry 500 mg Cap Take by mouth.     No current facility-administered medications on file prior to visit.       Review of patient's allergies indicates:   Allergen Reactions    Phenobarbital      lethargy       OBJECTIVE:     Vital Signs:  Vitals:    03/30/23 1326   BP: 112/70   BP Location: Right arm   Patient Position: Sitting   BP Method: Medium (Manual)   Pulse: 96   Temp: 97.9 °F (36.6 °C)   TempSrc: Oral   SpO2: 99%   Weight: 111 kg (244 lb 11.4 oz)   Height: 5' 5" (1.651 m)       Body mass index is 40.72 kg/m².     Physical Exam:  Physical Exam  Vitals and nursing note reviewed. Exam conducted with a chaperone present.   Constitutional:       General: She is not in acute distress.     Appearance: Normal appearance. She is not ill-appearing, toxic-appearing or diaphoretic.   HENT:      Head: Normocephalic and atraumatic.   Eyes:      General: No scleral icterus.     Conjunctiva/sclera: Conjunctivae normal.   Cardiovascular:      Rate and Rhythm: Normal rate and regular rhythm.      Heart sounds: No murmur heard.  Pulmonary:      Effort: Pulmonary effort is normal. No respiratory distress.   Musculoskeletal:      Right lower leg: No edema.      " Left lower leg: No edema.   Skin:     General: Skin is warm and dry.   Neurological:      Mental Status: She is alert. Mental status is at baseline.   Psychiatric:         Mood and Affect: Affect normal.          Laboratory  Lab Results   Component Value Date    WBC 5.54 01/10/2023    HGB 12.9 01/10/2023    HCT 38.0 01/10/2023     (H) 01/10/2023     01/10/2023     Lab Results   Component Value Date    GLU 91 11/07/2022     11/07/2022    K 3.5 11/07/2022     11/07/2022    CO2 26 11/07/2022    BUN 10 11/07/2022    CREATININE 0.7 11/07/2022    CALCIUM 9.5 11/07/2022     Lab Results   Component Value Date    INR 1.0 01/10/2023     Lab Results   Component Value Date    HGBA1C 4.8 10/27/2015           Health Maintenance         Date Due Completion Date    Pneumococcal Vaccines (Age 0-64) (1 - PCV) Never done ---    Hemoglobin A1c (Diabetic Prevention Screening) 10/27/2018 10/27/2015    HIV Screening 02/19/2026 (Originally 3/7/1995) ---    Mammogram 06/01/2023 6/1/2022    Cervical Cancer Screening 01/09/2028 1/9/2023    TETANUS VACCINE 02/01/2028 2/1/2018              ASSESSMENT & PLAN:   Ms. Malorie Taylor is a 43 y.o. female who was seen today in clinic for follow-up.  Annual labs ordered, can have done today.  Will add a BMP.  Can also have thyroid function testing as ordered by a OBGYN.  Working up for abnormal uterine bleeding.  They are following closely with Urology for her renal mass.  Discussion today regarding genetic testing, family will let me know if they would like to proceed does complains referral.  No recent seizures.  Requesting refill on gabapentin.  Has CT chest ordered, can schedule when able.      1. Renal mass  -     BASIC METABOLIC PANEL; Future; Expected date: 03/30/2023    2. Severe obesity  3. Seizure disorder  -     BASIC METABOLIC PANEL; Future; Expected date: 03/30/2023  -     gabapentin (NEURONTIN) 600 MG tablet; Take 2 tablets (1,200 mg total) by mouth 3 (three)  times daily.  Dispense: 540 tablet; Refill: 3    4. Multiple lung nodules  5. Menstrual irregularity             Martita Rico MD  Internal Medicine

## 2023-03-31 ENCOUNTER — TELEPHONE (OUTPATIENT)
Dept: PRIMARY CARE CLINIC | Facility: CLINIC | Age: 43
End: 2023-03-31
Payer: MEDICARE

## 2023-03-31 DIAGNOSIS — G40.909 SEIZURE DISORDER: Primary | ICD-10-CM

## 2023-03-31 DIAGNOSIS — R94.6 ABNORMAL RESULTS OF THYROID FUNCTION STUDIES: ICD-10-CM

## 2023-03-31 NOTE — TELEPHONE ENCOUNTER
----- Message from Martita Rico MD sent at 3/31/2023  8:35 AM CDT -----  Good morning, can we let the patient's mom or dad know her results?    Her cholesterol is a little bit on the high side.  It is better than it was a year ago though which is good.    Some lifestyle changes that can help are increasing exercise, limiting simple carbs/processed snacks/crackers/desserts, limiting red meats and increasing intake of fresh veggies, nuts (like walnuts), avocados, fish (salmon), increasing fiber. If it is still high next time we check it, we can consider starting a cholesterol medication to help.    Her sodium is a touch low which can be related to some of her seizure medications.  I would just make sure she stays hydrated.  She is also little bit low on potassium.  I would encourage her to eat some potassium rich foods, like bananas, sweet potato, spinach, avocados this week and we can recheck in a week if that is okay with you?  It is just low by 0.1 so I do not want to call in a potassium tablet for her since the pill can be very large and I don't want to cause anymore anxiety for her.    Her B12 is a little bit on the high side.  If she is taking any extra B12 supplements she can probably limit this.  Also she would a tiny variation on her thyroid hormone but her stimulating hormone is normal.  Since it is only off by 0.1.  This may just be lab variation.  When we recheck her BMP to make sure her potassium improved I can repeat this as well.

## 2023-03-31 NOTE — PROGRESS NOTES
Good morning, can we let the patient's mom or dad know her results?    Her cholesterol is a little bit on the high side.  It is better than it was a year ago though which is good.    Some lifestyle changes that can help are increasing exercise, limiting simple carbs/processed snacks/crackers/desserts, limiting red meats and increasing intake of fresh veggies, nuts (like walnuts), avocados, fish (salmon), increasing fiber. If it is still high next time we check it, we can consider starting a cholesterol medication to help.    Her sodium is a touch low which can be related to some of her seizure medications.  I would just make sure she stays hydrated.  She is also little bit low on potassium.  I would encourage her to eat some potassium rich foods, like bananas, sweet potato, spinach, avocados this week and we can recheck in a week if that is okay with you?  It is just low by 0.1 so I do not want to call in a potassium tablet for her since the pill can be very large and I don't want to cause anymore anxiety for her.    Her B12 is a little bit on the high side.  If she is taking any extra B12 supplements she can probably limit this.  Also she would a tiny variation on her thyroid hormone but her stimulating hormone is normal.  Since it is only off by 0.1.  This may just be lab variation.  When we recheck her BMP to make sure her potassium improved I can repeat this as well.

## 2023-03-31 NOTE — TELEPHONE ENCOUNTER
Spoke with patient's mother Nelly, relayed result message per Dr. Rico. Nelly will call back and advise of date and time to schedule labs for patient's re-draw.

## 2023-04-10 ENCOUNTER — LAB VISIT (OUTPATIENT)
Dept: LAB | Facility: HOSPITAL | Age: 43
End: 2023-04-10
Attending: STUDENT IN AN ORGANIZED HEALTH CARE EDUCATION/TRAINING PROGRAM
Payer: MEDICARE

## 2023-04-10 DIAGNOSIS — G40.909 SEIZURE DISORDER: ICD-10-CM

## 2023-04-10 DIAGNOSIS — R94.6 ABNORMAL RESULTS OF THYROID FUNCTION STUDIES: ICD-10-CM

## 2023-04-10 LAB
ANION GAP SERPL CALC-SCNC: 10 MMOL/L (ref 8–16)
BUN SERPL-MCNC: 8 MG/DL (ref 6–20)
CALCIUM SERPL-MCNC: 9.3 MG/DL (ref 8.7–10.5)
CHLORIDE SERPL-SCNC: 105 MMOL/L (ref 95–110)
CO2 SERPL-SCNC: 20 MMOL/L (ref 23–29)
CREAT SERPL-MCNC: 0.8 MG/DL (ref 0.5–1.4)
EST. GFR  (NO RACE VARIABLE): >60 ML/MIN/1.73 M^2
GLUCOSE SERPL-MCNC: 105 MG/DL (ref 70–110)
POTASSIUM SERPL-SCNC: 3.7 MMOL/L (ref 3.5–5.1)
SODIUM SERPL-SCNC: 135 MMOL/L (ref 136–145)
TSH SERPL DL<=0.005 MIU/L-ACNC: 1.95 UIU/ML (ref 0.4–4)

## 2023-04-10 PROCEDURE — 36415 COLL VENOUS BLD VENIPUNCTURE: CPT | Mod: PN | Performed by: STUDENT IN AN ORGANIZED HEALTH CARE EDUCATION/TRAINING PROGRAM

## 2023-04-10 PROCEDURE — 84443 ASSAY THYROID STIM HORMONE: CPT | Performed by: STUDENT IN AN ORGANIZED HEALTH CARE EDUCATION/TRAINING PROGRAM

## 2023-04-10 PROCEDURE — 80048 BASIC METABOLIC PNL TOTAL CA: CPT | Performed by: STUDENT IN AN ORGANIZED HEALTH CARE EDUCATION/TRAINING PROGRAM

## 2023-04-11 NOTE — PROGRESS NOTES
Hi, does not look like she has a portal.  Can we let her mom or dad know her sodium is a touch low at 135. This was similar to what it was 12 days ago and similar to what it was 3 years ago.  136  is the lower limit of normal so she is only low by 1 point.  We can just keep an eye on this for now.  Her potassium is back in range.  Thyroid in good range.

## 2023-05-22 ENCOUNTER — TELEPHONE (OUTPATIENT)
Dept: PRIMARY CARE CLINIC | Facility: CLINIC | Age: 43
End: 2023-05-22
Payer: MEDICARE

## 2023-05-22 DIAGNOSIS — Z12.31 ENCOUNTER FOR SCREENING MAMMOGRAM FOR MALIGNANT NEOPLASM OF BREAST: ICD-10-CM

## 2023-05-22 DIAGNOSIS — Z12.39 ENCOUNTER FOR SCREENING FOR MALIGNANT NEOPLASM OF BREAST, UNSPECIFIED SCREENING MODALITY: Primary | ICD-10-CM

## 2023-05-22 NOTE — TELEPHONE ENCOUNTER
----- Message from Nancy Hawkins sent at 5/22/2023 10:41 AM CDT -----  Regarding: orders  Pt is requesting orders for the following a letter for her annual mammo .....Please call and adv @  [phone]

## 2023-07-06 ENCOUNTER — HOSPITAL ENCOUNTER (OUTPATIENT)
Dept: RADIOLOGY | Facility: HOSPITAL | Age: 43
Discharge: HOME | End: 2023-07-06
Attending: STUDENT IN AN ORGANIZED HEALTH CARE EDUCATION/TRAINING PROGRAM
Payer: MEDICARE

## 2023-07-06 DIAGNOSIS — Z12.39 ENCOUNTER FOR SCREENING FOR MALIGNANT NEOPLASM OF BREAST, UNSPECIFIED SCREENING MODALITY: ICD-10-CM

## 2023-07-06 DIAGNOSIS — Z12.31 ENCOUNTER FOR SCREENING MAMMOGRAM FOR MALIGNANT NEOPLASM OF BREAST: ICD-10-CM

## 2023-07-06 PROCEDURE — 77067 SCR MAMMO BI INCL CAD: CPT | Mod: TC

## 2023-07-06 PROCEDURE — 77067 MAMMO DIGITAL SCREENING BILAT WITH TOMO: ICD-10-PCS | Mod: 26,,, | Performed by: RADIOLOGY

## 2023-07-06 PROCEDURE — 77063 MAMMO DIGITAL SCREENING BILAT WITH TOMO: ICD-10-PCS | Mod: 26,,, | Performed by: RADIOLOGY

## 2023-07-06 PROCEDURE — 77067 SCR MAMMO BI INCL CAD: CPT | Mod: 26,,, | Performed by: RADIOLOGY

## 2023-07-06 PROCEDURE — 77063 BREAST TOMOSYNTHESIS BI: CPT | Mod: 26,,, | Performed by: RADIOLOGY

## 2023-07-13 NOTE — PROGRESS NOTES
Your mammogram was read as normal. We advise yearly repeat. Let us know if you notice any suspicious lumps, skin changes, or other breast/armpit abnormalities in the mean time.     Let us know if you have any questions or concerns about these results.

## 2023-08-08 ENCOUNTER — HOSPITAL ENCOUNTER (OUTPATIENT)
Dept: RADIOLOGY | Facility: HOSPITAL | Age: 43
Discharge: HOME | End: 2023-08-08
Attending: UROLOGY
Payer: MEDICARE

## 2023-08-08 DIAGNOSIS — D30.00 RENAL ONCOCYTOMA, UNSPECIFIED LATERALITY: ICD-10-CM

## 2023-08-08 PROCEDURE — 76770 US EXAM ABDO BACK WALL COMP: CPT | Mod: TC

## 2023-08-08 PROCEDURE — 76770 US KIDNEY: ICD-10-PCS | Mod: 26,,, | Performed by: STUDENT IN AN ORGANIZED HEALTH CARE EDUCATION/TRAINING PROGRAM

## 2023-08-08 PROCEDURE — 76770 US EXAM ABDO BACK WALL COMP: CPT | Mod: 26,,, | Performed by: STUDENT IN AN ORGANIZED HEALTH CARE EDUCATION/TRAINING PROGRAM

## 2023-08-09 NOTE — PROGRESS NOTES
Subjective:      Malorie Taylor is a 43 y.o. female who returns today regarding her     Left renal oncocytoma follow up  Biopsies in 1/2023    No  complaints.    The following portions of the patient's history were reviewed and updated as appropriate: allergies, current medications, past family history, past medical history, past social history, past surgical history and problem list.    Review of Systems  Pertinent items are noted in HPI.  A comprehensive multipoint review of systems was negative except as otherwise stated in the HPI.    Past Medical History:   Diagnosis Date    Allergy     Obesity 10/31/2015    Seizures     epilepsy     Past Surgical History:   Procedure Laterality Date    BIOPSY, WITH CT GUIDANCE Left 1/18/2023    Procedure: RENAL MASS BIOPSY, WITH CT GUIDANCE;  Surgeon: Luis Eduardo Nassar MD;  Location: [facility] CATH LAB;  Service: Radiology;  Laterality: Left;       Review of patient's allergies indicates:   Allergen Reactions    Phenobarbital      lethargy          Objective:   Vitals: There were no vitals taken for this visit.    Physical Exam   General: alert and oriented, no acute distress  Respiratory: Symmetric expansion, non-labored breathing  Cardiovascular: no peripheral edema  Abdomen:  non distended  Skin: normal coloration and turgor, no rashes, no suspicious skin lesions noted  Neuro: no gross deficits  Psych: normal judgment and insight, normal mood/affect, and non-anxious    Physical Exam    Lab Review   Urinalysis demonstrates trace blood (pt is on her period) otherwise neg    Lab Results   Component Value Date    WBC 5.54 01/10/2023    HGB 12.9 01/10/2023    HCT 38.0 01/10/2023     (H) 01/10/2023     01/10/2023     Lab Results   Component Value Date    CREATININE 0.8 04/10/2023    BUN 8 04/10/2023     Final Pathologic Diagnosis LEFT RENAL MASS, CT-GUIDED BIOPSY WITH PATHOLOGIST ASSISTED ADEQUACY   (CYTOLOGY AND CELL BLOCK):   Oncocytic neoplasm, suggestive of  low-grade oncocytic tumor.   See comment.    Comment: Interp By Ling Gill M.D., Signed on 01/23/2023 at 14:19       Imaging  US KIDNEY     CLINICAL HISTORY:  Benign neoplasm of unspecified kidney     TECHNIQUE:  Ultrasound of the kidneys was performed including color flow and Doppler evaluation of the kidneys.     COMPARISON:  MRI abdomen without contrast 01/16/2022, CT abdomen without contrast 01/05/2022, U/S abdomen complete 10/21/2022     FINDINGS:  Right kidney: The right kidney measures 11.2 cm. No cortical thinning. No loss of corticomedullary distinction. Resistive index measures 0.70.  No mass. No renal stone. No hydronephrosis.     Left kidney: The left kidney measures 11.3 cm. No cortical thinning. No loss of corticomedullary distinction. Resistive index measures 0.63.  Stable appearance of ovoid isoechoic lesion in the mid left kidney measuring 4.9 x 3.2 x 4.9 cm.  There is a isoechoic lesion in the lateral mid/upper pole measuring 2.0 x 2.0 x 1.6 cm which was not noted on prior studies.  No renal stone. No hydronephrosis.     Splenic resistive index measures 0.70.     Impression:     New isoechoic lesion in the lateral portion of the left kidney.  The previously identified 4.9 cm isoechoic left kidney mass is grossly stable from prior.     In this patient with a history of renal oncocytoma, cannot exclude new neoplastic lesion in the left kidney.  Recommend further assessment with contrast enhanced MRI or CT renal mass protocol.     This report was flagged in Epic as abnormal.     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

## 2023-08-11 ENCOUNTER — OFFICE VISIT (OUTPATIENT)
Dept: UROLOGY | Facility: CLINIC | Age: 43
End: 2023-08-11
Payer: MEDICARE

## 2023-08-11 VITALS
RESPIRATION RATE: 16 BRPM | HEART RATE: 65 BPM | DIASTOLIC BLOOD PRESSURE: 69 MMHG | SYSTOLIC BLOOD PRESSURE: 130 MMHG | HEIGHT: 67 IN | OXYGEN SATURATION: 100 % | WEIGHT: 238.63 LBS | BODY MASS INDEX: 37.45 KG/M2

## 2023-08-11 DIAGNOSIS — D30.00 RENAL ONCOCYTOMA, UNSPECIFIED LATERALITY: Primary | ICD-10-CM

## 2023-08-11 PROCEDURE — 99214 OFFICE O/P EST MOD 30 MIN: CPT | Mod: S$GLB,,, | Performed by: UROLOGY

## 2023-08-11 PROCEDURE — 99214 PR OFFICE/OUTPT VISIT, EST, LEVL IV, 30-39 MIN: ICD-10-PCS | Mod: S$GLB,,, | Performed by: UROLOGY

## 2023-08-11 NOTE — Clinical Note
Juanpablo, can you team see this patient please?  She is 43 years old with multiple renal masses and a history of seizures.  I suspect she may have an undiagnoses syndrome.  Thanks.  Maxx

## 2023-08-16 ENCOUNTER — TELEPHONE (OUTPATIENT)
Dept: HEMATOLOGY/ONCOLOGY | Facility: CLINIC | Age: 43
End: 2023-08-16
Payer: MEDICARE

## 2023-08-16 NOTE — TELEPHONE ENCOUNTER
Cancer genetic consult scheduled 8/24 at 1pm.    ----- Message from Juanpablo Hernandez DNP sent at 8/14/2023  5:27 PM CDT -----  Janette,    Can you please contact pt to schedule with any of us in [facility]?  Please expedite visit given she has multiple renal masses.    Thanks!  Juanpablo  ----- Message -----  From: Maxx Thomas MD  Sent: 8/11/2023   9:37 AM CDT  To: Juanpablo Hernandez DNP; [facility] Staff    Juanpablo, can you team see this patient please?  She is 43 years old with multiple renal masses and a history of seizures.  I suspect she may have an undiagnoses syndrome.  Thanks.  Maxx

## 2023-08-23 ENCOUNTER — OFFICE VISIT (OUTPATIENT)
Dept: PRIMARY CARE CLINIC | Facility: CLINIC | Age: 43
End: 2023-08-23
Payer: MEDICARE

## 2023-08-23 VITALS
WEIGHT: 241.88 LBS | SYSTOLIC BLOOD PRESSURE: 122 MMHG | DIASTOLIC BLOOD PRESSURE: 80 MMHG | OXYGEN SATURATION: 99 % | HEART RATE: 72 BPM | HEIGHT: 67 IN | BODY MASS INDEX: 37.96 KG/M2

## 2023-08-23 DIAGNOSIS — Z00.00 ENCOUNTER FOR PREVENTIVE HEALTH EXAMINATION: Primary | ICD-10-CM

## 2023-08-23 DIAGNOSIS — E66.01 SEVERE OBESITY: ICD-10-CM

## 2023-08-23 DIAGNOSIS — G40.909 SEIZURE DISORDER: ICD-10-CM

## 2023-08-23 PROCEDURE — G0439 PR MEDICARE ANNUAL WELLNESS SUBSEQUENT VISIT: ICD-10-PCS | Mod: ,,,

## 2023-08-23 PROCEDURE — G0439 PPPS, SUBSEQ VISIT: HCPCS | Mod: ,,,

## 2023-08-23 PROCEDURE — 99214 OFFICE O/P EST MOD 30 MIN: CPT | Mod: PBBFAC,PN

## 2023-08-23 PROCEDURE — 99999 PR PBB SHADOW E&M-EST. PATIENT-LVL IV: CPT | Mod: PBBFAC,,,

## 2023-08-23 PROCEDURE — 99999 PR PBB SHADOW E&M-EST. PATIENT-LVL IV: ICD-10-PCS | Mod: PBBFAC,,,

## 2023-08-23 NOTE — PROGRESS NOTES
"  Malorie Taylor presented for a  Medicare AWV and comprehensive Health Risk Assessment today.  She is a patient of Dr. Rico and is new to me.  The following components were reviewed and updated:    Medical history  Family History  Social history  Allergies and Current Medications  Health Risk Assessment  Health Maintenance  Care Team     ** See Completed Assessments for Annual Wellness Visit within the encounter summary.**    The following assessments were completed:  Living Situation  CAGE  Depression Screening  Timed Get Up and Go  Whisper Test  Cognitive Function Screening  Nutrition Screening  ADL Screening  PAQ Screening  Review for opioid screen: pt does not have rx for opioid  Review for substance use disorder:  pt does not use substance          Vitals:    08/23/23 1324   BP: 122/80   BP Location: Right arm   Patient Position: Sitting   BP Method: Medium (Manual)   Pulse: 72   SpO2: 99%   Weight: 109.7 kg (241 lb 13.5 oz)   Height: 5' 7" (1.702 m)     Body mass index is 37.88 kg/m².  Physical Exam  Vitals reviewed.   Constitutional:       Appearance: Normal appearance.   HENT:      Head: Normocephalic and atraumatic.   Cardiovascular:      Rate and Rhythm: Normal rate and regular rhythm.      Pulses: Normal pulses.           Radial pulses are 2+ on the right side and 2+ on the left side.        Dorsalis pedis pulses are 2+ on the right side and 2+ on the left side.        Posterior tibial pulses are 2+ on the right side and 2+ on the left side.      Heart sounds: Normal heart sounds.   Pulmonary:      Effort: Pulmonary effort is normal.      Breath sounds: Normal breath sounds.   Musculoskeletal:      Right lower leg: No edema.      Left lower leg: No edema.   Skin:     General: Skin is warm and dry.      Capillary Refill: Capillary refill takes less than 2 seconds.   Neurological:      General: No focal deficit present.      Mental Status: She is alert and oriented to person, place, and time.   Psychiatric:  "        Mood and Affect: Mood normal.         Behavior: Behavior normal.        Diagnoses and health risks identified today and associated recommendations/orders:    1. Encounter for preventive health examination  - Assessment and evaluation performed as stated above    2. Severe obesity  3. Body mass index (BMI) 40.0-44.9, adult  - stable.  Encouraged pt to increase activity and reduce caloric intake.  Followed by pcp    4. Seizure disorder  - stable on carbamazepine, gabapentin, and topiramate.  Followed by neurology epilepsy.      Provided Malorie with a 5-10 year written screening schedule and personal prevention plan. Recommendations were developed using the USPSTF age appropriate recommendations. Education, counseling, and referrals were provided as needed. After Visit Summary printed and given to patient which includes a list of additional screenings\tests needed.    Follow up in about 1 year (around 8/23/2024), or if symptoms worsen or fail to improve.      Jossy Pavon, SILVIO  I offered to discuss advanced care planning, including how to pick a person who would make decisions for you if you were unable to make them for yourself, called a health care power of , and what kind of decisions you might make such as use of life sustaining treatments such as ventilators and tube feeding when faced with a life limiting illness recorded on a living will that they will need to know. (How you want to be cared for as you near the end of your natural life)     X Patient is interested in learning more about how to make advanced directives.  I provided them paperwork and offered to discuss this with them.

## 2023-08-23 NOTE — PATIENT INSTRUCTIONS
Counseling and Referral of Other Preventative  (Italic type indicates deductible and co-insurance are waived)    Patient Name: Malorie Taylor  Today's Date: 8/23/2023    Health Maintenance       Date Due Completion Date    Pneumococcal Vaccines (Age 0-64) (1 - PCV) Never done ---    COVID-19 Vaccine (6 - Mixed Product series) 12/30/2021 11/4/2021    Influenza Vaccine (1) 09/01/2023 12/8/2022    Override on 4/13/2021: Done    Override on 10/15/2019: Done    Mammogram 07/06/2024 7/6/2023    Hemoglobin A1c (Diabetic Prevention Screening) 03/30/2026 3/30/2023    Cervical Cancer Screening 01/09/2028 1/9/2023    TETANUS VACCINE 02/01/2028 2/1/2018        No orders of the defined types were placed in this encounter.    The following information is provided to all patients.  This information is to help you find resources for any of the problems found today that may be affecting your health:                Living healthy guide: www.[facility].[place].gov      Understanding Diabetes: www.diabetes.org      Eating healthy: www.cdc.gov/healthyweight      [facility] home safety checklist: www.cdc.gov/steadi/patient.html      Agency on Aging: www.goea.[place].gov      Alcoholics anonymous (AA): www.aa.org      Physical Activity: www.paola.nih.gov/rw0okbi      Tobacco use: www.quitwithusla.org

## 2023-08-24 ENCOUNTER — TELEPHONE (OUTPATIENT)
Dept: HEMATOLOGY/ONCOLOGY | Facility: CLINIC | Age: 43
End: 2023-08-24
Payer: MEDICARE

## 2023-08-24 NOTE — TELEPHONE ENCOUNTER
Called to reschedule genetic consult due to [place] being out sick, no answer. Left a detailed message with my direct call back number to reschedule.

## 2023-08-25 ENCOUNTER — HOSPITAL ENCOUNTER (OUTPATIENT)
Dept: RADIOLOGY | Facility: OTHER | Age: 43
Discharge: HOME | End: 2023-08-25
Attending: UROLOGY
Payer: MEDICARE

## 2023-08-25 DIAGNOSIS — D30.00 RENAL ONCOCYTOMA, UNSPECIFIED LATERALITY: ICD-10-CM

## 2023-08-25 PROCEDURE — 74178 CT ABD&PLV WO CNTR FLWD CNTR: CPT | Mod: 26,,, | Performed by: RADIOLOGY

## 2023-08-25 PROCEDURE — 71046 XR CHEST PA AND LATERAL: ICD-10-PCS | Mod: 26,,, | Performed by: RADIOLOGY

## 2023-08-25 PROCEDURE — 74178 CT ABDOMEN PELVIS W WO CONTRAST: ICD-10-PCS | Mod: 26,,, | Performed by: RADIOLOGY

## 2023-08-25 PROCEDURE — 74178 CT ABD&PLV WO CNTR FLWD CNTR: CPT | Mod: TC

## 2023-08-25 PROCEDURE — 71046 X-RAY EXAM CHEST 2 VIEWS: CPT | Mod: 26,,, | Performed by: RADIOLOGY

## 2023-08-25 PROCEDURE — 71046 X-RAY EXAM CHEST 2 VIEWS: CPT | Mod: TC,FY

## 2023-08-25 PROCEDURE — 25500020 PHARM REV CODE 255: Performed by: UROLOGY

## 2023-08-25 RX ADMIN — IOHEXOL 100 ML: 350 INJECTION, SOLUTION INTRAVENOUS at 09:08

## 2023-08-28 ENCOUNTER — OFFICE VISIT (OUTPATIENT)
Dept: INTERNAL MEDICINE | Facility: CLINIC | Age: 43
End: 2023-08-28
Payer: MEDICARE

## 2023-08-28 VITALS
HEART RATE: 74 BPM | SYSTOLIC BLOOD PRESSURE: 112 MMHG | RESPIRATION RATE: 14 BRPM | DIASTOLIC BLOOD PRESSURE: 82 MMHG | OXYGEN SATURATION: 99 % | WEIGHT: 239 LBS | BODY MASS INDEX: 37.51 KG/M2 | HEIGHT: 67 IN

## 2023-08-28 DIAGNOSIS — K64.8 OTHER HEMORRHOIDS: ICD-10-CM

## 2023-08-28 PROBLEM — K64.9 HEMORRHOID: Status: ACTIVE | Noted: 2023-08-28

## 2023-08-28 PROCEDURE — 99214 OFFICE O/P EST MOD 30 MIN: CPT | Mod: PBBFAC | Performed by: STUDENT IN AN ORGANIZED HEALTH CARE EDUCATION/TRAINING PROGRAM

## 2023-08-28 PROCEDURE — 99212 PR OFFICE/OUTPT VISIT, EST, LEVL II, 10-19 MIN: ICD-10-PCS | Mod: S$PBB,,, | Performed by: STUDENT IN AN ORGANIZED HEALTH CARE EDUCATION/TRAINING PROGRAM

## 2023-08-28 PROCEDURE — 99999 PR PBB SHADOW E&M-EST. PATIENT-LVL IV: ICD-10-PCS | Mod: PBBFAC,,, | Performed by: STUDENT IN AN ORGANIZED HEALTH CARE EDUCATION/TRAINING PROGRAM

## 2023-08-28 PROCEDURE — 99999 PR PBB SHADOW E&M-EST. PATIENT-LVL IV: CPT | Mod: PBBFAC,,, | Performed by: STUDENT IN AN ORGANIZED HEALTH CARE EDUCATION/TRAINING PROGRAM

## 2023-08-28 PROCEDURE — 99212 OFFICE O/P EST SF 10 MIN: CPT | Mod: S$PBB,,, | Performed by: STUDENT IN AN ORGANIZED HEALTH CARE EDUCATION/TRAINING PROGRAM

## 2023-08-28 NOTE — PROGRESS NOTES
Subjective     Patient ID: Malorie Taylor is a 43 y.o. female.    Chief Complaint: Hemorrhoids    HPI  Patient is a 44 yo female who is here for hemorrhoids. She complained to her mother about rectal pain on Saturday. She was having spotting before that but they thought maybe she was going thru menopause but now she realized it was hemorrhoids. They tried prep-H for 2 days. Per the mother, hemorrhoid is large and she does not think it will resolve without intervention. Patient admits to rectal pain. Had blood upon wiping on Saturday. pt has chronic constipation and she does strain.     Review of Systems   Constitutional:  Negative for chills and fever.   HENT:  Negative for rhinorrhea and sore throat.    Respiratory:  Negative for cough and chest tightness.    Cardiovascular:  Negative for chest pain.   Gastrointestinal:  Positive for anal bleeding, constipation and rectal pain. Negative for abdominal pain and diarrhea.   Genitourinary:  Negative for dysuria and hematuria.   Neurological:  Negative for dizziness, light-headedness and headaches.          Objective     Physical Exam  Vitals and nursing note reviewed.   Constitutional:       Appearance: Normal appearance.   Eyes:      Conjunctiva/sclera: Conjunctivae normal.   Cardiovascular:      Rate and Rhythm: Normal rate and regular rhythm.   Pulmonary:      Effort: Pulmonary effort is normal. No respiratory distress.   Abdominal:      General: Bowel sounds are normal.   Genitourinary:     Comments: 2 external bleeding hemorrhoids noted on rectal exam  Musculoskeletal:      Cervical back: Normal range of motion.      Right lower leg: No edema.   Skin:     General: Skin is warm.   Neurological:      Mental Status: She is alert.   Psychiatric:         Mood and Affect: Mood normal.         Behavior: Behavior normal.            Assessment and Plan     1. Other hemorrhoids  Assessment & Plan:  Thrombosed external hemorrhoids noted on exam. Bleeding noted. will  refer to the colorectal surgery but asked them to continue the prep H for another 5 days or until seen by surgeon. Can use OTC lidocaine for pain.     Orders:  -     Cancel: Ambulatory referral/consult to Colorectal Surgery; Future; Expected date: 09/04/2023  -     Ambulatory referral/consult to Colorectal Surgery; Future; Expected date: 08/29/2023

## 2023-08-28 NOTE — ASSESSMENT & PLAN NOTE
Thrombosed external hemorrhoids noted on exam. Bleeding noted. will refer to the colorectal surgery but asked them to continue the prep H for another 5 days or until seen by surgeon. Can use OTC lidocaine for pain.

## 2023-08-31 ENCOUNTER — TELEPHONE (OUTPATIENT)
Dept: HEMATOLOGY/ONCOLOGY | Facility: CLINIC | Age: 43
End: 2023-08-31
Payer: MEDICARE

## 2023-08-31 NOTE — PROGRESS NOTES
Subjective:      Malorie Taylor is a 43 y.o. female who returns today regarding her     Left renal oncocytoma follow up  Biopsies in 1/2023     No  complaints.    No known history of VHL or TS or BHD  +history of seizures    Genetics appt had to be rescheduled twice  Now scheduled for next week    The following portions of the patient's history were reviewed and updated as appropriate: allergies, current medications, past family history, past medical history, past social history, past surgical history and problem list.    Review of Systems  Pertinent items are noted in HPI.  A comprehensive multipoint review of systems was negative except as otherwise stated in the HPI.    Past Medical History:   Diagnosis Date    Allergy     Obesity 10/31/2015    Seizures     epilepsy     Past Surgical History:   Procedure Laterality Date    BIOPSY, WITH CT GUIDANCE Left 1/18/2023    Procedure: RENAL MASS BIOPSY, WITH CT GUIDANCE;  Surgeon: Luis Eduardo Nassar MD;  Location: [facility] CATH LAB;  Service: Radiology;  Laterality: Left;       Review of patient's allergies indicates:   Allergen Reactions    Phenobarbital      lethargy          Objective:   Vitals: There were no vitals taken for this visit.    Physical Exam   All counseling    Physical Exam    Lab Review   Urinalysis demonstrates no specimen    Lab Results   Component Value Date    WBC 5.71 08/25/2023    HGB 12.0 08/25/2023    HCT 35.7 (L) 08/25/2023     (H) 08/25/2023     08/25/2023     Lab Results   Component Value Date    CREATININE 0.7 08/25/2023    BUN 12 08/25/2023     Final Pathologic Diagnosis LEFT RENAL MASS, CT-GUIDED BIOPSY WITH PATHOLOGIST ASSISTED ADEQUACY   (CYTOLOGY AND CELL BLOCK):   Oncocytic neoplasm, suggestive of low-grade oncocytic tumor.   See comment.    Comment: Interp By Ling Gill M.D., Signed on 01/23/2023 at 14:19           Imaging    US 8/8/2023  NEW 2CM LEFT LATERAL RENAL MASS             CT ABDOMEN PELVIS W WO CONTRAST      CLINICAL HISTORY:  Left renal oncocytoma central and new left lower pole mass on US;Benign neoplasm of unspecified kidney     TECHNIQUE:  Low dose axial images, sagittal and coronal reformations were obtained from the lung bases to the pubic symphysis before and following the IV administration of 100 mL of Omnipaque 350.     COMPARISON:  Renal ultrasound 08/08/2023; CT abdomen 11/05/2022     FINDINGS:  Again seen are scattered pulmonary micro nodules in both lungs, more numerous at the right lung base.  Largest measures 1.2 cm series 3, image 19, similar compared to prior.     Stable 1.2 cm hypodensity at the hepatic dome which may represent a small hemangioma.  No new liver lesions.  Hepatic and portal veins are patent.     Gallbladder, bile ducts, spleen, pancreas, and adrenal glands are unremarkable.     No hydronephrosis.     LEFT KIDNEY: Heterogeneously enhancing mass along the medial aspect of the left kidney measures 5.1 cm in craniocaudal dimension and 4.2 x 4 cm in greatest transaxial dimension (previously 5 x 3.8 x 3.6 cm when measured in a similar manner).  Several additional hypodense masses most evident to me on delayed images.  The largest is seen along the lateral margin of the kidney measuring approximately 2.2 cm.  In retrospect only, there appears to have been a subtle hypodensity at this location on the prior study measuring 1.2 cm.     RIGHT KIDNEY: At least 1, though probably several hypodensities are seen.  The largest in the lower pole measures 1.6 cm axial series 5, image 79.  There may have been a tiny hypodensity on the prior study at this location measuring 0.7 cm, seen in retrospect only.     Stomach and loops of bowel are normal caliber.  Appendix is normal.  No significant free fluid in the pelvis.  Hypodensity at the uterine fundus measuring 3.2 cm may represent a leiomyoma.     Regional skeleton shows degenerative change.     Impression:     Dominant left renal mass is slightly  increased in size compared to November 2022.  This was previously biopsied and described as an oncocytoma on the pathology report.  I see several hypodense masses in both kidneys, the largest on the left corresponding to the abnormality seen on recent ultrasound.  These may represent additional oncocytomas.  Multiple renal masses can be seen in the setting of sarcoidosis, particularly in this patient who has multiple small pulmonary nodules.  Multiple small renal masses can also be seen in the setting of lymphoma, though I see no evidence for splenomegaly or retroperitoneal lymph node enlargement which I would expect to see in the setting of lymphoma.  According to the electronic medical record, this patient also has a history of seizures.  Genetic testing may be helpful in potentially providing a unifying diagnosis for the seizures, multiple pulmonary nodules, and multiple renal masses if this has not already been performed.     This report was flagged in Epic as abnormal.        Electronically signed by: Miranda Ortiz  Date:                                            08/25/2023  Time:                                           11:23    ON MY REVIEW OF THE CT THE BILATERAL SMALL RENAL MASSES ARE DIFFICULT TO VISUALIZE      Assessment and Plan:   Renal oncocytoma Renal oncocytoma, left 4.9cm now 5.1cm; overall stable  RTC in 6 months with renal US    Bilateral small renal masses  We discussed the natural history of small renal masses, the risk of malignancy and disease progression, and the small risk of mortality.  We discussed the risks and benefits of watchful waiting, biopsy, partial and total nephrectomy.  We discussed the benefits of renal preservation when possible.  We discussed percutaneous, laparoscopic and robotic approaches.  We discussed the management of positive surgical margins.  I answered all questions.  Prefer surveillance    I suspect these may be fetal lobulations rather than true renal masses.   I do not see anything that needs removal at this time       Will discuss case at  oncology conference  Genetics referral schedule 9/6/2023 at [facility]    RTC 6 months with renal US    Pulmonary nodules  See PCP and consider pulm clinic referral

## 2023-08-31 NOTE — TELEPHONE ENCOUNTER
Attempted to r/s appt today as our clinic is without internet connection, no answer. Left a detailed message with my direct call back number.

## 2023-09-01 ENCOUNTER — OFFICE VISIT (OUTPATIENT)
Dept: UROLOGY | Facility: CLINIC | Age: 43
End: 2023-09-01
Payer: MEDICARE

## 2023-09-01 VITALS
HEART RATE: 63 BPM | BODY MASS INDEX: 37.44 KG/M2 | OXYGEN SATURATION: 100 % | DIASTOLIC BLOOD PRESSURE: 72 MMHG | HEIGHT: 67 IN | WEIGHT: 238.56 LBS | SYSTOLIC BLOOD PRESSURE: 126 MMHG

## 2023-09-01 DIAGNOSIS — D30.00 RENAL ONCOCYTOMA, UNSPECIFIED LATERALITY: Primary | ICD-10-CM

## 2023-09-01 DIAGNOSIS — N28.89 LEFT RENAL MASS: ICD-10-CM

## 2023-09-01 PROCEDURE — 99214 OFFICE O/P EST MOD 30 MIN: CPT | Mod: S$GLB,,, | Performed by: UROLOGY

## 2023-09-01 PROCEDURE — 99214 PR OFFICE/OUTPT VISIT, EST, LEVL IV, 30-39 MIN: ICD-10-PCS | Mod: S$GLB,,, | Performed by: UROLOGY

## 2023-09-05 ENCOUNTER — TUMOR BOARD CONFERENCE (OUTPATIENT)
Dept: UROLOGY | Facility: HOSPITAL | Age: 43
End: 2023-09-05
Payer: MEDICARE

## 2023-09-05 ENCOUNTER — TELEPHONE (OUTPATIENT)
Dept: SURGERY | Facility: CLINIC | Age: 43
End: 2023-09-05
Payer: MEDICARE

## 2023-09-05 NOTE — PROGRESS NOTES
[facility]      GENITOURINARY MULTIDISCIPLINARY TUMOR BOARD  PATIENT REVIEW FORM     CLINIC #: [id number]  DATE: 09/05/2023    TUMOR SITE:   Left renal mass    ATTENDING:   Maxx Thomas MD     PATIENT SUMMARY:   Malorie Taylor is a 43 y.o. female with a history of Incidentally discovered left renal mass. Seizures and mental retardation. Posterior hilar mass. Biopsy in 01/2023 called oncocytoma vs low grade oncocytic neoplasm. Put on surveillance. Negligible increase in size of the mass. Radiologist now calling bilateral multifocal renal masses. Patient is planned to see genetics to rule out TSC and Ramila Akanksha Quentin.      DISCUSSION:  Question: Overcall on recent imaging? Any reason to do anything surgically?    PERFORMANCE STATUS:  ECOG 0-1    Estimated GFR/CKD Stage: >60 (Cr 0.7)      FACULTY IN ATTENDANCE:    Urologic Oncology: Boyd Lagos MD; Maxx Thomas MD; Stanley Valenzuela MD, Alejandro Campbell MD, Franko Louise MD    Radiation Oncology: Ramy Coleman MD; Skinny Brown MD    Hematology/Oncology: Aggie Shanks MD; Ladarius Leung MD; Cesar Ho MD    Pathology: Sascha Morgan MD    CONSULT NEEDED:     [] Urologic Oncology    [] Hem/Onc    [] Rad/Onc   []     [] Physical/Occupational Therapy    [] Psychology  [] Other: ___________________    [x] Treatment Guidelines (NCCN and AUA) reviewed and care planned is consistent with guidelines.    PRESENTATION AT CANCER CONFERENCE:         [x] Prospective    [] Retrospective     [] Follow-Up          [] Eligible for clinical trial    TUMOR BOARD RECOMMENDATIONS/PLAN/CONSENSUS:     Case discussed among group. Pathology and radiologic images were reviewed (if applicable).    Agree with full w/u with genetic for TSC and BHD. At this time, the board feels as if surveillance would be appropriate with continued scheduled f/u's. A consideration for a sestamibi scan would be reasonable, though not necessary at this time given that no new tumors were  seen on today's images by the tumor board, despite radiology's comments on her most recent scan.

## 2023-09-06 ENCOUNTER — OFFICE VISIT (OUTPATIENT)
Dept: HEMATOLOGY/ONCOLOGY | Facility: CLINIC | Age: 43
End: 2023-09-06
Payer: MEDICARE

## 2023-09-06 ENCOUNTER — LAB VISIT (OUTPATIENT)
Dept: LAB | Facility: HOSPITAL | Age: 43
End: 2023-09-06
Payer: MEDICARE

## 2023-09-06 DIAGNOSIS — D30.00 RENAL ONCOCYTOMA, UNSPECIFIED LATERALITY: ICD-10-CM

## 2023-09-06 DIAGNOSIS — Z71.83 ENCOUNTER FOR NONPROCREATIVE GENETIC COUNSELING: Primary | ICD-10-CM

## 2023-09-06 PROCEDURE — 99999 PR PBB SHADOW E&M-EST. PATIENT-LVL II: CPT | Mod: PBBFAC,,, | Performed by: NURSE PRACTITIONER

## 2023-09-06 PROCEDURE — 99215 OFFICE O/P EST HI 40 MIN: CPT | Mod: S$PBB,,, | Performed by: NURSE PRACTITIONER

## 2023-09-06 PROCEDURE — 99212 OFFICE O/P EST SF 10 MIN: CPT | Mod: PBBFAC | Performed by: NURSE PRACTITIONER

## 2023-09-06 PROCEDURE — 99999 PR PBB SHADOW E&M-EST. PATIENT-LVL II: ICD-10-PCS | Mod: PBBFAC,,, | Performed by: NURSE PRACTITIONER

## 2023-09-06 PROCEDURE — 36415 COLL VENOUS BLD VENIPUNCTURE: CPT | Performed by: NURSE PRACTITIONER

## 2023-09-06 PROCEDURE — 99215 PR OFFICE/OUTPT VISIT, EST, LEVL V, 40-54 MIN: ICD-10-PCS | Mod: S$PBB,,, | Performed by: NURSE PRACTITIONER

## 2023-09-06 NOTE — Clinical Note
Routed to Cancer Genetics Navigator: - Patient prefers to be contacted for sample collection via:  N/A - Lab order in Epic?  YES - Lab order in portal?  YES - [facility] Order Number: [id number] - Documents uploaded to genetics lab portal:  PEDIGREE - Post-test visit:  10/4/23 @ 2 PM

## 2023-09-06 NOTE — PROGRESS NOTES
"Cancer Genetics  Department of Hematology and Oncology  [facility]  [facility]  [facility]    Date of Service:  23  Visit Provider:  Juanpablo Hernandez DNP  Collaborating Physician:  Alicia Shanks MD    Patient ID  Name: Malorie Taylor    : 1980    MRN: [id number]      Referring Provider  Maxx Thomas MD  [address] [address]    SUBJECTIVE      Chief Complaint: Genetic Evaluation    History of Present Illness (HPI):  Malorie Taylor ("Malorie"), 43 y.o., assigned female sex at birth, is new to the [facility] and to me.  She was referred by Dr. Maxx hTomas ([facility]) for cancer-genetic risk assessment given her diagnosis of renal oncocytoma.    Focused Medical History  Genetic testing:  No  Renal oncocytic tumor, diagnosed at age 42  CT-guided biopsy of left renal mass 23, with pathology indicating oncocytic neoplasm suggestive of low-grade oncocytic tumor  Per GINA Ortiz on 23 CT report:  "I see several hypodense masses in both kidneys, the largest on the left corresponding to the abnormality seen on recent ultrasound.  These may represent additional oncocytomas.  Multiple renal masses can be seen in the setting of sarcoidosis, particularly in this patient who has multiple small pulmonary nodules.  Multiple small renal masses can also be seen in the setting of lymphoma, though I see no evidence for splenomegaly or retroperitoneal lymph node enlargement which I would expect to see in the setting of lymphoma.  According to the electronic medical record, this patient also has a history of seizures."    Colon polyp:  N/A - No history of colonoscopy  Other pertinent lesion:    Kelechi's nevus, skin of left back, s/p shave biopsy 11/18/15  Possible uterine leiomyoma, per 23 CT scan report  Pancreatitis or pancreatic cyst:  " "No  Blood disorder:  No    Focused Surgical History  Reproductive organs:  Intact    Breast Cancer Risk Assessment Questionnaire  Race and ethnicity:  White, Not  or /a  Weight:  238 lb  Height:  5'7"  Mammographic breast density:  almost entirely fatty (7/6/23 mmg, BI-RADS 1)  Age at menarche:  mid- to upper-teens  Age at first live childbirth:  Nulliparous  Menopausal status:  premenopausal  Age at menopause, if applicable:  n/a  Hormone replacement therapy use history:  never  Breast biopsy history and findings:  never  Thoracic radiation therapy history:  never    Ancestry  Ashkenazi Sabianism:  No    Family Oncologic History  Consanguinity:  No  Hereditary cancer genetic testing in blood relatives:  No  Other than noted, no known history of cancer in relatives depicted in the pedigree or in:  maternal extended family, paternal extended family, siblings' descendants.  Other than noted, no known family history of benign tumors/masses/lesions.    ** If this pedigree appears small/illegible on your screen, expand this note window horizontally. **      Review of Systems  See HPI.    Pain noted below of 2/10 severity is secondary to hemorrhoids -- Patient is seeing GI on 9/7/23.     OBJECTIVE     Physical Exam  Very pleasant patient.  Accompanied by her father Dani and mother Nelly, who are also very pleasant.  Vitals signs:  Reviewed:  Vitals:    09/06/23 1155   PainSc:   2   (Pain is rated on scale of 0-10 on which 10=worst.)  Distress score:  Reviewed: (2/10, on scale of 0-10 on which 10=worst).  Constitutional      Appearance:  Appears well developed and well nourished. No distress.   Pulmonary     Effort:  Normal.  Neurological     Mental Status:  Alert.  Psychiatric         Mood and Affect:  Normal.     Thought Content:  Consults with parents for answers to some questions.     Behavior:  Normal.     Judgment:  Consults with parents for answers to some questions.    CANCER GENETIC COUNSELING    "   Cancer Genetics     Germline cancer genetic testing is the testing of genes associated with cancer, known as cancer susceptibility genes.  Just as these genes are inherited from parents--one copy of each gene from each parent--mutations in these genes can be inherited, as well.  A mutation in a cancer susceptibility gene adversely affects the gene's ability to prevent cancer; therefore, carriers of cancer susceptibility gene mutations may be at increased risk for certain cancers.     Causes of Cancer    Only approximately 5%-10% of cancers are caused by an inherited cancer susceptibility gene mutation; rather, the majority of cancers are sporadic.  Causes of sporadic cancers may include environmental risk factors, lifestyle risk factors, and non-modifiable risk factors.  It is important to note that members of a family often share not only their genetics but also other risk factors, including environmental and lifestyle risk factors, so cancers can be familial.     Potential Results of Genetic Testing, and Their Implications     Potential results of genetic testing include positive, negative, and variant of unknown significance (VUS).    Positive:  A positive result indicates the presence of at least one clinically significant gene mutation, and the individual's associated cancer risks vary depending upon the cancer susceptibility gene(s) in which there is/are a mutation(s).  With a positive result, depending upon the specific result and the individual's clinical history, modified risk management may be recommended, including measures for risk reduction and/or surveillance; however, there are not always effective strategies for modified risk management.  Negative:  A negative result indicates that no clinically significant mutations were identified in the gene(s) tested.    VUS:  A VUS indicates that there is not presently enough data for the laboratory to make a determination as to whether the genetic variant is  clinically significant.  VUSs are not typically acted upon clinically.       Genetic Mutation Inheritance     When an individual tests positive for a gene mutation, her first-degree relatives each have a 50% chance of carrying the same mutation, and other, more distant blood relatives can also be at risk of carrying the same mutation.      Genetic Discrimination     Genetic discrimination occurs when individuals are discriminated against on the basis of their genetic information.    The Genetic Information Nondiscrimination Act of 2008 (IDA) is U[place] federal legislation that provides some protections against use of an individual's genetic information by their health insurer and by their employer.      Title I of IDA prohibits most health insurers from utilizing an individual's genetic information to make decisions regarding insurance eligibility or premium charges.  This protection does not apply to health insurance obtained through a job with the , and it may not apply to health insurance obtained through the Federal Employees Health Benefits Plan.    Title II of IDA prohibits covered entities, in many cases, from requesting or requiring the genetic information of employees and applicants and from using said information to make employment decisions.  This protection does not apply to employers with fewer than 15 employees or to the .    IDA does not protect individuals from genetic discrimination by any other type of policy or entity, including but not limited to life insurance, disability insurance, long-term care insurance,  benefits, and Citizen of [place] [employer] [place] benefits.    Genetic Testing Logistics     An outside laboratory would perform the testing after a blood sample is collected at [facility].    There is a potential for the patient to incur out-of-pocket costs related to genetic testing.    One can expect this genetic testing to take approximately three weeks to result.    Post-test  "genetic counseling can be conducted once the genetic testing results are available.     ASSESSMENT / PLAN        ICD-10-CM ICD-9-CM   1. Encounter for nonprocreative genetic counseling  Z71.83 V26.33   2. Renal oncocytoma, unspecified laterality  D30.00 223.0     1. Encounter for nonprocreative genetic counseling  Malorie Taylor ("Malorie"), 43 y.o., assigned female sex at birth, is new to the [facility] and to me.  She was referred by Dr. Maxx Thomas ([facility]) for cancer-genetic risk assessment given her diagnosis of renal oncocytoma.  Cancer-genetic risk assessment and pre-test cancer genetic counseling were conducted.      From a clinical standpoint, regarding hereditary cancer susceptibility gene testing, we can consider testing [place] kidney cancer susceptibility genes.  The cancer-related history in Malorie's relatives is not overtly suggestive of a hereditary cancer syndrome.        Offered options of proceeding with hereditary cancer susceptibility gene testing at this time versus delaying/declining such, and testing today was elected.  Provided germline cancer genetic test options of focused panel versus broad panel, and the former -- ie, focused panel to include kidney cancer susceptibility genes and lung cancer susceptibility genes -- was elected.    Genetic Test Information:  Testing lab: [facility]   Test panel: CustomNext: Cancer® +RNAinsight® (9510-R)    Primary panel:  FLCN, TSC1, TSC2    Other genes:  BAP1, EGFR, EPCAM, FH, MET, MITF, MLH1, MSH2, MSH6, PMS2 PTEN, SDHA, SDHB, SDHC, SDHD, STK11, TP53, VHL   ICD-10 code(s): D30.00   Verbal informed consent: Obtained   Written informed consent: Obtained   Specimen type: Blood  (Patient denies blood disorders that would necessitate a skin fibroblast specimen)   Specimen collection by: [facility]   Specimen collection date: 9/6/23   Results expected by: Approximately 2-3 weeks after the " genetic testing lab receives the specimen   Post-test genetic counseling: ~4 weeks after sample collection       2. Renal oncocytoma, unspecified laterality  - Ambulatory referral/consult to Genetics:  COMPLETED 9/6/23  - Genetic Misc Sendout Test, Blood; Future     Questions were encouraged and answered to the patient's satisfaction, and she expressed agreement with the plan.   Questions were encouraged and answered to the patient's parents' satisfaction, and they expressed agreement with the plan.       The adult patient does not have a HCPOA and presented with her father, Dani Taylor, as the patients next of kin. Mr. Taylor signed the consent form for the patient's genetic testing and represented he was the guardian. In light of the fact that the proof of the guardianship was not available at the time of the consultation, the patient signed the consent form for her genetic testing, as well. The father initially represented that there was a document regarding this matter, which may have been lost, but after the visit, via telephone, explained he was referring to social security documentation. We have requested HCPOA completion.    Approximately 49 minutes were spent face-to-face with the patient.  Approximately 67 minutes in total were spent on this encounter, which includes face-to-face time and non-face-to-face time preparing to see the patient (e.g., review of tests), obtaining and/or reviewing separately obtained history, documenting clinical information in the electronic or other health record, independently interpreting results (not separately reported) and communicating results to the patient/family/caregiver, or care coordination (not separately reported).     [Update 9/11/23:  Asked Cancer Genetics Navigator to contact patient's father with HPOA form that they can come  vs we can mail to them.]        Juanpablo Hernandez, DNP, APRN, FNP-BC, AOCNP, CGRA  Nurse Practitioner, Cancer Genetics  Department of  Hematology and Oncology  [facility]  [facility], [facility]        CC:  Dr. Maxx Thomas         Routed to Cancer Genetics Navigator:  - Patient prefers to be contacted for sample collection via:  N/A  - Lab order in Epic?  YES  - Lab order in portal?  YES - [facility] Order Number: [id number]  - Documents uploaded to genetics lab portal:  PEDIGREE  - Post-test visit:  10/4/23 @ 2 PM

## 2023-09-07 ENCOUNTER — OFFICE VISIT (OUTPATIENT)
Dept: SURGERY | Facility: CLINIC | Age: 43
End: 2023-09-07
Payer: MEDICARE

## 2023-09-07 VITALS
WEIGHT: 235.88 LBS | HEIGHT: 67 IN | SYSTOLIC BLOOD PRESSURE: 125 MMHG | DIASTOLIC BLOOD PRESSURE: 68 MMHG | BODY MASS INDEX: 37.02 KG/M2 | RESPIRATION RATE: 16 BRPM | HEART RATE: 66 BPM

## 2023-09-07 DIAGNOSIS — K64.8 OTHER HEMORRHOIDS: ICD-10-CM

## 2023-09-07 PROCEDURE — 99999 PR PBB SHADOW E&M-EST. PATIENT-LVL V: CPT | Mod: PBBFAC,,, | Performed by: NURSE PRACTITIONER

## 2023-09-07 PROCEDURE — 99214 OFFICE O/P EST MOD 30 MIN: CPT | Mod: S$PBB,ICN,, | Performed by: NURSE PRACTITIONER

## 2023-09-07 PROCEDURE — 99999 PR PBB SHADOW E&M-EST. PATIENT-LVL V: ICD-10-PCS | Mod: PBBFAC,,, | Performed by: NURSE PRACTITIONER

## 2023-09-07 PROCEDURE — 99214 PR OFFICE/OUTPT VISIT, EST, LEVL IV, 30-39 MIN: ICD-10-PCS | Mod: S$PBB,ICN,, | Performed by: NURSE PRACTITIONER

## 2023-09-07 PROCEDURE — 99215 OFFICE O/P EST HI 40 MIN: CPT | Mod: PBBFAC | Performed by: NURSE PRACTITIONER

## 2023-09-07 RX ORDER — HYDROCORTISONE 25 MG/G
CREAM TOPICAL 2 TIMES DAILY
Qty: 28 G | Refills: 1 | Status: SHIPPED | OUTPATIENT
Start: 2023-09-07

## 2023-09-07 NOTE — PROGRESS NOTES
CRS Office Visit History and Physical    Referring Md:   Stephanie López Md  [address] [place] [address]    SUBJECTIVE:     Chief Complaint: hemorrhoids    History of Present Illness:  The patient is new patient to this practice.   Course is as follows:  Patient is a 43 y.o. female presents with hemorrhoids. On 8/28/23 saw barb Agustin w thrombosed hemorrhoids and recommended CRS f/u, prep H and lidocaine.   Today pain is nearly resolved  Bleeding has stopped  At onset pain was severe  Chronic constipation, strains and sits on toilet > 5 mins at a time. Takes fiber gummies and colace. Had a Hard BM QOD    Last Colonoscopy: never  Family history of colorectal cancer or IBD: no.    Review of patient's allergies indicates:   Allergen Reactions    Phenobarbital      lethargy       Past Medical History:   Diagnosis Date    Allergy     Obesity 10/31/2015    Seizures     epilepsy     Past Surgical History:   Procedure Laterality Date    BIOPSY, WITH CT GUIDANCE Left 1/18/2023    Procedure: RENAL MASS BIOPSY, WITH CT GUIDANCE;  Surgeon: Luis Eduardo Nassar MD;  Location: [facility] CATH LAB;  Service: Radiology;  Laterality: Left;     Family History   Problem Relation Age of Onset    Colonic polyp Mother         a couple in the '80s    Heart disease Father     Macular degeneration Maternal Grandmother         WET - AMD    Hyperlipidemia Maternal Grandmother         later in life    Insomnia Maternal Grandmother     Hypertension Maternal Grandmother     Glaucoma Maternal Grandmother     Dementia Maternal Grandmother     Alzheimer's disease Maternal Grandmother     Heart disease Maternal Grandmother     Other Maternal Grandmother         benign breast tumor    Macular degeneration Maternal Grandfather         DRY - AMD    Vision loss Maternal Grandfather         corneal guttata (thin)    Hyperlipidemia Maternal Grandfather         later in life    Alzheimer's disease Maternal Grandfather     Hypertension  "Maternal Grandfather     Heart disease Paternal Grandmother     Emphysema Paternal Grandmother     Cervical cancer Paternal Grandmother     Diabetes Other         insulin-dependent    Breast cancer Other         age at dx unk    Lung cancer Other     Insomnia Maternal Aunt     Glaucoma Maternal Aunt     Coronary artery disease Maternal Aunt     Sleep apnea Maternal Uncle     Macular degeneration Maternal Uncle     Epilepsy Other     Tuberculosis Other     Heart attack Other     Heart attack Other     Heart disease Other     Glaucoma Other     Heart disease Other     Heart attack Other     Macular degeneration Other     Diverticulitis Other     Diabetes Other     Breast cancer Other 68    Heart disease Other     Kidney disease Other     Dementia Other     Heart disease Other     Heart disease Other     Acne Neg Hx     Eczema Neg Hx     Lupus Neg Hx     Psoriasis Neg Hx     Melanoma Neg Hx     Colon cancer Neg Hx     Ovarian cancer Neg Hx      Social History     Tobacco Use    Smoking status: Never    Smokeless tobacco: Never   Substance Use Topics    Alcohol use: Never    Drug use: Never        Review of Systems:  Review of Systems   Constitutional:  Negative for chills, fever and weight loss.       OBJECTIVE:     Vital Signs (Most Recent)  /68 (BP Location: Left arm, Patient Position: Sitting, BP Method: X-Large (Automatic))   Pulse 66   Resp 16   Ht 5' 7" (1.702 m)   Wt 107 kg (235 lb 14.3 oz)   BMI 36.95 kg/m²     Physical Exam:  General: White female in no distress   Neuro: Alert and oriented to person, place, and time.  Moves all extremities.     HEENT: No icterus.  Trachea midline  Respiratory: Respirations are even and unlabored, no cough or audible wheezing  Skin: Warm dry and intact, No visible rashes, no jaundice    Labs reviewed today:  Lab Results   Component Value Date    WBC 5.71 08/25/2023    HGB 12.0 08/25/2023    HCT 35.7 (L) 08/25/2023     08/25/2023    CHOL 219 (H) 03/30/2023    " TRIG 124 03/30/2023    HDL 56 03/30/2023    ALT 17 08/25/2023    AST 17 08/25/2023     (L) 08/25/2023    K 3.9 08/25/2023     08/25/2023    CREATININE 0.7 08/25/2023    BUN 12 08/25/2023    CO2 23 08/25/2023    TSH 1.949 04/10/2023    INR 1.0 01/10/2023    HGBA1C 4.8 03/30/2023       Anorectal Exam:    Anal Skin: midline anterior R and L almost rsolved hemorrhoids, no visible clot, non TTP    Digital Rectal Exam:  deferred      ASSESSMENT/PLAN:     Diagnoses and all orders for this visit:    Other hemorrhoids  -     Ambulatory referral/consult to Colorectal Surgery  -     hydrocortisone (ANUSOL-HC) 2.5 % rectal cream; Place rectally 2 (two) times daily.    43 y.o. F here with mother for thrombosed hemorrhoids that started 8/26. Nearly resolved at this point, now with tags. We discussed all available options for hemorrhoids. Since constipation not controlled, will focus on changing regimen to prevent these from being an issue in the future. But if they would like anything removed in the future will need to see MD    The patient was instructed to:  Start fiber supplement such as citrucel or Metamucil every day, increase as tolerated per  instructions   Miralax 1-2 capfuls daily  Water intake of 64 oz per day  May use prescribed cortisone cream to hemorrhoids twice a day not to exceed more than 2 weeks at a time. Take 2 week medication vacation holiday then resume as needed.  Warm sitz baths as needed  Do not sit on the toilet for more than 5 mins at a time     MICHELLE Phillips  Colon and Rectal Surgery

## 2023-09-07 NOTE — PATIENT INSTRUCTIONS
Start fiber supplement such as citrucel or Metamucil every day, increase as tolerated per  instructions   Miralax 1-2 capfuls daily  Water intake of 64 oz per day  May use prescribed cortisone cream to hemorrhoids twice a day not to exceed more than 2 weeks at a time. Take 2 week medication vacation holiday then resume as needed.  Warm sitz baths as needed  Do not sit on the toilet for more than 5 mins at a time

## 2023-09-14 ENCOUNTER — TELEPHONE (OUTPATIENT)
Dept: HEMATOLOGY/ONCOLOGY | Facility: CLINIC | Age: 43
End: 2023-09-14
Payer: MEDICARE

## 2023-09-14 NOTE — TELEPHONE ENCOUNTER
Spoke with Ms. Diaz to see if she would like to  the POA form or if she would prefer it sent in the mail. She requested it be mailed. Put in mail today.

## 2023-10-02 LAB
GENETIC COUNSELING?: YES
GENSO SPECIMEN TYPE: NORMAL
MISCELLANEOUS GENETIC TEST NAME: NORMAL
PARTENTAL OR SIBLING TESTING?: NO
REFERENCE LAB: NORMAL
TEST RESULT: NORMAL

## 2023-10-03 NOTE — PROGRESS NOTES
"Cancer Genetics  Hereditary and High-Risk Clinic  Department of Hematology and Oncology  [facility]    [facility]    Date of Service:  10/4/23  Visit Provider:  Juanpablo Hernandez DNP  Collaborating Physician:  Alicia Shanks MD    Patient ID  Name: Malorie Taylor    : 1980    MRN: [id number]      SUBJECTIVE      Chief Complaint: Results    History of Present Illness (HPI):  Malorie Taylor ("Malorie"), 43 y.o., assigned female sex at birth, initially presented on 23 for cancer-genetic risk assessment (upon referral by Maxx Thomas MD, [facility]) and subsequently underwent hereditary cancer genetic testing.  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  She returns today for post-test genetic counseling.    Mother recounts that at age 6/when Malorie was in 1st grade is the point at which any sign of learning disability or seizure disorder began.  While at [employer], all of a sudden Malorie got a "faraway look" in her eyes while eating cereal and flipped the cereal bowl upside-down; after this presumed seizure activity concluded, Malorie was fine for the remainder of the [employer] trip.  Shortly thereafter, back home, Malorie's mother walked into Malorie's bedroom and noted Malorie convulsing, and at that point Malorie's siblings told their mother than Malorie does that "all the time."  As such, Malorie was taken to [facility] and underwent scans.  Around the same time as all of this, Malorie's 1st- told her parents that some days Malorie would do great with her class work while other days she wouldn't understand material she had previously understood.  Malorie was diagnosed with a "learning disability" that was not further labeled, according to her parents.    Mother states that Malorie met her developmental milestones until age 6 years, and her parents were told that Malorie's mental capacity would be that of an 8- to " "10-year-old.  Malorie's parents have not noted any regression in her mental capacity.    For seizures, Malorie takes gabapentin, topiramate, and carbamazepine; she has most recently seen Nancy James, PhD, and NATAN Al, with [facility].  According to Malorie's parents, an attempt at weaning her seizure medication years ago "did not go well" - seizures recurred.  Malorie's last known seizure, according to her parents, was in her late teens or early 20s.  She has not had a recent EEG or brain imaging.    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  These may represent additional oncocytomas.  Multiple renal masses can be seen in the setting of sarcoidosis, particularly in this patient who has multiple small pulmonary nodules.  Multiple small renal masses can also be seen in the setting of lymphoma, though I see no evidence for splenomegaly or retroperitoneal lymph node enlargement which I would expect to see in the setting of lymphoma.  According to the electronic medical record, this patient also has a history of seizures."     Colon polyp:  N/A - No history of colonoscopy  Other pertinent lesion:    Kelechi's nevus, skin of left back, s/p shave biopsy 11/18/15  Possible uterine leiomyoma, per 8/25/23 CT scan report  Pancreatitis or pancreatic cyst:  No  Blood disorder:  No     Focused Surgical History  Reproductive organs:  Intact     Breast Cancer Risk Assessment Questionnaire  Race and ethnicity:  White, Not  or /a  Weight:  237 lb  Height:  5'7"  Mammographic breast density:  almost entirely fatty (7/6/23 mmg, BI-RADS 1)  Age at menarche:  mid- to upper-teens  Age at first live childbirth:  " "Nulliparous  Menopausal status:  premenopausal  Age at menopause, if applicable:  n/a  Hormone replacement therapy use history:  never  Breast biopsy history and findings:  never  Thoracic radiation therapy history:  never     Ancestry  Ashkenazi Lutheran:  No     Family Oncologic History  Consanguinity:  No  Hereditary cancer genetic testing in blood relatives:  No  Other than noted, no known history of cancer in relatives depicted in the pedigree or in:  maternal extended family, paternal extended family, siblings' descendants.  Other than noted, no known family history of benign tumors/masses/lesions.  Pedigree:  ** If this pedigree appears small/illegible on your screen, expand this note window horizontally. **          Review of Systems  See HPI.    HEENT:  Wears eyeglasses to work on her [employer] books.  Parents deny history of other eye problem in patient.  Pulmonary:  Patient denies experiencing shortness of breath or difficulty breathing.  Parents deny having ever noticed hemoptysis from the patient.  Integumentary:  Lesion on skin of left-middle upper-middle back.  Lesion on skin of left antecubital.  Mother states that patient has a history of toenail fungus and has thick toenails.  Mother denies presence of any other lesions on patient's skin and denies presence of ungual growths.  Psychiatric:  A disrupted routine can lead to anxiety in patient, per parents.  Patient becomes vocally "loud"er when she has her monthly menstrual period.  Parents state patients has no history of self-injury and no history of aggression toward others.     OBJECTIVE     Physical Exam  Very pleasant patient.  Accompanied by her father Dani and mother Nelly, who are also very pleasant.  Vitals signs:  Reviewed:  Vitals:    10/04/23 1359   BP: 132/65   Pulse: 73   SpO2: 99%   PainSc: 0-No pain   (Pain is rated on scale of 0-10 on which 10=worst.)  Distress score:  Reviewed: (0/10, on scale of 0-10 on which " "10=worst).  Constitutional      Appearance:  Appears well developed and well nourished. No distress.   Pulmonary     Effort:  Normal.  Neurological     Mental Status:  Alert and oriented.  Psychiatric         Mood:  Normal.     Behavior:  Normal.     Judgment:  No abnormality observed.  Integumentary     Skin tag to left-middle upper-middle back, smaller than a pencil eraser.       Skin tag to left antecubital, slightly larger than a pinpoint.     No ungual growths observed on any of the 10 fingernails.     Teeth visualized (front teeth) possibly with some dental pits.      Gene Variant Review    NM_[id number](TSC1):c.2599C>T ([id number])  Interpretation:  Pathogenic    Reference:  National Center for Biotechnology Information. ClinVar; [RFQ021248995.4], https://www.ncbi.nlm.nih.gov/clinvar/variation/BMY650344055.4 (accessed Oct. 11, 2023).    ASSESSMENT / PLAN      Genetic Test Summary and Results     Patient Name:  Malorie Taylor ("Malorie")    :  1980    MRN:  [id number]     Genetic Test Results Report:  This is a summary of the report.  The complete report can be found in the patient's medical record.  Proband:  Malorie Taylor ( 1980)  Ordering Provider:  Juanpablo Hernandez DNP   Test Name:  [facility] +RNAinsight: Analyses of Selected Hereditary Cancer Genes  Genetics Lab:  [facility]  Specimen Type:  Blood  Collection Date:  2023  Report Date:  2023 ([facility] Accession #: [id number])     Variants Reported:   TSC1 pathogenic mutation c.2599C>T (p.Q867*), heterozygous   Genes Analyzed: (21 total): BAP1, FH, FLCN, MET, MLH1, MSH2, MSH6, PMS2, PTEN, SDHA, SDHB, SDHC, SDHD, STK11, TP53, TSC1, TSC2, and VHL  (sequencing and deletion/duplication); EGFR and MITF (sequencing only); EPCAM (deletion/duplication only)       Tuberous Sclerosis Complex (TSC)    Clinical features:  Tuberous sclerosis complex (TSC) is a syndrome that mainly involves abnormalities of the skin, brain, kidney, heart, and " lungs, but any organ system can be involved.  Among different families, and within any one given family, there is variability in clinical findings.  Females tend to have milder disease than males.  Central nervous system (CNS) tumors are the leading cause of morbidity and mortality, and renal disease is second.    Diagnosis:  Diagnosis of TSC is established in a person with any of the following:  Identification of one pathogenic mutation in TSC1 or TSC2 on genetic testing; or  Two major clinical features*; or  One major clinical feature and two or more minor features*.    Major and minor clinical features of TSC can be found in scientific literature.    Mode of inheritance:  TSC is inherited in an autosomal-dominant manner, meaning that, while individuals normally have two copies of their genes, only one copy of the gene (either the TSC1 gene or the TSC2 gene) needs to harbor a mutation in order for TSC to develop.  It is now believed that 100% of individuals with a TSC1 or TSC2 gene mutation will have TSC.  While many autosomal-dominantly inherited syndromes are inherited from a parent, two-thirds individuals with TSC have TSC as the result of a de drea mutation, meaning the TSC1 or TSC2 gene mutation occurred for the first time in the family in the individual with TSC.  Only one third of individuals with TSC have a parent with TSC.  When neither parent of a proband with TSC has the pathogenic mutation or clinical evidence of the disorder, the TSC1 or TSC2 pathogenic mutation is likely de drea; however, non-medical explanations including alternate paternity or maternity (e.g., with assisted reproduction) and undisclosed adoption could also be explored.  Parents of the proband:  If the mutation is not identified in leukocyte DNA of either parent, two possible explanations are A) a de drea mutation in the proband or B) germline mosaicism in one parent.  If the parent is the individual in whom the mutation first  occurred, the parent may have somatic mosaicism of the mutation and be minimally/mildly affected.  Siblings of the proband:  Their risk depends on the mutation statuses of the parents:  If a parent of the proband has the TSC1 or TSC2 gene mutation, each of the proband's siblings has a 50% chance of also having the mutation.  On the other hand, if the proband's mutation cannot be detected in the leukocyte DNA of either parent, the chance of the siblings' carrying the mutation is low (~1%-2%) but greater than that of the general population because of the possibility of germline mosaicism.  Offspring of the proband:  Each child of the proband has a 50% chance of inheriting the TSC1 or TSC2 gene mutation.  Other relatives of the proband:  If a parent of the proband has inherited the familial mutation, that parent's relatives may be at risk.  If a sibling or a child of the proband has inherited the familial mutation, that sibling's or child's children each have a 50% chance of inheriting the mutation.    Genotype-phenotype correlations:  TSC2 mutations produce a more severe phenotype than TSC1 mutations.  Individuals with a TSC2 mutation are at higher risk for the following, compared to individuals with a TSC1 mutation:  Renal malignancy, intellectual disability, autistic disorder, low IQ, and infantile spasms.    TSC features and management:  Specific TSC features and TSC management, including surveillance, treatment, and counseling, are described in detail below,  by organ system.      Alerts:  Avoid Smoking, estrogen use, and nephrectomy.  If the individual is at increased risk of developing pheochromocytoma, screen blood/urine for pheochromocytoma in the preoperative period.        Genetics     Managing Provider: CANCER GENETICS:  Juanpablo Hernandez DNP, [facility] [place], for Cancer Genetics    REPRODUCTIVE GENETICS:  A referral to a specialist would be needed   Ongoing Management   Patient-  Specific Plan   Reproductive Implications  The optimal time for determination of genetic risk and discussion of the availability of prenatal/preimplantation genetic testing is before pregnancy  Preimplantation genetic testing and diagnosis (with assisted reproduction, such as in vitro fertilization) are possible    Fetal Implications  Prenatal testing can be done for an existing pregnancy in which the fetus it at risk of having TSC based on the presence of a TSC1 or TSC2 gene mutation in the family  Fetal MRI for tumors may be of use for fetuses at 50% risk of inheriting the familial mutation; of note, cardiac tumors are generally not detected until the third trimester    Risks to Relatives  Offer genetic counseling and testing for family  A TSC1 Family Notification Letter is being mailed to Malorie          Brain     Managing Provider: Sly Stein II, Ph.D., [facility]     Central nervous system (CNS) features of TSC (observed in this approximate percentage of individuals with TSC):  Subependymal nodules (80%) -- These may enlarge, causing pressure and obstruction, and resulting in significant morbidity and mortality  Cortical tubers (90%)  Subependymal giant cell astrocytoma (5%-15%)  Seizures (>80%)  Infantile spasms  Developmental delay or intellectual disability (?50%)    32.5% of premature deaths among individuals with TSC is caused by complication of severe intellectual disability (e.g., status epilepticus and bronchopneumonia)   Upon Initial Diagnosis/Suspicion of TSC Ongoing Management Patient- Specific Plan   During infancy, educate infant's caregivers to recognize infantile spasms and focal seizures    Obtain baseline routine EEG while awake and asleep; if abnormal, especially if features of TSC-associated neuropsychiatric disorder (TAND) are also present, follow-up with 8- to 24-hour video-EEG to assess for seizure activity    Obtain brain MRI to assess for the presence of tubers, SEN,  migrational defects, and SEGA EEG  Obtain routine EEG in asymptomatic infants with TSC every 6 weeks up to age 12 months and every 3 months up to age 24 months, as abnormal EEG frequently precedes onset of clinical seizures  Obtain routine EEG in individuals with known or suspected seizure activity; the frequency of routine EEG should be determined by clinical need rather than a specific defined interval  Prolonged video-EEG, 24 hours or longer, is appropriate when seizure occurrence is unclear or when unexplained sleep, behavioral changes, or other alteration in cognitive or neurological function is present    Medical Management of Infantile Spasms  Vigabatrin is the recommended first-line therapy for infantile spasms; ACTH, synthetic ACTH, or prednisolone can be used if treatment with full-dose vigabatrin for 2 weeks has not correlated with clinical and EEG improvement    Medical/Surgical Management of Seizures  Other than infantile spasms, antiseizure medications for other seizure types in TSC should generally follow that of other epilepsies  Everolimus and a specific cannabidiol formulation are approved by regulatory authorities for treatment of seizures associated with TSC; no comparative effectiveness data exist to recommend antiseizure medications, everolimus, cannabidiol, or dietary therapies over one another in specific subsets of patients  Epilepsy surgery should be considered for patients with refractory seizures, seizures, particularly after failing three medications  Special consideration should be given to children at younger ages experiencing neurological regression, and evaluation for surgery should be performed at centers with experience and expertise in TSC    Brain MRI  Obtain brain MRI every 1-3 years in asymptomatic patients younger than age 25 years to monitor for new occurrence of SEGA  Patients with large or growing SEGA, or with SEGA causing ventricular enlargement who are asymptomatic,  should undergo MRI scans more frequently, and the patient and their family should be educated regarding the potential of new symptoms  Patients with asymptomatic SEGA in childhood should continue to be imaged periodically as adults to ensure there is no growth    Medical/Surgical Management of Brain Tumor  Surgical resection should be performed for acutely symptomatic SEGA; cerebrospinal fluid diversion (shunt) may also be necessary  Either surgical resection or medical treatment with mammalian target of rapamycin inhibitors (mTORi) may be used for growing but otherwise asymptomatic SEGA (treatment with everolimus, a mTORi, has been found to reduce features of autism spectrum disorder)  For large tumors, if clinical condition enables, neoadjuvant treatment with mTORi may facilitate surgery  Minimally invasive surgical techniques may increase surgical safety in selected patients  In determining the best treatment option, discussion of the complication risks, adverse effects, cost, length of treatment, and potential impact on TSC-associated comorbidities should be included in the decision-making process Referring Malorie to [facility] for management (Spoke with Dr. Stein, who advised me that separate referral to Neurology or to Neurosurgery is not necessary; of note, Malorie is established with [facility])          TSC-Associated Neuropsychiatric Disorder (TAND)     Managing Provider: Sly Stein II, Ph.D., [facility]     TAND refers to the functional and clinical manifestations of brain dysfunction common with TSC, including behavioral, psychiatric, intellectual, academic, neuropsychological, and psychosocial difficulties    TAND features (observed in this approximate percentage of individuals with TSC):  >90% of individuals with TSC with experience one or more TAND concern in their lifetime  Autism spectrum disorder (ASD) (16%-61%)  Having SEGA increases the chance of having  ASD  Attention deficit-hyperactivity disorder (ADHD) (21%-50%)  Learning and cognitive impairment (44%-64%)  Serious academic difficulties (36%-58%)  Uncontrolled seizure activity significantly increases risk of developing learning and cognitive impairment  Disruptive behaviors and emotional problems (13%-58%)  Self-injurious behavior (27%-41%)  Anxiety (9%-48%)  Depression (6%-43%)   Upon Initial Diagnosis/Suspicion of TSC Ongoing Management Patient- Specific Plan   Perform comprehensive assessment for all levels of potential TAND manifestations; refer as appropriate to suitable professionals to initiate evidence-based interventions based on the TAND profile of above-identified needs    Provide caregiver education and training about TAND to ensure families know what to look out for in emerging TAND manifestations (e.g. autism spectrum disorder, language disorders, attention-deficit/hyperactivity disorder, anxiety disorders)    Provide psychological and social support to families around diagnosis and coming to terms with the diagnosis of TSC and TAND, and ensure strategies are in place to support caregiver well-being Aim for early identification of TAND manifestations and early intervention  Perform annual screening for TAND, using validated screening tools such as the TAND Checklist (https://tandconsortium.org/checklists/); screening may be done more frequently depending on clinical needs; perform comprehensive formal evaluation for TAND across all levels of TAND at key developmental time points:  Infancy (0-3 years),  (3-6 years), pre-middle school (6-9 years), adolescence (12-16 years), early adulthood (18-25 years), and as needed thereafter  When any concerns are identified on screening, proceed to further evaluations by appropriate professionals to diagnose and treat the relevant TAND manifestations; interventions should be personalized to the TAND profile of each individual and be based on  evidence-based practice guidelines/practice parameters for individual manifestations (e.g., autism spectrum disorder, attention-deficit/hyperactivity disorder, anxiety disorder)  Many people with TSC have academic/scholastic difficulties; therefore, always consider the need for an individual educational program  Sudden and unexpected change in behavior should prompt physical evaluation to look at potential medical causes (e.g., SEGA, seizures, renal disease, medications)  Provide psychological and social support to families and caregivers, and ensure strategies are in place to support caregiver well-being  Continue to provide parent/caregiver education and training about TAND to ensure families know what to look out for in emerging TAND manifestations across the lifespan Referring Malorie to [facility] for management          Kidney     Managing Provider: Maxx Thomas MD, [facility]     An estimated 80% of children with TSC have an identifiable renal lesion by a mean age of 10.5 years    Common histologies of renal lesions in TSC (observed in this approximate percentage of individuals with TSC):  Angiomyolipoma and other perivascular epithelioid cell neoplasms (PEComas)  Benign angiomyolipoma (70%)  Malignant angiomyolipoma (<1%)  Renal cysts (20%-30%)  Renal cell carcinoma (RCC) (2%-5%) - Age at diagnosis is often 28-30 years  Eosinophilic solid and cystic renal cell carcinoma (RCC)  RCC with fibromyomatous stroma  Clear cell RCC  Eosinophilic vacuolated tumor  Low-grade oncocytic tumor (<1%)   Upon Initial Diagnosis/Suspicion of TSC Ongoing Management Patient- Specific Plan   Abdomen MRI  Obtain abdomen MRI to assess for the presence of angiomyolipoma and renal cysts - Note:  Common imaging techniques may not distinguish fat-poor angiomyolipoma from RCC, so immunologic staining for HMB-45 for angiomyolipomas and cytokeratin for RCC is recommended  NCCN (2023a) advises to begin at age 12 years  or at age 10 years before the earliest diagnosis in a relative - whichever is earlier; Chi et al. (2021) do not specify age at which to begin  NCCN (2023a) indicates that, while MRI is preferred, CT can be done in place of MRI but should be limited if possible for surveillance due to lifetime radiation exposure; CT can be used for surgical planning    Renal Function  Screen for hypertension by obtaining an accurate blood pressure  Evaluate renal function by determination of glomerular filtration rate (GFR) Abdomen MRI  Obtain MRI of the abdomen to assess for the progression of angiomyolipoma and renal cystic disease every 1-3 years (Chi et al., 2021; NCCN [2023a] advises every 3-5 years) throughout lifetime; imaging frequency would increase as indicated once lesions are detected - Note:  Common imaging techniques may not distinguish fat-poor angiomyolipoma from RCC, so immunologic staining for HMB-45 for angiomyolipomas and cytokeratin for RCC is recommended  NCCN (2023a) advises to begin at age 12 years or at age 10 years before the earliest diagnosis in a relative - whichever is earlier; Chi et al. (2021) do not specify age at which to begin  NCCN (2023a) indicates that, while MRI is preferred, CT can be done in place of MRI but should be limited if possible for surveillance due to lifetime radiation exposure; CT can be used for surgical planning    Renal Function  Assess renal function including determination of GFR and assessment for proteinuria and of blood pressure at least annually    Management of Angiomyolipoma  Embolization followed by corticosteroids is the first-line therapy for angiomyolipoma presenting with acute hemorrhage  Nephrectomy is to be avoided; for asymptomatic, growing angiomyolipoma measuring larger than 3 cm in diameter, treatment with a mammalian target of rapamycin inhibitor (mTORi) is the recommended first-line therapy; selective embolization or kidney-sparing resection  are acceptable second-line treatments for asymptomatic angiomyolipoma    Management of Malignant Renal Tumors  Nephron-sparing surgery is treatment of choice whenever possible, with consideration that the patient may have multiple tumors during their lifetime  Ablative treatment options may be considered for those with significant medical or surgical risks associated with undergoing an operation    Prior to Pregnancy (Females)  Patients of childbearing age who are planning conception should consider undergoing renal imaging prior to pregnancy Malorie is to continue care with Dr. 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%)  Mean age at diagnosis is 28 years  Primarily affects women - Risk possibly as high as 80% by age 40 years; symptomatic PARTIDA in approximately 5%-10% of women  Findings consistent with PARTIDA are observed in 10%-12% of males  May present with shortness of breath or hemoptysis  Can progress to respiratory failure and death    Multifocal micronodular pneumonocyte hyperplasia (MMPH) (prevalence not known but may be as high as 40%-58%)     Upon Initial Diagnosis/Suspicion of TSC Ongoing Management Patient- Specific Plan   Risk Factors and Symptoms  Inquire about tobacco exposure, connective tissue disease manifestations, signs of chyle leak, and pulmonary manifestations of dyspnea, cough, and spontaneous pneumothorax in all adult patients     Chest CT  Perform baseline chest CT in all females and symptomatic males, starting at age 18 years or older    PFTs  Perform baseline pulmonary function tests (PFTs) and 6-minute walk test (6MWT) in patients with evidence of cystic lung disease consistent with PARTIDA on the screening chest CT Risk Factors and Symptoms  Inquire about smoking, occupational exposures, connective tissue disease symptoms,  chyle leak, and pulmonary manifestations, such as dyspnea, cough, and spontaneous pneumothorax in all adult patients at each clinic visit   patients regarding the risk of pregnancy and exogenous estrogen use  Avoid routine use of hormonal therapy or doxycycline for the treatment of PARTIDA  Advise patients against tobacco smoke exposure including the use of electronic cigarettes and vaping  Educate patients and caregivers about the signs and symptoms of a pneumothorax, and advise them to seek medical attention if the patient experiences any of these symptoms    Chest CT  For adult females with a negative screening chest CT who remain asymptomatic, repeat to screen for the presence of PARTIDA every 5-7 years until end of menopause  For patients with evidence of cystic lung disease consistent with PARTIDA on screening chest CT, follow-up scan intervals should be determined on a case-by-case basis depending upon the individual circumstances, such as the presence or absence of symptoms, the ability to perform reliable PFTs, pre-existing use of mammalian target of rapamycin (mTORi) for other TSC indications, treatment response (or the lack thereof), or development of other pulmonary complications    PFTs  Perform routine serial pulmonary function test monitoring at least annually in patients with evidence of PARTIDA on chest CT, and more frequently in patients who are progressing rapidly or who are being monitored for response to therapy    Treatment of PARTIDA  Use mTORi for treatment of PARTIDA in patients with abnormal lung function (FEV1 < 70% predicted), physiological evidence of substantial disease burden (abnormal DLCO [<80% or less than lower limit of normal (when available)]), air trapping (RV > 120%), resting or exercise-induced oxygen desaturation, rapid decline (rate of decline in FEV1 > 90 mL/year), and problematic chylous effusions  Consider measurement of annual vascular endothelial growth factor D (VEGF-D) levels in  patients who are unable to perform reliable PFTs tests to monitor adequacy of pharmacodynamic suppression of the mTOR pathway    Treatment of Pulmonary Symptoms  Trial inhaled bronchodilators in patients with symptoms of wheezing, dyspnea, chest tightness, or obstructive defect on spirometry, with continued use in patients who derive symptomatic benefit    Management of Pneumothorax  Offer pleurodesis following the first episode of pneumothorax rather than waiting for a recurrent event;  patients that pleurodesis does not preclude future lung transplantation    Vaccinations  Encourage age-appropriate vaccinations, such as annual influenza vaccination, inactivated recombinant shingles vaccination, and both 13-valent and 23-valent pneumococcal vaccinations  Patients on mTORi should receive the recombinant varicella vaccine regardless of age and avoid all live vaccines Referring Malorie to [facility] for management         Skin     Managing Provider:   [facility]   Skin features of TSC include:  Angiofibroma  Fibrous cephalic plaque  Hypomelanotic macules  Shagreen patch  Ungual fibroma  Confetti skin lesions    Fortunately, the skin lesions do not tend to result in serious medical problems     Upon Initial Diagnosis/Suspicion of TSC Ongoing Management Patient- Specific Plan   Skin Exam  Perform a detailed clinical dermatologic inspection/ examination Sun Protection  Provide ongoing education on sun protection    Skin Exam  Perform annual skin examinations for children   Adult dermatologic evaluation frequency depends on the cutaneous manifestation; close surveillance and intervention are generally recommended for TSC-related skin lesions that rapidly change in size and/or number; cause functional interference, pain, or bleeding; or inhibit social interactions    Treatment of Skin Lesions  For flat or minimally elevated lesions, topical mammalian target of rapamycin (mTORi) treatment is  recommended; watch for improvement in skin lesions over several months; if lesions do not improve, or if earlier intervention is indicated, consider use of surgical approaches.   For protuberant lesions, consider surgical approaches (e.g. excision, lasers) Referring Malorie to [facility] for management         Teeth/Oral     Managing Provider:   Dentist   Comments: Teeth/oral features of TSC include:  Dental enamel pits  Intraoral fibroma   Upon Initial Diagnosis/Suspicion of TSC Ongoing Management Patient- Specific Plan   Dental Exam  Perform a detailed dental dermatologic inspection/ examination Dental Exam  Perform a detailed clinical dental inspection/examination at minimum every 6 months  Take a panoramic radiograph to evaluate dental development or if asymmetry, asymptomatic swelling, or delayed/abnormal tooth eruption occurs    Management of Enamel Pits  Enamel pits may be managed by preventive measures as first-line treatment (sealants, fluoride)  These pits may be managed by restorations if preventive measures fail, or if symptomatic, carious, or there is an aesthetic concern    Treatment of Oral Fibromas/Bony Jaw Lesions  Symptomatic or deforming oral fibromas and bony jaw lesions should be treated with surgical excision or curettage when present Malorie is to establish care with Dentistry independently but can notify me should any assistance be needed         Heart     Managing Provider:   [facility]   TSC heart features (observed in this approximate percentage of individuals with TSC):  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%)  Have been documented to regress with time and eventually disappear  Often largest during the  period  If cardiac outflow obstruction does not occur at birth, the individual is unlikely to have health problems from these tumors later; however, a small number of individuals have arrhythmias postulated to results from cells left after rhabdomyoma regression   Upon Initial  Diagnosis/Suspicion of TSC   Ongoing Management Patient- Specific Plan   Fetal Patients  Consider fetal echocardiography to detect fetuses with high risk of heart failure after delivery when rhabdomyoma is identified via prenatal ultrasound    Pediatric Patients  Obtain an echocardiography in pediatric patients, especially if younger than age 3 years    All Patients  Obtain an electrocardiography at all ages to assess for underlying conduction defects Pediatric Patients  Obtain an echocardiography every 1-3 years in asymptomatic pediatric patients until regression of cardiac rhabdomyomas is documented; more frequent or advanced diagnostic assessment may be required for symptomatic patients    All Patients  Obtain electrocardiography every 3 to 5 years in asymptomatic patients of all ages to monitor for conduction defects; more frequent or advanced diagnostic assessment such as ambulatory and event monitoring may be required for symptomatic patients Referring Malorie to [facility] for management         Eye     Managing Provider:   [facility]   Eye features of TSC include (observed in this approximate percentage of individuals with TSC):  Retinal nodular hamartoma (30%-50%) - Usually asymptomatic, but a few individuals have had progressive enlargement with total exudative retinal detachment and neovascular glaucoma  Retinal achromic patch (39%) - Usually asymptomatic   Upon Initial Diagnosis/Suspicion of TSC Ongoing Management Patient- Specific Plan   Eye Exam  Perform a complete ophthalmo- logic evaluation, including dilated fundoscopy, to assess for retinal findings (astrocytic hamartoma and achromic patch) and visual field deficits Eye Exam  Perform annual ophthalmic evaluation     Management of Lesions  Rare cases of aggressive lesions or those causing vision loss due to their location affecting the fovea or optic nerve may require intervention; mammalian target of rapamycin complex inhibitors  (mTORi) have been used with some success to treat problematic retinal astrocytic hamartomas    For Patients on Vigabatrin  For patients receiving vigabatrin, there are specific concerns related to visual field loss, which appears to correlate with total  cumulative dose; perform vision testing within 4 weeks of therapy initiation, at 3-month intervals while on treatment, and at 3-6 months after treatment discontinuation  Providers responsible for monitoring children on vigabatrin can offer serial fundoscopic examinations to detect retinal changes Referring Malorie to [facility] for management         Other Lesions     Ongoing Management Patient- Specific Plan   Neuroendocrine tumors (NET)  Identification of unexpected functional and nonfunctional pancreatic neuroendocrine tumors (NETp) have been found during abdominal MRI surveillance in individuals with TSC - If such is identified, refer to Endocrinology for further monitoring and evaluation   Although rarely, patients with TSC may develop pituitary adenoma and/or parathyroid adenoma/hyperplasia - If such is identified, refer to Endocrinology/Endocrine Surgery   Single-case reports have included gastrinoma, pheochromocytoma, and carcinoids - Preoperative alert:  If at increased risk of developing pheochromocytoma, screen blood/urine for pheochromocytoma in the preoperative period    Hepatic angiomyolipoma  Observed in approximately 13% of patients with TSC -- If such is identified, refer to Oncology Malorie is to report promptly to a healthcare provider any symptoms that deviate from her baseline    Malorie is to notify her surgeon of her TSC prior to any surgical procedure       References for the above:  National Comprehensive Cancer Network (NCCN). (2023a). Kidney cancer. NCCN Clinical Practice Guidelines in Oncology (NCCN Guidelines), Version 1.2024.   National Comprehensive Cancer Network (NCCN). (2023b). Neuroendocrine and adrenal tumors. NCCN  Clinical Practice Guidelines in Oncology (NCCN Guidelines), Version 1.2023.   PATTI Mireles, RAJEEV Landeros, TOSHIA Noriega, FERNANDO Harry, PABLO Becerril, VIPIN Bravo, RAJEEV Hermosillo, TIGIST Suarez, TOSHIA Amor S., TYLER Fournier, GINA Márquez., CHRISTIANA Coffey, FERNANDO Adams., PABLO Almanzar., MERARY Doherty., GINA Frazier, CHRISTIANA Flores., SAPNA Santos., TORY Malin., FERNANDO Foster., SHARON Childers., Akin, E. NIKKI., Ashlie, H. TOMMY., FERNANDO Maher., GINA Ayers., & Demetris, D. A. (2021). Updated international tuberous sclerosis complex diagnostic criteria and surveillance and management recommendations. Pediatric Neurology, 123. https://doi.org/10.1016/j.pediatrneurol.2021.07.011  CRUZ Mireles., RAJEEV Chiang., Jackson, D. A., & Geetha, K. S. (2021). Tuberous sclerosis complex. GeneRevCirqle.nl [Internet]. Available from https://www.ncbi.nlm.nih.gov/books/FZN2058/        Cancer-Related Information Beyond TSC     No other clinically significant mutations or variants of unknown significance were identified besides the abovementioned TSC1 mutation.    Your negative results for genes associated with cancers/other conditions in your relatives, with which you have not personally been affected, are uninformative.  In other words, your affected relatives' cancers/other conditions may have been hereditary or may have been sporadic, and without knowing that information, your negative results in the associated genes only tell us that you likely don't have a hereditary predisposition to those cancers/other conditions; however, you can still develop those cancers/other conditions and in fact may even have an increased risk for them based in part on your family history.  If the appropriate, affected relatives are found to carry a pathogenic (harmful) variant that explains their cancers/other conditions, and you do not carry that variant, your negative results would then be considered a true-negative.    Re-requisition    Of note, [facility] offers  re-requisition--the testing of additional genes within the same clinical area (i.e., oncology, in this case)--at no additional charge and with no need for a new specimen.  If you are interested in pursuing this or have questions regarding this, please message me through your [facility] patient portal or call me at [phone].  If you are pursuing re-requisition, [facility] requires that they receive the request no later than December 28, 2023, so please reach out to me before then.    Health Maintenance / Cancer Risks and Risk Management    Only a small percentage (approximately 5%-10%) of cancers are hereditary, meaning caused by an inherited gene mutation; rather, most cancers are sporadic.  Environmental factors, lifestyle factors, and even factors beyond our control play a significant role in the development of many cancers.  As such, an individual can develop cancer even with negative genetic testing.  Furthermore, even with negative genetic testing, you can still be at increased risk for certain cancers, as you may independently have risk factors for those cancers and/or you may share risk factors with your family members affected by cancer.  For these reasons, it is strongly recommended that you ensure that your healthcare providers are aware of and up-to-date on your personal and family history so that your medical management including cancer screenings can be based in part off of this information.      The following cancer screenings are currently recommended for you (please note that these recommendations can change based on updates to clinical guidelines and/or with changes to your medical or family history and are therefore only considered accurate as of the date on which this letter was composed; additional and/or alternative screenings may be recommended by your healthcare providers, and recommendations from your healthcare providers directly managing these risks supersede the below  "recommendations):    Cancer in general:  Annual visit with a primary care provider (PCP) for history review and physical exam and age-appropriate testing.    Gynecologic cancers:  Annual visit with your gynecology provider, which may include pelvic exam and/or Pap smear/HPV testing.    Breast cancer:  There are models that help us to "calculate" your breast cancer risk.  Your current, estimated risks for developing breast cancer are as follows:    5-year:  0.7%-0.8% (average risk)  10-year:  ~1.2% (does not reach the threshold at which risk-reducing medication is recommended unless otherwise contraindicated)  Lifetime:  ~6.2% (average risk)    The below recommendations are made based in part on the above risk scores.    Ongoing breast cancer risk assessment and breast cancer risk-reduction counseling.  (I would be happy to perform this any time you reach out to me and request such.)  Ongoing breast awareness, meaning you are familiar with your breasts, perform breast self-exams at least monthly, and promptly report changes/concerns to your healthcare provider.  Clinical breast exam by a healthcare provider every 12 months.    Annual mammogram.    Colorectal cancer:  Colorectal cancer screenings, as recommended by your PCP or, if you have one, your gastroenterology (GI) provider, starting at age 45.  With regard to family history, please let me know if any of the following applies, as such could change colorectal cancer screening recommendations for you:  If you learn moving forward that any blood relative of yours has been diagnosed with colorectal cancer.  If a first-degree relative (i.e., your parent, sibling, or child) has a colorectal polyp history including any of the following characteristics:  Adenoma with high-grade dysplasia  Adenoma or sessile serrated polyp/adenoma 1 cm or larger in size  Villous or tubulovillous adenoma  Traditional serrated adenoma (TSA)  Sessile serrated lesion/polyp/adenoma with any " dysplasia  Cumulatively 10 or more polyps    Skin cancer:  Annual total-body skin examination (TBSE) with a dermatology provider.    If you are not established with a healthcare specialist listed above, please let me know so I can refer you.    Some information regarding general cancer risk reduction:  It is recommended to avoid tobacco use; be physically active; maintain a healthy weight; eat a diet rich in fruits, vegetables, and whole grains and low in saturated/trans fat, red meat, and processed meat; limit alcohol consumption (zero is best); protect against sexually transmitted infections; protect against the sun (by minimizing ultraviolet (UV) exposure, wearing UV-protective clothing, and using high-SPF sunblock), and avoid tanning beds; and get regular screenings for cancers as recommended by your healthcare team.    Regarding Your Relatives    Your relatives also may be at increased risk for certain cancers, depending upon their own personal and family history; therefore, it is important that they, too, ensure that their own healthcare providers are aware of and up-to-date on their personal and family history so that their medical management including cancer screenings can be based in part off of this information.      Additionally, it is possible for your relatives to have hereditary cancer susceptibility gene mutations even when you have negative genetic testing.      Your relatives should speak with a healthcare provider about their cancer risks and whether genetic counseling and potentially testing may be indicated for them.  Anyone interested in pursuing cancer genetic counseling/testing may contact the [facility] at [phone] to schedule an appointment or may visit [email or web address] to locate a genetic specialist near them.    Cancer Genetics Follow-up    Moving forward, please update me with any changes to--or new information learned about--your personal or family history and with any  relative's genetic testing results.  Barring any such changes, please follow up with me in 1 year and as you need/desire sooner.    In the meantime, please don't hesitate to reach out with any questions or concerns.        Diagnoses and Orders for This Encounter:    ICD-10-CM ICD-9-CM   1. 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 History of learning disability  Z87.898 V40.0   5. Abnormal finding on lung imaging  R91.8 793.19   6. History of seizures  Z87.898 V13.89     1. Encounter for nonprocreative genetic counseling    2. Tuberous sclerosis  - Ambulatory referral/consult to Adult Neuropsychology; Future  - Ambulatory referral/consult to Pulmonology; Future  - Ambulatory referral/consult to Dermatology; Future  - Ambulatory referral/consult to Ophthalmology; Future  - Ambulatory referral/consult to Cardiology; Future    3. Monoallelic mutation of TSC1 gene  - Ambulatory referral/consult to Pulmonology; Future  - Ambulatory referral/consult to Dermatology; Future  - Ambulatory referral/consult to Ophthalmology; Future  - Ambulatory referral/consult to Cardiology; Future    4. History of learning disability  - Ambulatory referral/consult to Adult Neuropsychology; Future    5. Abnormal finding on lung imaging  - Ambulatory referral/consult to Pulmonology; Future    6. History of seizures  - Ambulatory referral/consult to Adult Neuropsychology; Future         Follow-up:  Follow up in about 1 year (around 10/4/2024) and as needed/desired sooner.    Questions were encouraged and answered to the patient's satisfaction, and she verbalized understanding of the information and agreement with the plan.           Approximately 68 minutes were spent face-to-face with the patient.  Approximately 73 minutes in total were spent on this encounter, which includes face-to-face time and non-face-to-face time  preparing to see the patient (e.g., review of tests), obtaining and/or reviewing separately obtained history, documenting clinical information in the electronic or other health record, independently interpreting results (not separately reported) and communicating results to the patient/family/caregiver, or care coordination (not separately reported).         Juanpablo Hernandez, DNP, APRN, FNP-BC, AOCNP, CGRA  Nurse Practitioner, Hereditary and High-Risk Clinic  Department of Hematology and Oncology  [facility]        CC:  Martita Rico MD; Maxx Thomas MD; Nancy James MD, PhD; NATAN lA; MISTY Stein, PhD

## 2023-10-04 ENCOUNTER — OFFICE VISIT (OUTPATIENT)
Dept: HEMATOLOGY/ONCOLOGY | Facility: CLINIC | Age: 43
End: 2023-10-04
Payer: MEDICARE

## 2023-10-04 VITALS — OXYGEN SATURATION: 99 % | SYSTOLIC BLOOD PRESSURE: 132 MMHG | DIASTOLIC BLOOD PRESSURE: 65 MMHG | HEART RATE: 73 BPM

## 2023-10-04 DIAGNOSIS — Z15.89: ICD-10-CM

## 2023-10-04 DIAGNOSIS — Z87.898 HISTORY OF LEARNING DISABILITY: ICD-10-CM

## 2023-10-04 DIAGNOSIS — Z71.83 ENCOUNTER FOR NONPROCREATIVE GENETIC COUNSELING: Primary | ICD-10-CM

## 2023-10-04 DIAGNOSIS — R91.8 ABNORMAL FINDING ON LUNG IMAGING: ICD-10-CM

## 2023-10-04 DIAGNOSIS — Z87.898 HISTORY OF SEIZURES: ICD-10-CM

## 2023-10-04 DIAGNOSIS — Q85.1 TUBEROUS SCLEROSIS: ICD-10-CM

## 2023-10-04 PROCEDURE — 99999 PR PBB SHADOW E&M-EST. PATIENT-LVL V: ICD-10-PCS | Mod: PBBFAC,,, | Performed by: NURSE PRACTITIONER

## 2023-10-04 PROCEDURE — 99215 OFFICE O/P EST HI 40 MIN: CPT | Mod: S$PBB,,, | Performed by: NURSE PRACTITIONER

## 2023-10-04 PROCEDURE — 99417 PROLNG OP E/M EACH 15 MIN: CPT | Mod: S$PBB,,, | Performed by: NURSE PRACTITIONER

## 2023-10-04 PROCEDURE — 99417 PR PROLONGED SVC, OUTPT, W/WO DIRECT PT CONTACT,  EA ADDTL 15 MIN: ICD-10-PCS | Mod: S$PBB,,, | Performed by: NURSE PRACTITIONER

## 2023-10-04 PROCEDURE — 99999 PR PBB SHADOW E&M-EST. PATIENT-LVL V: CPT | Mod: PBBFAC,,, | Performed by: NURSE PRACTITIONER

## 2023-10-04 PROCEDURE — 99215 OFFICE O/P EST HI 40 MIN: CPT | Mod: PBBFAC | Performed by: NURSE PRACTITIONER

## 2023-10-04 PROCEDURE — 99215 PR OFFICE/OUTPT VISIT, EST, LEVL V, 40-54 MIN: ICD-10-PCS | Mod: S$PBB,,, | Performed by: NURSE PRACTITIONER

## 2023-10-04 NOTE — Clinical Note
Hi there,  I met with pt yesterday, and mom said pt is running low on 2 of her 3 seizure meds.  Can your office please assist?  Thanks, Juanpablo Hernandez NP Cancer Genetics

## 2023-10-04 NOTE — Clinical Note
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  I'm referring her to Ophthalmology for an eye exam to assess for any eye abnormality but particularly for those associated with TSC, as well as management of any such findings.   Please schedule for the soonest available appointment.   Once my clinic note from today's visit is signed, management recommendations will be visible.  If any further information is needed, please reach out to me.   Thank you,  Juanpablo Hernandez NP  Cancer Genetics

## 2023-10-04 NOTE — LETTER
"  This communication is flagged as high priority.    10/11/2023   Dear relative of Malorie Taylor ("Malorie",  1980),    Your relative, Malorie, has been diagnosed with a pathogenic mutation in the TSC1 gene.  This type of gene mutation runs in families, so this information is important for Malorie's blood relatives.      Individuals who have a TSC1 pathogenic (or likely pathogenic) mutation that they inherited from a parent or that is de drea (meaning it occurred in the individual for the first time) have a diagnosis of tuberous sclerosis complex (TSC).  TSC involves abnormalities of the skin); brain (subependymal nodules, cortical tubers, and subependymal giant cell astrocytomas [SEGAs], seizures, intellectual disability / developmental delay, psychiatric illness); kidney (angiomyolipomas, cysts, renal cell carcinomas); heart (rhabdomyomas, arrhythmias); and lungs (lymphangioleiomyomatosis [PARTIDA], multifocal micronodular pneumonocyte hyperplasia).      Both males and females can inherit, be affected by, and pass down (to their children) mutations in the TSC1 gene.      If someone finds out that they have TSC and/or a pathogenic or likely pathogenic mutation in their TSC1 gene, often times they can take steps to be proactive with their own health and/or with reproduction.  Management of TSC begins for an at-risk individual as early as when they are in utero and begins for those with TSC in infancy (or later in life if they are not diagnosed or suspected to have TSC until later in life).    It is not clear at this time whether Malorie inherited her TSC1 mutation from a parent or if it is a de drea mutation.    It is recommended that relatives -- both adults and minor children -- of Michael meet with a genetics professional for genetic counseling and potentially testing, for their own health purposes and potentially also for reproductive purposes.  The testing is often done with blood work, and the results can " be used to find out if you have the same gene mutation as Malorie, even if you have never had any of the conditions mentioned above.  Directly below are resources (and their phone numbers) for Malorie's relatives to find a genetic specialist.   Cancer Genetics Reproductive Genetics General Adult/Pediatric Genetics   For individuals located in [place] [facility]:  [phone] [facility]:  [phone] [facility]:  [phone]   For individuals located outside of [place] Genetic specialists nearest to your location can be found by visiting [email or web address]     The lab that performed Malorie's genetic test, [email or web address], offers complimentary family variant testing to Malorie's blood relatives, so long as the test is completed by 2023.  You should bring this letter to your pre-test appointment, as it contains information necessary for participation in the family variant testing program.  If your healthcare provider needs assistance in ordering the test, they can call [employer] at [phone].  Please note that only the single-gene test is complimentary, while the associated visits with a specialist are billable.    Of note, it's also possible that you have gene mutations in addition to and/or other than those identified in Malorie, which is one reason why it's important to first meet with a genetics specialist prior to undergoing genetic testing.  Additionally, the genetics specialist can review with you what it would mean to you and your close blood relatives for you to have a mutation in your TSC1 gene and what your options may be to help manage the associated risks.    The box below contains details of [facility] genetic test and the results.  This information is important to share with your genetics specialist.  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    Sincerely,    Juanpablo Hernandez, DNP, APRN, FNP-BC, AOCNP, CGRA  Nurse Practitioner, Hereditary and High-Risk Clinic  Department of Hematology and Oncology  [facility]    Phone:  [phone]

## 2023-10-04 NOTE — Clinical Note
Good evening,  This patient was recently diagnosed with tuberous sclerosis complex (TSC) (she tested positive for a TSC1 gene mutation).  I'm referring her to Dermatology for a total-body skin exam to assess for any skin abnormality but particularly for those associated with TSC, as well as management of any such findings.  Please schedule for the soonest available appointment.  Once my clinic note from today's visit is signed, management recommendations will be visible.  If any further information is needed, please reach out to me.  Thank you, Juanpablo Hernandez NP Cancer Genetics

## 2023-10-04 NOTE — Clinical Note
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  I'm referring her to Cardio-Oncology for evaluation and management of heart-related TSC implications.  Please schedule for the soonest available appointment.   Once my clinic note from today's visit is signed, management recommendations will be visible.  If any further information is needed, please reach out to me.   Thank you,  Juanpablo Hernandez NP  Cancer Genetics

## 2023-10-04 NOTE — Clinical Note
Good evening,  This patient was recently diagnosed with tuberous sclerosis complex (TSC) (she tested positive for a TSC1 gene mutation).  I'm referring her to Pulmonology for evaluation and management related to the lung implications of TSC and for the pulmonary micronodules reported on recent imaging (report in Epic).  Please schedule for the soonest available appointment.  Once my clinic note from today's visit is signed, management recommendations will be visible.  If any further information is needed, please reach out to me.  Thank you, Juanpablo Hernandez NP Cancer Genetics

## 2023-10-04 NOTE — Clinical Note
Good evening,  This patient was recently diagnosed with tuberous sclerosis complex (TSC) (she tested positive for a TSC1 gene mutation).  I'm referring her to Neuropsychiatry for evaluation and management related to the neurologic implications of TSC, including but not limited to evaluation for TSC-associated neuropsychiatric disorder (TAND) and further testing (brain MRI, EEG) as appropriate.  She also has a history of learning disability and seizures.  I secure-chatted briefly with Dr. Stein about this case.  Please schedule for the soonest available appointment.  Once my clinic note from today's visit is signed, management recommendations will be visible.  If any further information is needed, please reach out to me.  Thank you, Juanpablo Hernandez NP Cancer Genetics

## 2023-10-05 PROBLEM — Z15.89: Status: ACTIVE | Noted: 2023-10-05

## 2023-10-05 PROBLEM — Q85.1 TUBEROUS SCLEROSIS: Status: ACTIVE | Noted: 2023-10-05

## 2023-10-06 ENCOUNTER — TELEPHONE (OUTPATIENT)
Dept: NEUROLOGY | Facility: CLINIC | Age: 43
End: 2023-10-06
Payer: MEDICARE

## 2023-10-06 ENCOUNTER — TELEPHONE (OUTPATIENT)
Dept: PULMONOLOGY | Facility: CLINIC | Age: 43
End: 2023-10-06
Payer: MEDICARE

## 2023-10-06 NOTE — TELEPHONE ENCOUNTER
----- Message from Anny Miranda sent at 10/6/2023  9:00 AM CDT -----  Regarding: returning call  Contact: [phone]  Pt Returning Calls:  Name of Caller: Dani pt dad     Who left message for pt: Laney     Do you know what the call was regarding: scheduling a sooner appt       Call back #: [phone]  Additional Info: (optional): dad would like to receive the call for pt. Please give rizwan Louise a call back.

## 2023-10-06 NOTE — TELEPHONE ENCOUNTER
Left message on pt voicemail, informing her that I'm contacting her in regards to offering her a sooner appointment with one of our providers. I also advised pt that if she wants to reschedule appointment or if she has any questions or concerns, she may contact the office. Office number has been provided.

## 2023-10-06 NOTE — TELEPHONE ENCOUNTER
----- Message from Juanpablo Hernandez DNP sent at 10/4/2023  5:15 PM CDT -----  Good evening,    This patient was recently diagnosed with tuberous sclerosis complex (TSC) (she tested positive for a TSC1 gene mutation).  I'm referring her to Pulmonology for evaluation and management related to the lung implications of TSC and for the pulmonary micronodules reported on recent imaging (report in Epic).    Please schedule for the soonest available appointment.    Once my clinic note from today's visit is signed, management recommendations will be visible.  If any further information is needed, please reach out to me.    Thank you,  Juanpablo Hernandez NP  Cancer Genetics

## 2023-10-06 NOTE — TELEPHONE ENCOUNTER
----- Message from Nancy James MD PhD sent at 10/5/2023  5:48 PM CDT -----  Thank you for letting us know.     Magda, could you please call and set up follow-up with Dali?    Thank you!  Nancy    ----- Message -----  From: Juanpablo Hernandez, ERIC  Sent: 10/5/2023   4:59 PM CDT  To: Nancy James MD PhD; JOSI Al there,    I met with pt yesterday, and mom said pt is running low on 2 of her 3 seizure meds.  Can your office please assist?    Thanks,  Juanpablo Hernandez, SILVIO  Cancer Genetics

## 2023-10-06 NOTE — TELEPHONE ENCOUNTER
I called and spoke to Mr Taylor about a visit with Dr Reid for his daughter Malorie. She will come 11-8-23 at 3pm. Appointment accepted and mailed to the [facility]. Nadia Arboleda LPN

## 2023-10-11 ENCOUNTER — OFFICE VISIT (OUTPATIENT)
Dept: NEUROLOGY | Facility: CLINIC | Age: 43
End: 2023-10-11
Payer: MEDICARE

## 2023-10-11 ENCOUNTER — LAB VISIT (OUTPATIENT)
Dept: LAB | Facility: HOSPITAL | Age: 43
End: 2023-10-11
Payer: MEDICARE

## 2023-10-11 VITALS
HEIGHT: 67 IN | HEART RATE: 67 BPM | BODY MASS INDEX: 37.35 KG/M2 | SYSTOLIC BLOOD PRESSURE: 144 MMHG | DIASTOLIC BLOOD PRESSURE: 88 MMHG | WEIGHT: 238 LBS

## 2023-10-11 DIAGNOSIS — G40.909 SEIZURE DISORDER: ICD-10-CM

## 2023-10-11 DIAGNOSIS — G40.909 SEIZURE DISORDER: Primary | ICD-10-CM

## 2023-10-11 DIAGNOSIS — Q85.1 TUBEROUS SCLEROSIS: ICD-10-CM

## 2023-10-11 DIAGNOSIS — N28.89 RENAL MASS: ICD-10-CM

## 2023-10-11 DIAGNOSIS — Z15.89: ICD-10-CM

## 2023-10-11 DIAGNOSIS — R91.8 MULTIPLE LUNG NODULES: ICD-10-CM

## 2023-10-11 DIAGNOSIS — F79 INTELLECTUAL DISABILITY: ICD-10-CM

## 2023-10-11 LAB — CARBAMAZEPINE SERPL-MCNC: 8 UG/ML (ref 4–12)

## 2023-10-11 PROCEDURE — 99215 OFFICE O/P EST HI 40 MIN: CPT | Mod: S$PBB,,,

## 2023-10-11 PROCEDURE — 99213 OFFICE O/P EST LOW 20 MIN: CPT | Mod: PBBFAC

## 2023-10-11 PROCEDURE — 36415 COLL VENOUS BLD VENIPUNCTURE: CPT

## 2023-10-11 PROCEDURE — 99999 PR PBB SHADOW E&M-EST. PATIENT-LVL III: ICD-10-PCS | Mod: PBBFAC,,,

## 2023-10-11 PROCEDURE — 99215 PR OFFICE/OUTPT VISIT, EST, LEVL V, 40-54 MIN: ICD-10-PCS | Mod: S$PBB,,,

## 2023-10-11 PROCEDURE — 80156 ASSAY CARBAMAZEPINE TOTAL: CPT

## 2023-10-11 PROCEDURE — 99999 PR PBB SHADOW E&M-EST. PATIENT-LVL III: CPT | Mod: PBBFAC,,,

## 2023-10-11 NOTE — PATIENT INSTRUCTIONS
You came to [facility]psy [facility] because of your seizure disorder.  Your seizures are well controlled on topiramate 200 mg twice daily, carbamazepine 400 mg twice daily, and gabapentin 1200 mg 3 times daily. Please continue the same medications at the same dose.     Do not miss any doses of medication. If a dose of medication is missed, take it as soon as it is remembered even if that means doubling up on the dose. Please get a lab test to check out the blood level of medication. Take folic acid daily. Get regular sleep. Go to sleep at the same time and wake up at the same time every day. People with epilepsy require more sleep than people without epilepsy.  Sleeping 10-12 hours a day can be normal for a person with epilepsy.  Every seizure makes it harder to prevent the next seizure. Epilepsy is associated with SUDEP, or sudden unexpected death in epilepsy.  The risk is significantly higher if convulsive seizures are not well controlled. For more information, check out these websites: https://www.epilepsy.com/, https://www.epilepsyalliance[place].org/, www.ildef[place]-epilepsy.org, www.womenandepilepsy.org.  If you are interested in meeting other individuals in our epilepsy community, please reach out to the [facility] [place] ([phone], [phone], [email or web address]).  They organize many informative and fun activities in the region.  They can provide you invaluable information on how to get access to resources available for patients living with epilepsy as well as a rich community of like-minded individuals who are all learning to cope with the same issues.  It is very important to remember, you are not alone.     Per [place] law, no episodes of loss of consciousness for 6 months before driving.  Avoid dangerous situations.  For example, no baths/pools alone, no heights, no power tools.  Wear a bike helmet.  If breakthrough seizures occur that are different in character, frequency, or duration from  normal episodes, please patient portal me or call the office and we will decide the next steps. If multiple seizures occur in a row without return back to baseline, 911 needs to be called.     Return to clinic in 1 year or sooner with issues.  Please patient portal with any questions or concerns.    Kimberlee Fierro PA-C   Neurology-Epilepsy  [facility]    Forms/Letters/Disability/DMV Paperwork: We understand the importance of filling out forms and providing letters in a timely manner.  However, many of these forms have very tricky language and once an official form is submitted as part of the medical record, it can not be modified or erased.  Please work with us in order to get these forms filled out in the most complete, accurate, and efficient way. 1.  Once you are aware that a form will need to be completed, please make an appointment.  A virtual appointment with Dr. James or Dali is perfectly fine. 2.  Please fill out the form as much as you can.  There are many questions that we do not have an answer for.  Please bring a blank copy of the form and your partially filled out form to the clinic visit or send them to us over the portal.  We will complete the form together with you during the clinic visit, sign it, and either return it to you or send it to the correct destination.  Every form will require an appointment however we can fill out multiple forms at once if needed.  Please do not hesitate to reach out with any questions or concerns about this policy.  We are trying to make sure that we have a system in place to meet this need which works for everyone involved.  Thank you for your understanding.

## 2023-10-11 NOTE — PROGRESS NOTES
Name: Malorie Taylor  MRN:[id number]   CSN: [id number]  Date of service: 11/17/2021  Age:43 y.o.   Gender:female   Referring Physician/Service: No referring provider defined for this encounter.   The patient is here today with: Mom and dad    Neurology Clinic: Follow-up Visit    CHIEF COMPLAINT:  Epilepsy    Interval Events/ROS 10/11/2023:    Current ASM/SEs: topiramate 200 mg twice daily, carbamazepine 400 mg twice daily, and gabapentin 1200 mg 3 times daily; no SE  Breakthrough seizures/events: none  Driving: no  Folic acid: yes  Sleep: good   Mood: good    Renal mass, lung nodules -> per heme/onc cancer genetics note: cancer-genetic risk assessment (upon referral by Maxx Thomas MD, [facility]) and subsequently underwent hereditary cancer genetic testing.  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 Overall, parents report she has been doing well. Moving to [place] soon. Otherwise, no fever, no cold symptoms, no headache, no changes in vision, no new weakness, no chest pain, no shortness of breath, no nausea, no vomiting, no diarrhea, no constipation, no tingling/numbness, no problems walking. Patient has no complaints today.     Recent Labs   Lab 11/07/22  0955   Topiramate Lvl 8.5   Carbamazepine Lvl 8.3      Interval Events/ROS 11/7/2022:    Accompanied by mom who provides majority of the history.     Current ASM/SEs: topiramate 200 mg twice daily, carbamazepine 400 mg twice daily, and gabapentin 1200 mg 3 times daily; no SE  Breakthrough seizures/events: no seizures in years   Driving: no  Folic acid: yes  Sleep: good  Mood: good    Otherwise, no fever, no cold symptoms, no headache, no changes in vision, no new weakness, no chest pain, no shortness of breath, no nausea, no vomiting, no diarrhea, no constipation, no tingling/numbness, no problems walking.    HPI 11/17/2021:     Age of first seizure: 8yo  Seizure Risk Factors:   Paternal grandmother's twin sister with seizures, Special Education courses, full-term  because of prior  with no prolonged hospitalization, no CNS infections, no head strike with LOC  Time of Last Seizure:   # of lifetime Seizures: maybe 5-10 convulsions, several hundred staring episodes   Frequency of Seizures: has had as many as 100 staring seizures in a night, almost daily at the worse, currently, on these meds, none for years   Seizure Triggers: changes in medication  Injuries/Hospitalization for seizures? No injuries, no hospitalizations just for seizures   Pregnancy? No   Contraception? No   Folic Acid? Yes in a daily vitamin   Bone Health: no Dexa      Auras: no warning     Seizure Events:   1. Staring, eyes open, decreased responsiveness, some confusion, lasts a few seconds, followed by a head nod, will not remember these, usually goes back to what she is doing    2. Generalized convulsion    Current AED/SEs:  1. Topiramate 200 mg twice daily SE none   2. Carbamazepine 400 mg twice daily SE none   3. Gabapentin 1200 mg 3 times daily SE none     Previous AED/SEs or reason for DC.   1. Phenobarbital (allergic reaction) - lethargy   2. Attempted other medications, unclear which ones    EEG: none recently, had these in the past, results unclear     MRI: no head imaging in the system     AED compliance, adherence: no missed doses. pill minder     ROS 2021:PCP [place]. 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 Treadmill with some walking. Otherwise, denies headache, loss of vision, blurred vision, diplopia, dysarthria, dysphagia, lightheadedness, vertigo, tinnitus or hearing difficulty. Denies difficulties producing or comprehending speech.  Denies focal weakness, numbness, paresthesias. Denies difficulty with gait. Denies cough, shortness of breath.  Denies chest pain or tightness, palpitations.  Denies nausea, vomiting, diarrhea, constipation or abdominal pain.  No bowel or  "bladder incontinence or retention.  No falls.       EXAM:   - Vitals: BP (!) 144/88   Pulse 67   Ht 5' 7" (1.702 m)   Wt 108 kg (237 lb 15.8 oz)   BMI 37.27 kg/m²    - General: Awake, cooperative, NAD.  - HEENT: NC/AT  - Neck:  Decreased range of motion  - Pulmonary: no increased WOB  - Cardiac: well perfused   - Abdomen: soft, nontender, BMI  - Extremities: no edema  - Skin: no rashes or lesions noted.     NEURO EXAM:   - Mental Status: Awake, alert, oriented x 3. Mom and dad provide the entire history.  Remains quiet for the majority of history and exam.  But will answer appropriately with single words and simple phrases. There were no paraphasic errors. Able to name both high frequency objects. Speech was not dysarthric. Able to follow simple commands errors on cross-body commands.      - Cranial Nerves:  VFF to confrontation. EOMI. No facial droop. Hearing intact to room voice. 5/5 strength in trapezii and SCM bilaterally. Tongue protrudes in midline and to either side with no evidence of atrophy or weakness.    - Motor: Normal bulk and tone throughout. No pronator drift bilaterally. No adventitious movements such as tremor or asterixis noted.     Delt Bic Tri WrE WrF  FFl FE IO IP Quad Ham TA Gastroc    R   5     5    5    5    5        5   5    5   5    5        5     5      5          L   5      5    5   5    5        5    5   5    5    5       5     5      5            - Sensory: No deficits to light touch. No extinction to DSS.  - Coordination:  Mild tremor on FNF but hits target.    - Gait: Good initiation.  Wide-based. Romberg negative.    PLAN: 43-year-old woman with cryptogenic epilepsy since childhood who recently underwent hereditary cancer genetic testing for renal mass which revealed a pathogenic mutation in her TSC1 gene. Dx w/ tuberous sclerosis which is likely the etiology of her epilepsy as well. Seizures have been well controlled for years on topiramate 200 mg twice daily, carbamazepine 400 " mg twice daily, and gabapentin 1200 mg 3 times daily. Annual levels. Refills provided. Advised to reach out over the portal with any issues. Patient and parents are comfortable with plan. All questions and concerns are addressed at this time. Follow up in about 1 year (around 10/11/2024).     Patient Instructions   You came to Epilepsy Clinic because of your seizure disorder.  Your seizures are well controlled on topiramate 200 mg twice daily, carbamazepine 400 mg twice daily, and gabapentin 1200 mg 3 times daily. Please continue the same medications at the same dose.     Do not miss any doses of medication. If a dose of medication is missed, take it as soon as it is remembered even if that means doubling up on the dose. Please get a lab test to check out the blood level of medication. Take folic acid daily. Get regular sleep. Go to sleep at the same time and wake up at the same time every day. People with epilepsy require more sleep than people without epilepsy.  Sleeping 10-12 hours a day can be normal for a person with epilepsy.  Every seizure makes it harder to prevent the next seizure. Epilepsy is associated with SUDEP, or sudden unexpected death in epilepsy.  The risk is significantly higher if convulsive seizures are not well controlled. For more information, check out these websites: https://www.epilepsy.com/, https://www.epilepsyalliance[place].org/, www.ildefonso-epilepsy.org, www.womenandepilepsy.org.  If you are interested in meeting other individuals in our epilepsy community, please reach out to the [facility] [place] ([phone], [phone], info@[place].org).  They organize many informative and fun activities in the region.  They can provide you invaluable information on how to get access to resources available for patients living with epilepsy as well as a rich community of like-minded individuals who are all learning to cope with the same issues.  It is very important to remember,  you are not alone.     Per [place] law, no episodes of loss of consciousness for 6 months before driving.  Avoid dangerous situations.  For example, no baths/pools alone, no heights, no power tools.  Wear a bike helmet.  If breakthrough seizures occur that are different in character, frequency, or duration from normal episodes, please patient portal me or call the office and we will decide the next steps. If multiple seizures occur in a row without return back to baseline, 911 needs to be called.     Return to clinic in 1 year or sooner with issues.  Please patient portal with any questions or concerns.    Kimberlee Fierro PA-C   Neurology-Epilepsy  [facility]    Forms/Letters/Disability/DMV Paperwork: We understand the importance of filling out forms and providing letters in a timely manner.  However, many of these forms have very tricky language and once an official form is submitted as part of the medical record, it can not be modified or erased.  Please work with us in order to get these forms filled out in the most complete, accurate, and efficient way. 1.  Once you are aware that a form will need to be completed, please make an appointment.  A virtual appointment with Dr. James or Dali is perfectly fine. 2.  Please fill out the form as much as you can.  There are many questions that we do not have an answer for.  Please bring a blank copy of the form and your partially filled out form to the clinic visit or send them to us over the portal.  We will complete the form together with you during the clinic visit, sign it, and either return it to you or send it to the correct destination.  Every form will require an appointment however we can fill out multiple forms at once if needed.  Please do not hesitate to reach out with any questions or concerns about this policy.  We are trying to make sure that we have a system in place to meet this need which works for everyone involved.  Thank you  for your understanding.       Problem List Items Addressed This Visit          Neuro    Seizure disorder - Primary    Relevant Medications    carBAMazepine (TEGRETOL XR) 400 MG Tb12    gabapentin (NEURONTIN) 600 MG tablet    topiramate (TOPAMAX) 200 MG Tab    Other Relevant Orders    Topiramate level (Completed)    Carbamazepine level, total (Completed)    Gabapentin level (Completed)    Tuberous sclerosis       Pulmonary    Multiple lung nodules       Renal/    Renal mass       Genetic    Monoallelic mutation of TSC1 gene     Other Visit Diagnoses       Intellectual disability              Disclaimer: This note has been generated using voice-recognition software. There may be typographical errors that were missed during proof-reading.     LABS:  Recent Labs   Lab 03/17/21  0810 03/02/22  0700 11/07/22  1425 01/10/23  1004 03/30/23  1427 04/10/23  1345 08/25/23  1024   WBC 5.72 6.03   < > 5.54  --   --  5.71   Hemoglobin 12.4 12.1   < > 12.9  --   --  12.0   Hematocrit 37.0 37.0   < > 38.0  --   --  35.7 L   Platelets 272 259   < > 282  --   --  272   Sodium 140 136   < >  --  135 L 135 L 135 L   Potassium 4.0 4.0   < >  --  3.4 L 3.7 3.9   BUN 11 10   < >  --  8 8 12   Creatinine 0.7 0.7   < >  --  0.7 0.8 0.7   eGFR if African American >60.0 >60.0  --   --   --   --   --    eGFR if non African American >60.0 >60.0  --   --   --   --   --    Hemoglobin A1C  --   --   --   --  4.8  --   --    TSH 1.865 3.062  --   --  2.399 1.949  --    Vitamin B-12  --   --   --   --  1307 H  --   --     < > = values in this interval not displayed.       Recent Labs   Lab 11/07/22  0955   Topiramate Lvl 8.5   Carbamazepine Lvl 8.3        IMAGING:  Recent imaging is personally reviewed with the patient.    None in the system    PAST MEDICAL HISTORY:   Active Ambulatory Problems     Diagnosis Date Noted    Seizure disorder 08/10/2012    Kyphosis 08/26/2013    Severe obesity 10/31/2015    Fatty liver 10/21/2022    Renal mass  12/08/2022    Multiple lung nodules 12/08/2022    Body mass index (BMI) 40.0-44.9, adult 12/08/2022    Menstrual irregularity 03/30/2023    Hemorrhoid 08/28/2023    Tuberous sclerosis 10/05/2023    Monoallelic mutation of TSC1 gene 10/05/2023     Resolved Ambulatory Problems     Diagnosis Date Noted    Mental retardation 08/26/2013     Past Medical History:   Diagnosis Date    Allergy     Obesity 10/31/2015    Seizures         PAST SURGICAL HISTORY:   Past Surgical History:   Procedure Laterality Date    BIOPSY, WITH CT GUIDANCE Left 1/18/2023    Procedure: RENAL MASS BIOPSY, WITH CT GUIDANCE;  Surgeon: Luis Eduardo Nassar MD;  Location: [facility] CATH LAB;  Service: Radiology;  Laterality: Left;        ALLERGIES: Phenobarbital   CURRENT MEDICATIONS:   Current Outpatient Medications   Medication Sig Dispense Refill    ALPRAZolam (XANAX) 0.25 MG tablet Take 4 tablets (1 mg total) by mouth On call Procedure for Anxiety (prior to GYN exam). 1 tablet 0    b complex vitamins capsule Take 1 capsule by mouth 3 (three) times a week.      carBAMazepine (TEGRETOL XR) 400 MG Tb12 TAKE 1 TABLET(400 MG) BY MOUTH TWICE DAILY 180 tablet 3    cranberry 500 mg Cap Take by mouth.      cranberry fruit concentrate (AZO CRANBERRY ORAL) Take by mouth.      docusate sodium (COLACE) 100 MG capsule Take 100 mg by mouth 2 (two) times daily.      ferrous sulfate (FEOSOL) 325 mg (65 mg iron) Tab tablet Take 325 mg by mouth daily with breakfast.      fish oil-omega-3 fatty acids 300-1,000 mg capsule Take 1 g by mouth 2 (two) times daily.      folic acid (FOLVITE) 400 MCG tablet Take 400 mcg by mouth once daily.      gabapentin (NEURONTIN) 600 MG tablet Take 2 tablets (1,200 mg total) by mouth 3 (three) times daily. 540 tablet 3    hydrocortisone (ANUSOL-HC) 2.5 % rectal cream Place rectally 2 (two) times daily. 28 g 1    inulin (FIBER GUMMIES) 2 gram Chew Take by mouth.      multivitamin capsule Take 1 capsule by mouth once daily.      topiramate  (TOPAMAX) 200 MG Tab Take 1 tablet (200 mg total) by mouth 2 (two) times daily. 180 tablet 3    vitamin D 1000 units Tab Take 185 mg by mouth once daily.      VITAMIN E ACETATE ORAL Take by mouth.       No current facility-administered medications for this visit.        FAMILY HISTORY:   Family History   Problem Relation Age of Onset    Hyperlipidemia Mother     Hypertension Mother     Colonic polyp Mother         a couple in the '80s    Arrhythmia Father     Heart disease Father     Cholecystitis Sister     Cataracts Maternal Grandmother         in either this relative or this relative's mother    Macular degeneration Maternal Grandmother         WET - AMD    Hyperlipidemia Maternal Grandmother         later in life    Insomnia Maternal Grandmother     Hypertension Maternal Grandmother     Glaucoma Maternal Grandmother     Dementia Maternal Grandmother     Alzheimer's disease Maternal Grandmother     Heart disease Maternal Grandmother     Other Maternal Grandmother         benign breast tumor    Macular degeneration Maternal Grandfather         DRY - AMD    Vision loss Maternal Grandfather         corneal guttata (thin)    Hyperlipidemia Maternal Grandfather         later in life    Alzheimer's disease Maternal Grandfather     Hypertension Maternal Grandfather     Heart disease Paternal Grandmother     Emphysema Paternal Grandmother     Cervical cancer Paternal Grandmother     Diabetes Other         insulin-dependent    Breast cancer Other         age at dx unk    Lung cancer Other     Insomnia Maternal Aunt     Glaucoma Maternal Aunt     Coronary artery disease Maternal Aunt     Sleep apnea Maternal Uncle     Macular degeneration Maternal Uncle     Epilepsy Other     Tuberculosis Other     Heart attack Other     Heart attack Other     Heart disease Other     Glaucoma Other     Heart disease Other     Heart attack Other     Macular degeneration Other     Diverticulitis Other     Diabetes Other     Breast cancer  Other 68    Heart disease Other     Kidney disease Other     Dementia Other     Heart disease Other     Heart disease Other     Acne Neg Hx     Eczema Neg Hx     Lupus Neg Hx     Psoriasis Neg Hx     Melanoma Neg Hx     Colon cancer Neg Hx     Ovarian cancer Neg Hx          SOCIAL HISTORY:   Social History     Socioeconomic History    Marital status: Single   Occupational History    Occupation: Housekeeping   Tobacco Use    Smoking status: Never    Smokeless tobacco: Never   Substance and Sexual Activity    Alcohol use: Never    Drug use: Never    Sexual activity: Never     Social Determinants of Health     Financial Resource Strain: Low Risk  (8/23/2023)    Overall Financial Resource Strain (CARDIA)     Difficulty of Paying Living Expenses: Not hard at all   Food Insecurity: No Food Insecurity (8/23/2023)    Hunger Vital Sign     Worried About Running Out of Food in the Last Year: Never true     Ran Out of Food in the Last Year: Never true   Transportation Needs: No Transportation Needs (8/23/2023)    PRAPARE - Transportation     Lack of Transportation (Medical): No     Lack of Transportation (Non-Medical): No   Physical Activity: Insufficiently Active (8/23/2023)    Exercise Vital Sign     Days of Exercise per Week: 7 days     Minutes of Exercise per Session: 10 min   Stress: No Stress Concern Present (8/23/2023)    [employer] [place] [employer] - Occupational Stress Questionnaire     Feeling of Stress : Not at all   Social Connections: Moderately Isolated (8/23/2023)    Social Connection and Isolation Panel [NHANES]     Frequency of Communication with Friends and Family: More than three times a week     Frequency of Social Gatherings with Friends and Family: More than three times a week     Attends [employer] Services: More than 4 times per year     Active Member of Clubs or Organizations: No     Attends Club or Organization Meetings: Never     Marital Status: Never    Housing Stability: Low Risk   (8/23/2023)    Housing Stability Vital Sign     Unable to Pay for Housing in the Last Year: No     Number of Places Lived in the Last Year: 1     Unstable Housing in the Last Year: No      Questions and concerns raised by the patient and family/care-giver(s) were addressed and they indicated understanding of everything discussed and agreed to plans as above.    Kimberlee Fierro PA-C   Neurology-Epilepsy  [facility]    Collaborating physician, Dr. Nancy James, was available during today's encounter.     I spent approximately 44 minutes on the day of this encounter preparing to see the patient, obtaining and reviewing history and results, performing a medically appropriate exam, counseling and educating the patient/family/caregiver, documenting clinical information, coordinating care, and ordering medications, tests, procedures, and referrals.

## 2023-10-12 ENCOUNTER — DOCUMENTATION ONLY (OUTPATIENT)
Dept: HEMATOLOGY/ONCOLOGY | Facility: CLINIC | Age: 43
End: 2023-10-12
Payer: MEDICARE

## 2023-10-13 LAB
GABAPENTIN SERPLBLD-MCNC: 5.3 MCG/ML (ref 2–20)
TOPIRAMATE SERPL-MCNC: 4.8 MCG/ML

## 2023-10-13 RX ORDER — CARBAMAZEPINE 400 MG/1
400 TABLET, EXTENDED RELEASE ORAL 2 TIMES DAILY
Qty: 180 TABLET | Refills: 3 | Status: SHIPPED | OUTPATIENT
Start: 2023-10-13 | End: 2024-10-13

## 2023-10-13 RX ORDER — TOPIRAMATE 200 MG/1
200 TABLET ORAL 2 TIMES DAILY
Qty: 180 TABLET | Refills: 3 | Status: SHIPPED | OUTPATIENT
Start: 2023-10-13 | End: 2024-10-12

## 2023-10-13 RX ORDER — GABAPENTIN 600 MG/1
1200 TABLET ORAL 3 TIMES DAILY
Qty: 540 TABLET | Refills: 3 | Status: SHIPPED | OUTPATIENT
Start: 2023-10-13 | End: 2024-10-13

## 2023-11-08 ENCOUNTER — OFFICE VISIT (OUTPATIENT)
Dept: PULMONOLOGY | Facility: CLINIC | Age: 43
End: 2023-11-08
Payer: MEDICARE

## 2023-11-08 ENCOUNTER — HOSPITAL ENCOUNTER (OUTPATIENT)
Dept: PULMONOLOGY | Facility: CLINIC | Age: 43
Discharge: HOME | End: 2023-11-08
Payer: MEDICARE

## 2023-11-08 VITALS
HEIGHT: 65 IN | HEART RATE: 76 BPM | WEIGHT: 237.19 LBS | SYSTOLIC BLOOD PRESSURE: 106 MMHG | BODY MASS INDEX: 39.52 KG/M2 | DIASTOLIC BLOOD PRESSURE: 66 MMHG | OXYGEN SATURATION: 98 %

## 2023-11-08 DIAGNOSIS — R91.1 SOLITARY PULMONARY NODULE: ICD-10-CM

## 2023-11-08 DIAGNOSIS — Z15.89: ICD-10-CM

## 2023-11-08 DIAGNOSIS — Q85.1 TUBEROUS SCLEROSIS: ICD-10-CM

## 2023-11-08 DIAGNOSIS — R91.8 ABNORMAL FINDING ON LUNG IMAGING: ICD-10-CM

## 2023-11-08 DIAGNOSIS — R91.8 LUNG NODULES: Primary | ICD-10-CM

## 2023-11-08 PROCEDURE — 99999 PR PBB SHADOW E&M-EST. PATIENT-LVL IV: CPT | Mod: PBBFAC,,, | Performed by: INTERNAL MEDICINE

## 2023-11-08 PROCEDURE — 99214 OFFICE O/P EST MOD 30 MIN: CPT | Mod: 25,PBBFAC | Performed by: INTERNAL MEDICINE

## 2023-11-08 PROCEDURE — 99204 PR OFFICE/OUTPT VISIT, NEW, LEVL IV, 45-59 MIN: ICD-10-PCS | Mod: S$PBB,,, | Performed by: INTERNAL MEDICINE

## 2023-11-08 PROCEDURE — 99204 OFFICE O/P NEW MOD 45 MIN: CPT | Mod: S$PBB,,, | Performed by: INTERNAL MEDICINE

## 2023-11-08 PROCEDURE — 94010 BREATHING CAPACITY TEST: CPT | Mod: 53,PBBFAC | Performed by: INTERNAL MEDICINE

## 2023-11-08 PROCEDURE — 99999 PR PBB SHADOW E&M-EST. PATIENT-LVL IV: ICD-10-PCS | Mod: PBBFAC,,, | Performed by: INTERNAL MEDICINE

## 2023-11-08 NOTE — PROGRESS NOTES
"History & Physical  [facility]    SUBJECTIVE:     Chief Complaint:   Lung nodules    History of Present Illness:  Malorie Taylor is a 43 y.o. female who presents for evaluation of pulmonary nodules. 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 PARTIDA mostly occurs among patients with TSC2 mutations and is rare in patients with TSC1 mutations.     The pt is a lifetime nonsmoker. No history of childhood asthma.    Mother recounts that at age 6/when Malorie was in 1st grade is the point at which any sign of learning disability or seizure disorder began. While at [employer], all of a sudden Malorie got a "faraway look" in her eyes while eating cereal and flipped the cereal bowl upside-down; after this presumed seizure activity concluded, Malorie was fine for the remainder of the [employer] trip. Shortly thereafter, back home, Malorie's mother walked into Malorie's bedroom and noted Malorie convulsing, and at that point Malorie's siblings told their mother that Malorie does that "all the time."  As such, Malorie was taken to [facility] and underwent scans.  Around the same time as all of this, Malorie's 1st- told her parents that some days Malorie would do great with her class work while other days she wouldn't understand material she had previously understood.  Malorie was diagnosed with a "learning disability" that was not further labeled, according to her parents.     Malorie takes medications for her seizures. She has most recently seen Nancy James, PhD, and NATAN Al, with [facility].    Review of patient's allergies indicates:   Allergen Reactions    Phenobarbital      lethargy       Past Medical History:   Diagnosis Date    Allergy     Obesity 10/31/2015    Seizures     epilepsy     Past Surgical History:   Procedure Laterality " Date    BIOPSY, WITH CT GUIDANCE Left 1/18/2023    Procedure: RENAL MASS BIOPSY, WITH CT GUIDANCE;  Surgeon: Luis Eduardo Nassar MD;  Location: [facility] CATH LAB;  Service: Radiology;  Laterality: Left;     Family History   Problem Relation Age of Onset    Hyperlipidemia Mother     Hypertension Mother     Colonic polyp Mother         a couple in the '80s    Arrhythmia Father     Heart disease Father     Cholecystitis Sister     Cataracts Maternal Grandmother         in either this relative or this relative's mother    Macular degeneration Maternal Grandmother         WET - AMD    Hyperlipidemia Maternal Grandmother         later in life    Insomnia Maternal Grandmother     Hypertension Maternal Grandmother     Glaucoma Maternal Grandmother     Dementia Maternal Grandmother     Alzheimer's disease Maternal Grandmother     Heart disease Maternal Grandmother     Other Maternal Grandmother         benign breast tumor    Macular degeneration Maternal Grandfather         DRY - AMD    Vision loss Maternal Grandfather         corneal guttata (thin)    Hyperlipidemia Maternal Grandfather         later in life    Alzheimer's disease Maternal Grandfather     Hypertension Maternal Grandfather     Heart disease Paternal Grandmother     Emphysema Paternal Grandmother     Cervical cancer Paternal Grandmother     Diabetes Other         insulin-dependent    Breast cancer Other         age at dx unk    Lung cancer Other     Insomnia Maternal Aunt     Glaucoma Maternal Aunt     Coronary artery disease Maternal Aunt     Sleep apnea Maternal Uncle     Macular degeneration Maternal Uncle     Epilepsy Other     Tuberculosis Other     Heart attack Other     Heart attack Other     Heart disease Other     Glaucoma Other     Heart disease Other     Heart attack Other     Macular degeneration Other     Diverticulitis Other     Diabetes Other     Breast cancer Other 68    Heart disease Other     Kidney disease Other     Dementia Other     Heart  disease Other     Heart disease Other     Acne Neg Hx     Eczema Neg Hx     Lupus Neg Hx     Psoriasis Neg Hx     Melanoma Neg Hx     Colon cancer Neg Hx     Ovarian cancer Neg Hx      Social History     Socioeconomic History    Marital status: Single   Occupational History    Occupation: Housekeeping   Tobacco Use    Smoking status: Never    Smokeless tobacco: Never   Substance and Sexual Activity    Alcohol use: Never    Drug use: Never    Sexual activity: Never     Social Determinants of Health     Financial Resource Strain: Low Risk  (8/23/2023)    Overall Financial Resource Strain (CARDIA)     Difficulty of Paying Living Expenses: Not hard at all   Food Insecurity: No Food Insecurity (8/23/2023)    Hunger Vital Sign     Worried About Running Out of Food in the Last Year: Never true     Ran Out of Food in the Last Year: Never true   Transportation Needs: No Transportation Needs (8/23/2023)    PRAPARE - Transportation     Lack of Transportation (Medical): No     Lack of Transportation (Non-Medical): No   Physical Activity: Insufficiently Active (8/23/2023)    Exercise Vital Sign     Days of Exercise per Week: 7 days     Minutes of Exercise per Session: 10 min   Stress: No Stress Concern Present (8/23/2023)    [employer] [place] [employer] - Occupational Stress Questionnaire     Feeling of Stress : Not at all   Social Connections: Moderately Isolated (8/23/2023)    Social Connection and Isolation Panel [NHANES]     Frequency of Communication with Friends and Family: More than three times a week     Frequency of Social Gatherings with Friends and Family: More than three times a week     Attends Judaism Services: More than 4 times per year     Active Member of Clubs or Organizations: No     Attends Club or Organization Meetings: Never     Marital Status: Never    Housing Stability: Low Risk  (8/23/2023)    Housing Stability Vital Sign     Unable to Pay for Housing in the Last Year: No     Number  "of Places Lived in the Last Year: 1     Unstable Housing in the Last Year: No     Review of Systems:  No pertinent positives    OBJECTIVE:     Vital Signs  Vitals:    11/08/23 1044 11/08/23 1048   BP:  106/66   Pulse: 76    SpO2: 98%    Weight: 107.6 kg (237 lb 3.4 oz)    Height: 5' 5" (1.651 m)      Body mass index is 39.47 kg/m².    Physical Exam:  General: no distress  Eyes:  conjunctivae/corneas clear  Nose: no discharge  Neck: trachea midline with no masses appreciated  Lungs:  normal respiratory effort, no wheezes, no rales  Heart: regular rate and rhythm and no murmur  Abdomen: non-distended  Extremities: no cyanosis, no edema, no clubbing  Skin: No rashes or lesions. good skin turgor  Neurologic: alert, oriented, thought content appropriate    Laboratory:  Lab Results   Component Value Date    WBC 5.71 08/25/2023    HGB 12.0 08/25/2023    HCT 35.7 (L) 08/25/2023     (H) 08/25/2023     08/25/2023     Chest Imaging, My Impression:   CT abd 8/2023: there are multiple small lung nodules    Diagnostic Results:  PFT is pending    ASSESSMENT/PLAN:     1) Lung nodules  - Obtain dedicated chest CT. Will call patient & mother to discuss results of CT & provide additional recommendations.    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  - PARTIDA mostly occurs among patients with TSC2 mutations and is rare in patients with TSC1 mutations.  - Screening is recommended. Will assess with chest CT. Attempted PFT, but the patient was unable to follow the instructions given in a manner that would produce a valid result.     Total professional time spent for the encounter: 45 minutes  Time was spent preparing to see the patient, reviewing results of prior testing, obtaining and/or reviewing separately obtained history, performing a medically appropriate examination and interview, counseling and educating the patient/family, ordering medications/tests/procedures, " referring and communicating with other health care professionals, documenting clinical information in the electronic health record, and independently interpreting results.    Morton [place], MD  [facility]       Spontaneous right buttock hematoma in setting of coagulapathy - no sign and symptoms suggestive of compartment syndrome   will monitor cbc closely   FFP and monitor INR   USG noted.  If hematoma gets bigger and Hb drop then consider vascular consult

## 2023-11-16 ENCOUNTER — HOSPITAL ENCOUNTER (OUTPATIENT)
Dept: RADIOLOGY | Facility: HOSPITAL | Age: 43
Discharge: HOME | End: 2023-11-16
Attending: INTERNAL MEDICINE
Payer: MEDICARE

## 2023-11-16 DIAGNOSIS — R91.8 LUNG NODULES: ICD-10-CM

## 2023-11-16 PROCEDURE — 71250 CT CHEST WITHOUT CONTRAST: ICD-10-PCS | Mod: 26,,, | Performed by: RADIOLOGY

## 2023-11-16 PROCEDURE — 71250 CT THORAX DX C-: CPT | Mod: 26,,, | Performed by: RADIOLOGY

## 2023-11-16 PROCEDURE — 71250 CT THORAX DX C-: CPT | Mod: TC

## 2023-11-22 ENCOUNTER — TELEPHONE (OUTPATIENT)
Dept: PULMONOLOGY | Facility: CLINIC | Age: 43
End: 2023-11-22
Payer: MEDICARE

## 2023-11-22 NOTE — TELEPHONE ENCOUNTER
Reviewed CT with pt's mom. 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 Interesting finding but no specific treatment is needed & this is unlikely to cause symptoms for the pt. Recommend repeat CT in one year.    Alvaro Reid MD

## 2023-11-30 ENCOUNTER — OFFICE VISIT (OUTPATIENT)
Dept: CARDIOLOGY | Facility: CLINIC | Age: 43
End: 2023-11-30
Payer: MEDICARE

## 2023-11-30 VITALS
HEART RATE: 65 BPM | SYSTOLIC BLOOD PRESSURE: 114 MMHG | HEIGHT: 65 IN | DIASTOLIC BLOOD PRESSURE: 58 MMHG | OXYGEN SATURATION: 100 % | BODY MASS INDEX: 39.54 KG/M2 | WEIGHT: 237.31 LBS

## 2023-11-30 DIAGNOSIS — Z15.89: ICD-10-CM

## 2023-11-30 DIAGNOSIS — Q85.1 TUBEROUS SCLEROSIS: ICD-10-CM

## 2023-11-30 PROCEDURE — 99214 OFFICE O/P EST MOD 30 MIN: CPT | Mod: PBBFAC | Performed by: INTERNAL MEDICINE

## 2023-11-30 PROCEDURE — 99204 PR OFFICE/OUTPT VISIT, NEW, LEVL IV, 45-59 MIN: ICD-10-PCS | Mod: S$PBB,,, | Performed by: INTERNAL MEDICINE

## 2023-11-30 PROCEDURE — 99204 OFFICE O/P NEW MOD 45 MIN: CPT | Mod: S$PBB,,, | Performed by: INTERNAL MEDICINE

## 2023-11-30 PROCEDURE — 93010 EKG 12-LEAD: ICD-10-PCS | Mod: ,,, | Performed by: INTERNAL MEDICINE

## 2023-11-30 PROCEDURE — 99999 PR PBB SHADOW E&M-EST. PATIENT-LVL IV: CPT | Mod: PBBFAC,,, | Performed by: INTERNAL MEDICINE

## 2023-11-30 PROCEDURE — 93005 ELECTROCARDIOGRAM TRACING: CPT

## 2023-11-30 PROCEDURE — 93010 ELECTROCARDIOGRAM REPORT: CPT | Mod: ,,, | Performed by: INTERNAL MEDICINE

## 2023-11-30 PROCEDURE — 99999 PR PBB SHADOW E&M-EST. PATIENT-LVL IV: ICD-10-PCS | Mod: PBBFAC,,, | Performed by: INTERNAL MEDICINE

## 2023-11-30 NOTE — PROGRESS NOTES
[facility]    Chief Complaint  Cardiac evaluation for tuberous sclerosis    HPI:    Patient was recently diagnosed with tuberous sclerosis.  Sent for cardiac evaluation.  Denies prior cardiac problems or workup.  Seen with her mother today.  No problems with palpitations, syncope, or presyncope.  No exertional dyspnea.  No chest discomfort.    Medications  Current Outpatient Medications   Medication Sig Dispense Refill    b complex vitamins capsule Take 1 capsule by mouth 3 (three) times a week.      carBAMazepine (TEGRETOL XR) 400 MG Tb12 Take 1 tablet (400 mg total) by mouth 2 (two) times daily. 180 tablet 3    cranberry 500 mg Cap Take by mouth.      cranberry fruit concentrate (AZO CRANBERRY ORAL) Take by mouth.      ferrous sulfate (FEOSOL) 325 mg (65 mg iron) Tab tablet Take 325 mg by mouth daily with breakfast.      fish oil-omega-3 fatty acids 300-1,000 mg capsule Take 1 g by mouth 2 (two) times daily.      folic acid (FOLVITE) 400 MCG tablet Take 400 mcg by mouth once daily.      gabapentin (NEURONTIN) 600 MG tablet Take 2 tablets (1,200 mg total) by mouth 3 (three) times daily. 540 tablet 3    hydrocortisone (ANUSOL-HC) 2.5 % rectal cream Place rectally 2 (two) times daily. 28 g 1    inulin (FIBER GUMMIES) 2 gram Chew Take by mouth.      multivitamin capsule Take 1 capsule by mouth once daily.      topiramate (TOPAMAX) 200 MG Tab Take 1 tablet (200 mg total) by mouth 2 (two) times daily. 180 tablet 3    vitamin D 1000 units Tab Take 185 mg by mouth once daily.      VITAMIN E ACETATE ORAL Take by mouth.      ALPRAZolam (XANAX) 0.25 MG tablet Take 4 tablets (1 mg total) by mouth On call Procedure for Anxiety (prior to GYN exam). 1 tablet 0    docusate sodium (COLACE) 100 MG capsule Take 100 mg by mouth 2 (two) times daily.       No current facility-administered medications for this visit.        History  Past Medical History:   Diagnosis Date    Allergy     Obesity 10/31/2015    Seizures      epilepsy     Past Surgical History:   Procedure Laterality Date    BIOPSY, WITH CT GUIDANCE Left 1/18/2023    Procedure: RENAL MASS BIOPSY, WITH CT GUIDANCE;  Surgeon: Luis Eduardo Nassar MD;  Location: [facility], [place] CATH LAB;  Service: Radiology;  Laterality: Left;     Social History     Socioeconomic History    Marital status: Single   Occupational History    Occupation: Housekeeping   Tobacco Use    Smoking status: Never    Smokeless tobacco: Never   Substance and Sexual Activity    Alcohol use: Never    Drug use: Never    Sexual activity: Never     Social Determinants of Health     Financial Resource Strain: Low Risk  (8/23/2023)    Overall Financial Resource Strain (CARDIA)     Difficulty of Paying Living Expenses: Not hard at all   Food Insecurity: No Food Insecurity (8/23/2023)    Hunger Vital Sign     Worried About Running Out of Food in the Last Year: Never true     Ran Out of Food in the Last Year: Never true   Transportation Needs: No Transportation Needs (8/23/2023)    PRAPARE - Transportation     Lack of Transportation (Medical): No     Lack of Transportation (Non-Medical): No   Physical Activity: Insufficiently Active (8/23/2023)    Exercise Vital Sign     Days of Exercise per Week: 7 days     Minutes of Exercise per Session: 10 min   Stress: No Stress Concern Present (8/23/2023)    [place] [employer] - Occupational Stress Questionnaire     Feeling of Stress : Not at all   Social Connections: Moderately Isolated (8/23/2023)    Social Connection and Isolation Panel [NHANES]     Frequency of Communication with Friends and Family: More than three times a week     Frequency of Social Gatherings with Friends and Family: More than three times a week     Attends Anabaptist Services: More than 4 times per year     Active Member of Clubs or Organizations: No     Attends Club or Organization Meetings: Never     Marital Status: Never    Housing Stability: Low Risk  (8/23/2023)    Housing  Stability Vital Sign     Unable to Pay for Housing in the Last Year: No     Number of Places Lived in the Last Year: 1     Unstable Housing in the Last Year: No     Family History   Problem Relation Age of Onset    Hyperlipidemia Mother     Hypertension Mother     Colonic polyp Mother         a couple in the '80s    Arrhythmia Father     Heart disease Father     Cholecystitis Sister     Cataracts Maternal Grandmother         in either this relative or this relative's mother    Macular degeneration Maternal Grandmother         WET - AMD    Hyperlipidemia Maternal Grandmother         later in life    Insomnia Maternal Grandmother     Hypertension Maternal Grandmother     Glaucoma Maternal Grandmother     Dementia Maternal Grandmother     Alzheimer's disease Maternal Grandmother     Heart disease Maternal Grandmother     Other Maternal Grandmother         benign breast tumor    Macular degeneration Maternal Grandfather         DRY - AMD    Vision loss Maternal Grandfather         corneal guttata (thin)    Hyperlipidemia Maternal Grandfather         later in life    Alzheimer's disease Maternal Grandfather     Hypertension Maternal Grandfather     Heart disease Paternal Grandmother     Emphysema Paternal Grandmother     Cervical cancer Paternal Grandmother     Diabetes Other         insulin-dependent    Breast cancer Other         age at dx unk    Lung cancer Other     Insomnia Maternal Aunt     Glaucoma Maternal Aunt     Coronary artery disease Maternal Aunt     Sleep apnea Maternal Uncle     Macular degeneration Maternal Uncle     Epilepsy Other     Tuberculosis Other     Heart attack Other     Heart attack Other     Heart disease Other     Glaucoma Other     Heart disease Other     Heart attack Other     Macular degeneration Other     Diverticulitis Other     Diabetes Other     Breast cancer Other 68    Heart disease Other     Kidney disease Other     Dementia Other     Heart disease Other     Heart disease Other      Acne Neg Hx     Eczema Neg Hx     Lupus Neg Hx     Psoriasis Neg Hx     Melanoma Neg Hx     Colon cancer Neg Hx     Ovarian cancer Neg Hx         Allergies  Review of patient's allergies indicates:   Allergen Reactions    Phenobarbital      lethargy       Review of Systems   Review of Systems   Constitutional: Negative for malaise/fatigue.   Cardiovascular:  Negative for chest pain, dyspnea on exertion, irregular heartbeat, leg swelling, near-syncope, orthopnea, palpitations, paroxysmal nocturnal dyspnea and syncope.   Respiratory:  Negative for cough, shortness of breath and wheezing.    Hematologic/Lymphatic: Negative for bleeding problem.   Gastrointestinal:  Negative for constipation, nausea and vomiting.   Neurological:  Negative for dizziness.       Physical Exam  Vitals:    11/30/23 0917   BP: (!) 114/58   Pulse: 65     Wt Readings from Last 1 Encounters:   11/30/23 107.6 kg (237 lb 4.8 oz)     Physical Exam  Vitals and nursing note reviewed.   Constitutional:       General: She is not in acute distress.     Appearance: She is obese. She is not toxic-appearing or diaphoretic.   HENT:      Head: Normocephalic and atraumatic.      Mouth/Throat:      Lips: Pink.      Mouth: Mucous membranes are moist.   Eyes:      General: No scleral icterus.     Conjunctiva/sclera: Conjunctivae normal.   Neck:      Thyroid: No thyromegaly.      Vascular: No carotid bruit, hepatojugular reflux or JVD.      Trachea: Trachea normal.   Cardiovascular:      Rate and Rhythm: Normal rate and regular rhythm. No extrasystoles are present.     Chest Wall: PMI is not displaced.      Pulses:           Carotid pulses are 2+ on the right side and 2+ on the left side.       Radial pulses are 2+ on the right side and 2+ on the left side.        Dorsalis pedis pulses are 2+ on the right side and 2+ on the left side.        Posterior tibial pulses are 2+ on the right side and 2+ on the left side.      Heart sounds: S1 normal and S2 normal. No  murmur heard.     No friction rub. No S3 or S4 sounds.   Pulmonary:      Effort: Pulmonary effort is normal. No accessory muscle usage or respiratory distress.      Breath sounds: Normal breath sounds and air entry. No decreased breath sounds, wheezing, rhonchi or rales.   Abdominal:      General: Bowel sounds are normal. There is no distension or abdominal bruit.      Palpations: Abdomen is soft. There is no hepatomegaly, splenomegaly or pulsatile mass.      Tenderness: There is no abdominal tenderness.   Musculoskeletal:         General: No tenderness or deformity.      Right lower leg: No edema.      Left lower leg: No edema.   Skin:     General: Skin is warm and dry.      Capillary Refill: Capillary refill takes less than 2 seconds.      Coloration: Skin is not cyanotic or pale.      Nails: There is no clubbing.   Neurological:      General: No focal deficit present.      Mental Status: She is alert and oriented to person, place, and time.   Psychiatric:         Attention and Perception: Attention normal.         Mood and Affect: Mood normal.         Speech: Speech normal.         Behavior: Behavior normal. Behavior is cooperative.         Labs  Lab Visit on 10/11/2023   Component Date Value Ref Range Status    Topiramate Lvl 10/11/2023 4.8  mcg/mL Final    Comment: -------------------REFERENCE VALUE--------------------------  Reference values depend on clinical use:    Anticonvulsant: 5.0-20.0 mcg/mL    -------------------ADDITIONAL INFORMATION-------------------  This test was developed and its performance characteristics   determined by [facility] in a manner consistent with CLIA   requirements. This test has not been cleared or approved by   the U.S. Food and Drug Administration.    Test Performed by:  [facility] [id number] [place] [address]  : Bernard Chua M.D. Ph.D.; CLIA# [id number]      Carbamazepine Lvl 10/11/2023 8.0  4.0 - 12.0  ug/mL Final    Gabapentin Lvl 10/11/2023 5.3  2.0 - 20.0 mcg/mL Final    Comment: -------------------ADDITIONAL INFORMATION-------------------  This test was developed and its performance characteristics   determined by [facility] in a manner consistent with CLIA   requirements. This test has not been cleared or approved by   the U.S. Food and Drug Administration.    Test Performed by:  [facility]  [address] [place] [address]  : Bernard Chua M.D. Ph.D.; CLIA# [id number]     Lab Visit on 09/06/2023   Component Date Value Ref Range Status    Miscellaneous Genetic Test Name 09/06/2023 [facility] He[facility]itary Cancer   Final    Genso Specimen Type 09/06/2023 Blood   Final    Genetic counseling? 09/06/2023 Yes   Final    Parental or Sibling Testing? 09/06/2023 No   Final    Test Result 09/06/2023 See result image under hyperlink   Final    Reference Lab 09/06/2023 SEE COMMENT   Final    Comment: [facility]  [address]  [address]  [place] [address]     Lab Visit on 08/25/2023   Component Date Value Ref Range Status    Sodium 08/25/2023 135 (L)  136 - 145 mmol/L Final    Potassium 08/25/2023 3.9  3.5 - 5.1 mmol/L Final    Chloride 08/25/2023 104  95 - 110 mmol/L Final    CO2 08/25/2023 23  23 - 29 mmol/L Final    Glucose 08/25/2023 81  70 - 110 mg/dL Final    BUN 08/25/2023 12  6 - 20 mg/dL Final    Creatinine 08/25/2023 0.7  0.5 - 1.4 mg/dL Final    Calcium 08/25/2023 8.9  8.7 - 10.5 mg/dL Final    Total Protein 08/25/2023 7.6  6.0 - 8.4 g/dL Final    Albumin 08/25/2023 3.6  3.5 - 5.2 g/dL Final    Total Bilirubin 08/25/2023 0.2  0.1 - 1.0 mg/dL Final    Comment: For infants and newborns, interpretation of results should be based  on gestational age, weight and in agreement with clinical  observations.    Premature Infant recommended reference ranges:  Up to 24 hours.............<8.0 mg/dL  Up to 48 hours............<12.0 mg/dL  3-5  days..................<15.0 mg/dL  6-29 days.................<15.0 mg/dL      Alkaline Phosphatase 08/25/2023 117  55 - 135 U/L Final    AST 08/25/2023 17  10 - 40 U/L Final    ALT 08/25/2023 17  10 - 44 U/L Final    eGFR 08/25/2023 >60  >60 mL/min/1.73 m^2 Final    Anion Gap 08/25/2023 8  8 - 16 mmol/L Final    WBC 08/25/2023 5.71  3.90 - 12.70 K/uL Final    RBC 08/25/2023 3.57 (L)  4.00 - 5.40 M/uL Final    Hemoglobin 08/25/2023 12.0  12.0 - 16.0 g/dL Final    Hematocrit 08/25/2023 35.7 (L)  37.0 - 48.5 % Final    MCV 08/25/2023 100 (H)  82 - 98 fL Final    MCH 08/25/2023 33.6 (H)  27.0 - 31.0 pg Final    MCHC 08/25/2023 33.6  32.0 - 36.0 g/dL Final    RDW 08/25/2023 11.7  11.5 - 14.5 % Final    Platelets 08/25/2023 272  150 - 450 K/uL Final    MPV 08/25/2023 8.3 (L)  9.2 - 12.9 fL Final    Immature Granulocytes 08/25/2023 0.4  0.0 - 0.5 % Final    Gran # (ANC) 08/25/2023 3.4  1.8 - 7.7 K/uL Final    Immature Grans (Abs) 08/25/2023 0.02  0.00 - 0.04 K/uL Final    Comment: Mild elevation in immature granulocytes is non specific and   can be seen in a variety of conditions including stress response,   acute inflammation, trauma and pregnancy. Correlation with other   laboratory and clinical findings is essential.      Lymph # 08/25/2023 1.8  1.0 - 4.8 K/uL Final    Mono # 08/25/2023 0.4  0.3 - 1.0 K/uL Final    Eos # 08/25/2023 0.1  0.0 - 0.5 K/uL Final    Baso # 08/25/2023 0.02  0.00 - 0.20 K/uL Final    nRBC 08/25/2023 0  0 /100 WBC Final    Gran % 08/25/2023 58.9  38.0 - 73.0 % Final    Lymph % 08/25/2023 31.7  18.0 - 48.0 % Final    Mono % 08/25/2023 7.0  4.0 - 15.0 % Final    Eosinophil % 08/25/2023 1.6  0.0 - 8.0 % Final    Basophil % 08/25/2023 0.4  0.0 - 1.9 % Final    Differential Method 08/25/2023 Automated   Final       Imaging  CT Chest Without Contrast    Result Date: 11/20/2023  EXAMINATION: CT CHEST WITHOUT CONTRAST CLINICAL HISTORY: Abnormal xray - lung nodule, < 1 cm, low risk; Other nonspecific  abnormal finding of lung field TECHNIQUE: Low dose axial images, sagittal and coronal reformations were obtained from the thoracic inlet to the lung bases. Contrast was not administered. COMPARISON: 08/25/2023 FINDINGS: There is a 1 cm right thyroid nodule.  Otherwise, soft tissue structures at the base of the neck show no significant abnormalities. There is no abnormal axillary or mediastinal lymph node enlargement.  There is trace pericardial fluid.  Heart is upper normal in size.  Thoracic aorta is normal in caliber without significant atherosclerosis. The trachea and central airways are patent.  No lobar consolidation, large effusion or pneumothorax.  There is considerable respiratory motion artifact in the lungs.  There are innumerable subcentimeter pulmonary nodules.  The distribution appears random with slight upper lobe predominance.  Largest nodule on the right measures 0.6 cm (image 185 series 4) and largest nodule in the left upper lobe measures 0.7 cm (image 94 series 4).  Some of the nodules are subsolid. There is a small hiatal hernia.  Otherwise, the esophagus is normal caliber and course. Limited review of the upper abdomen shows no acute abnormalities on noncontrast imaging.  There is a hypodensity near the hepatic dome which is not significantly changed from prior.  Patient's known left renal mass not well visualized on noncontrast imaging. Osseous structures show no acute abnormalities.  Accentuated kyphosis at the thoracic lumbar junction.     Innumerable solid and subsolid subcentimeter pulmonary nodules.  Given patient's history of tuberous sclerosis, multifocal micronodular pneumocyte hyperplasia (MMPH) is a consideration although not entirely specific.  The differential includes infectious, inflammatory and metastatic disease.  Surveillance is recommended with surveillance interval to be determined by pulmonary medicine. This report was flagged in Epic as abnormal. Electronically signed  by: Dylan Atkinson MD Date:    11/20/2023 Time:    01:36      Assessment  1. Tuberous sclerosis  - Ambulatory referral/consult to Cardiology  - Echo; Future    2. Monoallelic mutation of TSC1 gene  - Ambulatory referral/consult to Cardiology      Plan and Discussion    Cardiac involvement seems unlikely at this age without prior symptoms.  Electrocardiogram shows no conduction system abnormalities.  Will get an echocardiogram to assess cardiac structure.      The 10-year ASCVD risk score (Mallorie GONZALEZ, et al., 2019) is: 0.6%    Values used to calculate the score:      Age: 43 years      Sex: Female      Is Non- : No      Diabetic: No      Tobacco smoker: No      Systolic Blood Pressure: 114 mmHg      Is BP treated: No      HDL Cholesterol: 56 mg/dL      Total Cholesterol: 219 mg/dL         Julio Mazariegos MD

## 2023-12-01 ENCOUNTER — HOSPITAL ENCOUNTER (OUTPATIENT)
Dept: CARDIOLOGY | Facility: OTHER | Age: 43
Discharge: HOME | End: 2023-12-01
Attending: INTERNAL MEDICINE
Payer: MEDICARE

## 2023-12-01 VITALS
HEART RATE: 65 BPM | SYSTOLIC BLOOD PRESSURE: 114 MMHG | WEIGHT: 237 LBS | BODY MASS INDEX: 39.49 KG/M2 | HEIGHT: 65 IN | DIASTOLIC BLOOD PRESSURE: 58 MMHG

## 2023-12-01 DIAGNOSIS — Q85.1 TUBEROUS SCLEROSIS: ICD-10-CM

## 2023-12-01 LAB
ASCENDING AORTA: 2.96 CM
AV INDEX (PROSTH): 0.86
AV MEAN GRADIENT: 5 MMHG
AV PEAK GRADIENT: 8 MMHG
AV VALVE AREA BY VELOCITY RATIO: 2.8 CM²
AV VALVE AREA: 3.02 CM²
AV VELOCITY RATIO: 0.79
BSA FOR ECHO PROCEDURE: 2.22 M2
CV ECHO LV RWT: 0.41 CM
DOP CALC AO PEAK VEL: 1.41 M/S
DOP CALC AO VTI: 31.2 CM
DOP CALC LVOT AREA: 3.5 CM2
DOP CALC LVOT DIAMETER: 2.12 CM
DOP CALC LVOT PEAK VEL: 1.12 M/S
DOP CALC LVOT STROKE VOLUME: 94.2 CM3
DOP CALC RVOT PEAK VEL: 0.85 M/S
DOP CALC RVOT VTI: 19.9 CM
DOP CALCLVOT PEAK VEL VTI: 26.7 CM
E WAVE DECELERATION TIME: 204.24 MSEC
E/A RATIO: 1.38
E/E' RATIO: 6.16 M/S
ECHO LV POSTERIOR WALL: 1.01 CM (ref 0.6–1.1)
FRACTIONAL SHORTENING: 28 % (ref 28–44)
GLOBAL LONGITUIDAL STRAIN: 16.5 %
INTERVENTRICULAR SEPTUM: 1.09 CM (ref 0.6–1.1)
IVC DIAMETER: 1.08 CM
IVRT: 98.95 MSEC
LA MAJOR: 5.46 CM
LA MINOR: 5.28 CM
LA WIDTH: 4 CM
LEFT ATRIUM SIZE: 3.8 CM
LEFT ATRIUM VOLUME INDEX MOD: 29.1 ML/M2
LEFT ATRIUM VOLUME INDEX: 32.6 ML/M2
LEFT ATRIUM VOLUME MOD: 62.07 CM3
LEFT ATRIUM VOLUME: 69.36 CM3
LEFT INTERNAL DIMENSION IN SYSTOLE: 3.54 CM (ref 2.1–4)
LEFT VENTRICLE DIASTOLIC VOLUME INDEX: 53.44 ML/M2
LEFT VENTRICLE DIASTOLIC VOLUME: 113.82 ML
LEFT VENTRICLE MASS INDEX: 89 G/M2
LEFT VENTRICLE SYSTOLIC VOLUME INDEX: 24.6 ML/M2
LEFT VENTRICLE SYSTOLIC VOLUME: 52.39 ML
LEFT VENTRICULAR INTERNAL DIMENSION IN DIASTOLE: 4.92 CM (ref 3.5–6)
LEFT VENTRICULAR MASS: 189.34 G
LV LATERAL E/E' RATIO: 5.13 M/S
LV SEPTAL E/E' RATIO: 7.7 M/S
LVOT MG: 2.55 MMHG
LVOT MV: 0.74 CM/S
MV PEAK A VEL: 0.56 M/S
MV PEAK E VEL: 0.77 M/S
MV STENOSIS PRESSURE HALF TIME: 59.23 MS
MV VALVE AREA P 1/2 METHOD: 3.71 CM2
PISA MRMAX VEL: 3.03 M/S
PISA TR MAX VEL: 2.01 M/S
PULM VEIN S/D RATIO: 1.78
PV MEAN GRADIENT: 2 MMHG
PV PEAK D VEL: 0.4 M/S
PV PEAK GRADIENT: 5 MMHG
PV PEAK S VEL: 0.71 M/S
PV PEAK VELOCITY: 1.12 M/S
RA MAJOR: 5.19 CM
RA PRESSURE ESTIMATED: 3 MMHG
RA WIDTH: 3.6 CM
RIGHT VENTRICULAR END-DIASTOLIC DIMENSION: 3.4 CM
RV TB RVSP: 5 MMHG
RV TISSUE DOPPLER FREE WALL SYSTOLIC VELOCITY 1 (APICAL 4 CHAMBER VIEW): 11.48 CM/S
SINUS: 2.8 CM
STJ: 2.57 CM
TDI LATERAL: 0.15 M/S
TDI SEPTAL: 0.1 M/S
TDI: 0.13 M/S
TR MAX PG: 16 MMHG
TRICUSPID ANNULAR PLANE SYSTOLIC EXCURSION: 1.8 CM
TV REST PULMONARY ARTERY PRESSURE: 19 MMHG
Z-SCORE OF LEFT VENTRICULAR DIMENSION IN END DIASTOLE: -3.22
Z-SCORE OF LEFT VENTRICULAR DIMENSION IN END SYSTOLE: -1.24

## 2023-12-01 PROCEDURE — 93356 MYOCRD STRAIN IMG SPCKL TRCK: CPT

## 2023-12-01 PROCEDURE — 93356 ECHO (CUPID ONLY): ICD-10-PCS | Mod: ,,, | Performed by: INTERNAL MEDICINE

## 2023-12-01 PROCEDURE — 93356 MYOCRD STRAIN IMG SPCKL TRCK: CPT | Mod: ,,, | Performed by: INTERNAL MEDICINE

## 2023-12-01 PROCEDURE — 93306 ECHO (CUPID ONLY): ICD-10-PCS | Mod: 26,,, | Performed by: INTERNAL MEDICINE

## 2023-12-01 PROCEDURE — 93306 TTE W/DOPPLER COMPLETE: CPT | Mod: 26,,, | Performed by: INTERNAL MEDICINE

## 2023-12-05 ENCOUNTER — OFFICE VISIT (OUTPATIENT)
Dept: DERMATOLOGY | Facility: CLINIC | Age: 43
End: 2023-12-05
Payer: MEDICARE

## 2023-12-05 DIAGNOSIS — L91.8 SKIN TAG: ICD-10-CM

## 2023-12-05 DIAGNOSIS — Z12.83 SCREENING EXAM FOR SKIN CANCER: ICD-10-CM

## 2023-12-05 DIAGNOSIS — Q85.1 TUBEROUS SCLEROSIS: ICD-10-CM

## 2023-12-05 DIAGNOSIS — D22.9 MULTIPLE BENIGN NEVI: ICD-10-CM

## 2023-12-05 DIAGNOSIS — L71.9 ROSACEA: Primary | ICD-10-CM

## 2023-12-05 DIAGNOSIS — L82.1 SEBORRHEIC KERATOSES: ICD-10-CM

## 2023-12-05 DIAGNOSIS — D18.01 CHERRY ANGIOMA: ICD-10-CM

## 2023-12-05 DIAGNOSIS — Z15.89: ICD-10-CM

## 2023-12-05 PROCEDURE — 99999 PR PBB SHADOW E&M-EST. PATIENT-LVL III: CPT | Mod: PBBFAC,,, | Performed by: STUDENT IN AN ORGANIZED HEALTH CARE EDUCATION/TRAINING PROGRAM

## 2023-12-05 PROCEDURE — 99204 PR OFFICE/OUTPT VISIT, NEW, LEVL IV, 45-59 MIN: ICD-10-PCS | Mod: S$PBB,,, | Performed by: STUDENT IN AN ORGANIZED HEALTH CARE EDUCATION/TRAINING PROGRAM

## 2023-12-05 PROCEDURE — 99213 OFFICE O/P EST LOW 20 MIN: CPT | Mod: PBBFAC | Performed by: STUDENT IN AN ORGANIZED HEALTH CARE EDUCATION/TRAINING PROGRAM

## 2023-12-05 PROCEDURE — 99204 OFFICE O/P NEW MOD 45 MIN: CPT | Mod: S$PBB,,, | Performed by: STUDENT IN AN ORGANIZED HEALTH CARE EDUCATION/TRAINING PROGRAM

## 2023-12-05 PROCEDURE — 99999 PR PBB SHADOW E&M-EST. PATIENT-LVL III: ICD-10-PCS | Mod: PBBFAC,,, | Performed by: STUDENT IN AN ORGANIZED HEALTH CARE EDUCATION/TRAINING PROGRAM

## 2023-12-05 RX ORDER — METRONIDAZOLE 7.5 MG/G
GEL TOPICAL
Qty: 45 G | Refills: 1 | Status: SHIPPED | OUTPATIENT
Start: 2023-12-05 | End: 2024-04-03

## 2023-12-05 NOTE — PROGRESS NOTES
Subjective:      Patient ID:  Malorie Taylor is a 43 y.o. female who presents for   Chief Complaint   Patient presents with    Skin Check     TBSE     Malorie Taylor is a 43 y.o. female who presents for: FBSE screening exam for skin cancer, h/o tuberous sclerosis   Pt accompanied with her mother.    Last seen by derm on 4/20/16 by NP for skin check f/u. Referred by Juanpablo Hernandez DNP due to tuberous sclerosis diagnosis.     The patient has no specific concerns today.    Pertinent history:  History of blistering sunburns: No  History of tanning bed use: No  Family history of melanoma: No  Personal history of mole removal: No  Personal history of skin cancer: No         Review of Systems   Skin:  Positive for activity-related sunscreen use. Negative for daily sunscreen use and recent sunburn.   Hematologic/Lymphatic: Does not bruise/bleed easily.       Objective:   Physical Exam   Constitutional: She appears well-developed and well-nourished. No distress.   Neurological: She is alert and oriented to person, place, and time. She is not disoriented.   Psychiatric: She has a normal mood and affect.   Skin:   Areas Examined (abnormalities noted in diagram):   Scalp / Hair Palpated and Inspected  Head / Face Inspection Performed  Neck Inspection Performed  Chest / Axilla Inspection Performed  Abdomen Inspection Performed  Genitals / Buttocks / Groin Inspection Performed  Back Inspection Performed  RUE Inspected  LUE Inspection Performed  RLE Inspected  LLE Inspection Performed  Nails and Digits Inspection Performed            Diagram Legend     Erythematous scaling macule/papule c/w actinic keratosis       Vascular papule c/w angioma      Pigmented verrucoid papule/plaque c/w seborrheic keratosis      Yellow umbilicated papule c/w sebaceous hyperplasia      Irregularly shaped tan macule c/w lentigo     1-2 mm smooth white papules consistent with Milia      Movable subcutaneous cyst with punctum c/w epidermal  inclusion cyst      Subcutaneous movable cyst c/w pilar cyst      Firm pink to brown papule c/w dermatofibroma      Pedunculated fleshy papule(s) c/w skin tag(s)      Evenly pigmented macule c/w junctional nevus     Mildly variegated pigmented, slightly irregular-bordered macule c/w mildly atypical nevus      Flesh colored to evenly pigmented papule c/w intradermal nevus       Pink pearly papule/plaque c/w basal cell carcinoma      Erythematous hyperkeratotic cursted plaque c/w SCC      Surgical scar with no sign of skin cancer recurrence      Open and closed comedones      Inflammatory papules and pustules      Verrucoid papule consistent consistent with wart     Erythematous eczematous patches and plaques     Dystrophic onycholytic nail with subungual debris c/w onychomycosis     Umbilicated papule    Erythematous-base heme-crusted tan verrucoid plaque consistent with inflamed seborrheic keratosis     Erythematous Silvery Scaling Plaque c/w Psoriasis     See annotation      Assessment / Plan:        Rosacea  Status: chronic condition flaring and/or not at treatment goal  On the BL cheeks, nose and glabella there is background erythema/telangiectasias and also erythematous papules  I 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 They do not look classic at all for facial angiofibromas but that could be on differential  Recommend trial of Metrogel twice daily x 1 month to help with papular component  For redness component, vascular laser best option  Recommend sunscreen every day SPF 30+   If not improved not very bothersome at this time will not likely treat- topical mTOR inhibitors can treat facial angiofibromas    Skin tag  Seborrheic keratoses  Reassurance given to patient. No treatment is necessary.   Treatment of benign, asymptomatic lesions may be considered cosmetic.    Cherry angioma  This is a benign vascular lesion. Reassurance given. No treatment required.     Multiple  benign nevi  Reassurnace benign    Tuberous sclerosis  I have reviewed extensively hem/onc preventative clinic note they recommended screening exam for skin cancer  90% of TS cases associated with skin findings- on exam today none apparent aside from dental pits  No itzel-leaf spots, no periungual fibromas (Keonen's tumors), no angiofibromas (see rosacea above), no Shagreen patch    Screening exam for skin cancer  Total body skin examination performed today including at least 12 points as noted in physical examination. No lesions suspicious for malignancy noted.    Recommend daily sun protection/avoidance, use of at least SPF 30, broad spectrum sunscreen (OTC drug), skin self examinations, and routine physician surveillance to optimize early detection    RTC 1 year, sooner prn

## 2023-12-08 ENCOUNTER — LAB VISIT (OUTPATIENT)
Dept: LAB | Facility: HOSPITAL | Age: 43
End: 2023-12-08
Attending: STUDENT IN AN ORGANIZED HEALTH CARE EDUCATION/TRAINING PROGRAM
Payer: MEDICARE

## 2023-12-08 ENCOUNTER — OFFICE VISIT (OUTPATIENT)
Dept: PRIMARY CARE CLINIC | Facility: CLINIC | Age: 43
End: 2023-12-08
Payer: MEDICARE

## 2023-12-08 VITALS
OXYGEN SATURATION: 100 % | WEIGHT: 241.19 LBS | HEIGHT: 65 IN | DIASTOLIC BLOOD PRESSURE: 78 MMHG | BODY MASS INDEX: 40.18 KG/M2 | SYSTOLIC BLOOD PRESSURE: 108 MMHG | HEART RATE: 72 BPM

## 2023-12-08 DIAGNOSIS — R93.3 ABNORMAL FINDINGS ON DIAGNOSTIC IMAGING OF OTHER PARTS OF DIGESTIVE TRACT: ICD-10-CM

## 2023-12-08 DIAGNOSIS — R90.89 OTHER ABNORMAL FINDINGS ON DIAGNOSTIC IMAGING OF CENTRAL NERVOUS SYSTEM: ICD-10-CM

## 2023-12-08 DIAGNOSIS — N28.89 RENAL MASS: ICD-10-CM

## 2023-12-08 DIAGNOSIS — Q85.1 TUBEROUS SCLEROSIS: ICD-10-CM

## 2023-12-08 DIAGNOSIS — Z79.899 OTHER LONG TERM (CURRENT) DRUG THERAPY: ICD-10-CM

## 2023-12-08 DIAGNOSIS — D75.89 MACROCYTOSIS: ICD-10-CM

## 2023-12-08 DIAGNOSIS — G40.909 SEIZURE DISORDER: ICD-10-CM

## 2023-12-08 DIAGNOSIS — R79.9 ABNORMAL FINDING OF BLOOD CHEMISTRY, UNSPECIFIED: ICD-10-CM

## 2023-12-08 DIAGNOSIS — Z00.00 ANNUAL PHYSICAL EXAM: Primary | ICD-10-CM

## 2023-12-08 DIAGNOSIS — R91.8 MULTIPLE LUNG NODULES: ICD-10-CM

## 2023-12-08 LAB
25(OH)D3+25(OH)D2 SERPL-MCNC: 42 NG/ML (ref 30–96)
ALBUMIN SERPL BCP-MCNC: 3.5 G/DL (ref 3.5–5.2)
ALP SERPL-CCNC: 119 U/L (ref 55–135)
ALT SERPL W/O P-5'-P-CCNC: 20 U/L (ref 10–44)
ANION GAP SERPL CALC-SCNC: 10 MMOL/L (ref 8–16)
AST SERPL-CCNC: 17 U/L (ref 10–40)
BASOPHILS # BLD AUTO: 0.02 K/UL (ref 0–0.2)
BASOPHILS NFR BLD: 0.3 % (ref 0–1.9)
BILIRUB SERPL-MCNC: 0.3 MG/DL (ref 0.1–1)
BUN SERPL-MCNC: 10 MG/DL (ref 6–20)
CALCIUM SERPL-MCNC: 9.3 MG/DL (ref 8.7–10.5)
CHLORIDE SERPL-SCNC: 108 MMOL/L (ref 95–110)
CHOLEST SERPL-MCNC: 223 MG/DL (ref 120–199)
CHOLEST/HDLC SERPL: 3.6 {RATIO} (ref 2–5)
CO2 SERPL-SCNC: 17 MMOL/L (ref 23–29)
CREAT SERPL-MCNC: 0.7 MG/DL (ref 0.5–1.4)
DIFFERENTIAL METHOD: ABNORMAL
EOSINOPHIL # BLD AUTO: 0.1 K/UL (ref 0–0.5)
EOSINOPHIL NFR BLD: 1.6 % (ref 0–8)
ERYTHROCYTE [DISTWIDTH] IN BLOOD BY AUTOMATED COUNT: 12.1 % (ref 11.5–14.5)
EST. GFR  (NO RACE VARIABLE): >60 ML/MIN/1.73 M^2
ESTIMATED AVG GLUCOSE: 94 MG/DL (ref 68–131)
GLUCOSE SERPL-MCNC: 80 MG/DL (ref 70–110)
HBA1C MFR BLD: 4.9 % (ref 4–5.6)
HCT VFR BLD AUTO: 35.2 % (ref 37–48.5)
HDLC SERPL-MCNC: 62 MG/DL (ref 40–75)
HDLC SERPL: 27.8 % (ref 20–50)
HGB BLD-MCNC: 12 G/DL (ref 12–16)
IMM GRANULOCYTES # BLD AUTO: 0.04 K/UL (ref 0–0.04)
IMM GRANULOCYTES NFR BLD AUTO: 0.6 % (ref 0–0.5)
LDLC SERPL CALC-MCNC: 139 MG/DL (ref 63–159)
LYMPHOCYTES # BLD AUTO: 2 K/UL (ref 1–4.8)
LYMPHOCYTES NFR BLD: 32.3 % (ref 18–48)
MCH RBC QN AUTO: 35.4 PG (ref 27–31)
MCHC RBC AUTO-ENTMCNC: 34.1 G/DL (ref 32–36)
MCV RBC AUTO: 104 FL (ref 82–98)
MONOCYTES # BLD AUTO: 0.4 K/UL (ref 0.3–1)
MONOCYTES NFR BLD: 6.7 % (ref 4–15)
NEUTROPHILS # BLD AUTO: 3.7 K/UL (ref 1.8–7.7)
NEUTROPHILS NFR BLD: 58.5 % (ref 38–73)
NONHDLC SERPL-MCNC: 161 MG/DL
NRBC BLD-RTO: 0 /100 WBC
PLATELET # BLD AUTO: 294 K/UL (ref 150–450)
PMV BLD AUTO: 9.3 FL (ref 9.2–12.9)
POTASSIUM SERPL-SCNC: 4 MMOL/L (ref 3.5–5.1)
PROT SERPL-MCNC: 7.7 G/DL (ref 6–8.4)
RBC # BLD AUTO: 3.39 M/UL (ref 4–5.4)
SODIUM SERPL-SCNC: 135 MMOL/L (ref 136–145)
TRIGL SERPL-MCNC: 110 MG/DL (ref 30–150)
TSH SERPL DL<=0.005 MIU/L-ACNC: 1.57 UIU/ML (ref 0.4–4)
VIT B12 SERPL-MCNC: 944 PG/ML (ref 210–950)
WBC # BLD AUTO: 6.26 K/UL (ref 3.9–12.7)

## 2023-12-08 PROCEDURE — 99214 OFFICE O/P EST MOD 30 MIN: CPT | Mod: S$PBB,,, | Performed by: STUDENT IN AN ORGANIZED HEALTH CARE EDUCATION/TRAINING PROGRAM

## 2023-12-08 PROCEDURE — 82306 VITAMIN D 25 HYDROXY: CPT | Performed by: STUDENT IN AN ORGANIZED HEALTH CARE EDUCATION/TRAINING PROGRAM

## 2023-12-08 PROCEDURE — 99999 PR PBB SHADOW E&M-EST. PATIENT-LVL IV: CPT | Mod: PBBFAC,,, | Performed by: STUDENT IN AN ORGANIZED HEALTH CARE EDUCATION/TRAINING PROGRAM

## 2023-12-08 PROCEDURE — 36415 COLL VENOUS BLD VENIPUNCTURE: CPT | Mod: PN | Performed by: STUDENT IN AN ORGANIZED HEALTH CARE EDUCATION/TRAINING PROGRAM

## 2023-12-08 PROCEDURE — 84443 ASSAY THYROID STIM HORMONE: CPT | Performed by: STUDENT IN AN ORGANIZED HEALTH CARE EDUCATION/TRAINING PROGRAM

## 2023-12-08 PROCEDURE — 83036 HEMOGLOBIN GLYCOSYLATED A1C: CPT | Performed by: STUDENT IN AN ORGANIZED HEALTH CARE EDUCATION/TRAINING PROGRAM

## 2023-12-08 PROCEDURE — 80061 LIPID PANEL: CPT | Performed by: STUDENT IN AN ORGANIZED HEALTH CARE EDUCATION/TRAINING PROGRAM

## 2023-12-08 PROCEDURE — 99214 OFFICE O/P EST MOD 30 MIN: CPT | Mod: PBBFAC,PN | Performed by: STUDENT IN AN ORGANIZED HEALTH CARE EDUCATION/TRAINING PROGRAM

## 2023-12-08 PROCEDURE — 99999 PR PBB SHADOW E&M-EST. PATIENT-LVL IV: ICD-10-PCS | Mod: PBBFAC,,, | Performed by: STUDENT IN AN ORGANIZED HEALTH CARE EDUCATION/TRAINING PROGRAM

## 2023-12-08 PROCEDURE — 80053 COMPREHEN METABOLIC PANEL: CPT | Performed by: STUDENT IN AN ORGANIZED HEALTH CARE EDUCATION/TRAINING PROGRAM

## 2023-12-08 PROCEDURE — 99214 PR OFFICE/OUTPT VISIT, EST, LEVL IV, 30-39 MIN: ICD-10-PCS | Mod: S$PBB,,, | Performed by: STUDENT IN AN ORGANIZED HEALTH CARE EDUCATION/TRAINING PROGRAM

## 2023-12-08 PROCEDURE — 85025 COMPLETE CBC W/AUTO DIFF WBC: CPT | Performed by: STUDENT IN AN ORGANIZED HEALTH CARE EDUCATION/TRAINING PROGRAM

## 2023-12-08 PROCEDURE — 82607 VITAMIN B-12: CPT | Performed by: STUDENT IN AN ORGANIZED HEALTH CARE EDUCATION/TRAINING PROGRAM

## 2023-12-08 NOTE — PROGRESS NOTES
Malorie Taylor  1980        Subjective     Chief Complaint: Annual    History of Present Illness:  Ms. Malorie Taylor is a 43 y.o. female who presents to clinic for annual. Tuberous Sclerosis. Formal diagnosis. Was seeing Juanpablo Hernandez.    Urology- Seeing Dr. Thomas. 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 Had recommended Genetics consult in which Tuberous Sclerosis.     Dermatology-Dx with Rosacea. Dr. Fox. Saw last week.   90% of TS cases associated with skin findings- on exam today none apparent aside from dental pits  No itzel-leaf spots, no periungual fibromas (Keonen's tumors), no angiofibromas (see rosacea above), no Shagreen patch    Cardiology- doing well. No chest pain. Does not need to follow-up.   Cardiac involvement seems unlikely at this age without prior symptoms. Electrocardiogram shows no conduction system abnormalities.  Will get an echocardiogram to assess cardiac structure.         Left Ventricle: The left ventricle is normal in size. Normal wall thickness. There is normal systolic function with a visually estimated ejection fraction of 60 - 65%. Global longitudinal strain is -16.5%. There is normal diastolic function.    Right Ventricle: Normal right ventricular cavity size. Wall thickness is normal. Systolic function is normal.     Pulmonology-Follows with Dr. Reid. Had trouble with PFTs. CT chest-->concern for MMPH. Monitoring with CT chest in 1 year.    CT chest--11/2023. Due in 11/2024  The trachea and central airways are patent.  No lobar consolidation, large effusion or pneumothorax.  There is considerable respiratory motion artifact in the lungs.  There are innumerable subcentimeter pulmonary nodules.  The distribution appears random with slight upper lobe predominance.  Largest nodule on the right measures 0.6 cm (image 185 series 4) and largest nodule in the left upper lobe measures 0.7 cm (image 94 series 4).  Some of the nodules are subsolid.     There is a  small hiatal hernia.  Otherwise, the esophagus is normal caliber and course.     Limited review of the upper abdomen shows no acute abnormalities on noncontrast imaging.  There is a hypodensity near the hepatic dome which is not significantly changed from prior.  Patient's known left renal mass not well visualized on noncontrast imaging.     Osseous structures show no acute abnormalities.  Accentuated kyphosis at the thoracic lumbar junction.     Impression:     Innumerable solid and subsolid subcentimeter pulmonary nodules.  Given patient's history of tuberous sclerosis, multifocal micronodular pneumocyte hyperplasia (MMPH) is a consideration although not entirely specific.  The differential includes infectious, inflammatory and metastatic disease.  Surveillance is recommended with surveillance interval to be determined by pulmonary medicine.    Neuro- no recent seizures. Stable on current regiment. Follows with Dr. James in Epilepsy. No recent changes. On Carbamazepine 400 mg BID, Gabapentin 1200 mg TID, Topiramate 200 mg BID.    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  Levels today. DEXA scan 12/2021 with no evidence of early bone breakdown. Follow up in about 1 year.      Has eye doctor scheduled. Needs to see Dentist.    Review of Systems   Constitutional:  Negative for chills, fever and malaise/fatigue.   HENT:  Negative for congestion and sore throat.    Respiratory:  Negative for cough.    Cardiovascular:  Negative for leg swelling.   Gastrointestinal:  Negative for abdominal pain, nausea and vomiting.   Neurological:  Negative for sensory change, speech change, seizures and loss of consciousness.        PAST HISTORY:     Past Medical History:   Diagnosis Date    Allergy     Obesity 10/31/2015     Seizures     epilepsy       Past Surgical History:   Procedure Laterality Date    BIOPSY, WITH CT GUIDANCE Left 1/18/2023    Procedure: RENAL MASS BIOPSY, WITH CT GUIDANCE;  Surgeon: Luis Eduardo Nassar MD;  Location: [facility];  Service: Radiology;  Laterality: Left;       Family History   Problem Relation Age of Onset    Hyperlipidemia Mother     Hypertension Mother     Colonic polyp Mother         a couple in the '80s    Arrhythmia Father     Heart disease Father     Cholecystitis Sister     Cataracts Maternal Grandmother         in either this relative or this relative's mother    Macular degeneration Maternal Grandmother         WET - AMD    Hyperlipidemia Maternal Grandmother         later in life    Insomnia Maternal Grandmother     Hypertension Maternal Grandmother     Glaucoma Maternal Grandmother     Dementia Maternal Grandmother     Alzheimer's disease Maternal Grandmother     Heart disease Maternal Grandmother     Other Maternal Grandmother         benign breast tumor    Macular degeneration Maternal Grandfather         DRY - AMD    Vision loss Maternal Grandfather         corneal guttata (thin)    Hyperlipidemia Maternal Grandfather         later in life    Alzheimer's disease Maternal Grandfather     Hypertension Maternal Grandfather     Heart disease Paternal Grandmother     Emphysema Paternal Grandmother     Cervical cancer Paternal Grandmother     Diabetes Other         insulin-dependent    Breast cancer Other         age at dx unk    Lung cancer Other     Insomnia Maternal Aunt     Glaucoma Maternal Aunt     Coronary artery disease Maternal Aunt     Sleep apnea Maternal Uncle     Macular degeneration Maternal Uncle     Epilepsy Other     Tuberculosis Other     Heart attack Other     Heart attack Other     Heart disease Other     Glaucoma Other     Heart disease Other     Heart attack Other     Macular degeneration Other     Diverticulitis Other     Diabetes Other     Breast cancer Other 68     Heart disease Other     Kidney disease Other     Dementia Other     Heart disease Other     Heart disease Other     Acne Neg Hx     Eczema Neg Hx     Lupus Neg Hx     Psoriasis Neg Hx     Melanoma Neg Hx     Colon cancer Neg Hx     Ovarian cancer Neg Hx        Social History     Socioeconomic History    Marital status: Single   Occupational History    Occupation: Housekeeping   Tobacco Use    Smoking status: Never    Smokeless tobacco: Never   Substance and Sexual Activity    Alcohol use: Never    Drug use: Never    Sexual activity: Never     Social Determinants of Health     Financial Resource Strain: Low Risk  (8/23/2023)    Overall Financial Resource Strain (CARDIA)     Difficulty of Paying Living Expenses: Not hard at all   Food Insecurity: No Food Insecurity (8/23/2023)    Hunger Vital Sign     Worried About Running Out of Food in the Last Year: Never true     Ran Out of Food in the Last Year: Never true   Transportation Needs: No Transportation Needs (8/23/2023)    PRAPARE - Transportation     Lack of Transportation (Medical): No     Lack of Transportation (Non-Medical): No   Physical Activity: Insufficiently Active (8/23/2023)    Exercise Vital Sign     Days of Exercise per Week: 7 days     Minutes of Exercise per Session: 10 min   Stress: No Stress Concern Present (8/23/2023)    [place] [employer] - Occupational Stress Questionnaire     Feeling of Stress : Not at all   Social Connections: Moderately Isolated (8/23/2023)    Social Connection and Isolation Panel [NHANES]     Frequency of Communication with Friends and Family: More than three times a week     Frequency of Social Gatherings with Friends and Family: More than three times a week     Attends [employer] Services: More than 4 times per year     Active Member of Clubs or Organizations: No     Attends Club or Organization Meetings: Never     Marital Status: Never    Housing Stability: Low Risk  (8/23/2023)    Housing  Stability Vital Sign     Unable to Pay for Housing in the Last Year: No     Number of Places Lived in the Last Year: 1     Unstable Housing in the Last Year: No       MEDICATIONS & ALLERGIES:     Current Outpatient Medications on File Prior to Visit   Medication Sig    ALPRAZolam (XANAX) 0.25 MG tablet Take 4 tablets (1 mg total) by mouth On call Procedure for Anxiety (prior to GYN exam).    b complex vitamins capsule Take 1 capsule by mouth 3 (three) times a week.    carBAMazepine (TEGRETOL XR) 400 MG Tb12 Take 1 tablet (400 mg total) by mouth 2 (two) times daily.    cranberry 500 mg Cap Take by mouth.    cranberry fruit concentrate (AZO CRANBERRY ORAL) Take by mouth.    ferrous sulfate (FEOSOL) 325 mg (65 mg iron) Tab tablet Take 325 mg by mouth daily with breakfast.    fish oil-omega-3 fatty acids 300-1,000 mg capsule Take 1 g by mouth 2 (two) times daily.    folic acid (FOLVITE) 400 MCG tablet Take 400 mcg by mouth once daily.    gabapentin (NEURONTIN) 600 MG tablet Take 2 tablets (1,200 mg total) by mouth 3 (three) times daily.    hydrocortisone (ANUSOL-HC) 2.5 % rectal cream Place rectally 2 (two) times daily.    inulin (FIBER GUMMIES) 2 gram Chew Take by mouth.    metroNIDAZOLE (METROGEL) 0.75 % gel Apply to facial bumps twice daily    multivitamin capsule Take 1 capsule by mouth once daily.    topiramate (TOPAMAX) 200 MG Tab Take 1 tablet (200 mg total) by mouth 2 (two) times daily.    vitamin D 1000 units Tab Take 185 mg by mouth once daily.    VITAMIN E ACETATE ORAL Take by mouth.    docusate sodium (COLACE) 100 MG capsule Take 100 mg by mouth 2 (two) times daily.     No current facility-administered medications on file prior to visit.       Review of patient's allergies indicates:   Allergen Reactions    Phenobarbital      lethargy       OBJECTIVE:     Vital Signs:  Vitals:    12/08/23 0928   BP: 108/78   BP Location: Right arm   Patient Position: Sitting   BP Method: Large (Manual)   Pulse: 72   SpO2:  "100%   Weight: 109.4 kg (241 lb 2.9 oz)   Height: 5' 5" (1.651 m)       Body mass index is 40.13 kg/m².     Physical Exam:  Physical Exam  Vitals and nursing note reviewed. Exam conducted with a chaperone present.   Constitutional:       General: She is not in acute distress.     Appearance: Normal appearance. She is not ill-appearing, toxic-appearing or diaphoretic.      Comments: Here with mom. At her baseline.    HENT:      Head: Normocephalic and atraumatic.   Eyes:      General: No scleral icterus.        Right eye: No discharge.         Left eye: No discharge.      Conjunctiva/sclera: Conjunctivae normal.   Cardiovascular:      Rate and Rhythm: Normal rate and regular rhythm.      Pulses: Normal pulses.   Pulmonary:      Effort: Pulmonary effort is normal. No respiratory distress.      Breath sounds: Normal breath sounds. No wheezing.   Musculoskeletal:      Cervical back: Normal range of motion.      Right lower leg: No edema.      Left lower leg: No edema.   Skin:     General: Skin is warm and dry.   Neurological:      Mental Status: She is alert. Mental status is at baseline.   Psychiatric:         Mood and Affect: Mood normal.            Laboratory  Lab Results   Component Value Date    WBC 5.71 08/25/2023    HGB 12.0 08/25/2023    HCT 35.7 (L) 08/25/2023     (H) 08/25/2023     08/25/2023     Lab Results   Component Value Date    GLU 81 08/25/2023     (L) 08/25/2023    K 3.9 08/25/2023     08/25/2023    CO2 23 08/25/2023    BUN 12 08/25/2023    CREATININE 0.7 08/25/2023    CALCIUM 8.9 08/25/2023     Lab Results   Component Value Date    INR 1.0 01/10/2023     Lab Results   Component Value Date    HGBA1C 4.8 03/30/2023           Health Maintenance         Date Due Completion Date    Pneumococcal Vaccines (Age 0-64) (1 - PCV) Never done ---    COVID-19 Vaccine (8 - 2023-24 season) 09/01/2023 11/4/2021    Mammogram 07/06/2024 7/6/2023    Hemoglobin A1c (Diabetic Prevention Screening) " 03/30/2026 3/30/2023    Cervical Cancer Screening 01/09/2028 1/9/2023    TETANUS VACCINE 02/01/2028 2/1/2018                ASSESSMENT & PLAN:   Ms. Malorie Taylor is a 43 y.o. female who was seen today in clinic for annual, follow-up.       1. Annual physical exam  -     CBC Auto Differential; Future; Expected date: 12/08/2023  -     Comprehensive Metabolic Panel; Future; Expected date: 12/08/2023  -     Hemoglobin A1C; Future; Expected date: 12/08/2023  -     Lipid Panel; Future; Expected date: 12/08/2023  -     TSH; Future; Expected date: 12/08/2023  -     Vitamin D; Future; Expected date: 12/08/2023  -     VITAMIN B12; Future; Expected date: 12/08/2023    2. Tuberous sclerosis  -     CBC Auto Differential; Future; Expected date: 12/08/2023  -     Comprehensive Metabolic Panel; Future; Expected date: 12/08/2023  -     Hemoglobin A1C; Future; Expected date: 12/08/2023  -     Lipid Panel; Future; Expected date: 12/08/2023  -     TSH; Future; Expected date: 12/08/2023  -     Vitamin D; Future; Expected date: 12/08/2023  -     VITAMIN B12; Future; Expected date: 12/08/2023    3. Renal mass  -     CBC Auto Differential; Future; Expected date: 12/08/2023  -     Comprehensive Metabolic Panel; Future; Expected date: 12/08/2023  -     Hemoglobin A1C; Future; Expected date: 12/08/2023  -     Lipid Panel; Future; Expected date: 12/08/2023  -     TSH; Future; Expected date: 12/08/2023  -     Vitamin D; Future; Expected date: 12/08/2023  -     VITAMIN B12; Future; Expected date: 12/08/2023    4. Seizure disorder  -     CBC Auto Differential; Future; Expected date: 12/08/2023  -     Comprehensive Metabolic Panel; Future; Expected date: 12/08/2023  -     Hemoglobin A1C; Future; Expected date: 12/08/2023  -     Lipid Panel; Future; Expected date: 12/08/2023  -     TSH; Future; Expected date: 12/08/2023  -     Vitamin D; Future; Expected date: 12/08/2023  -     VITAMIN B12; Future; Expected date: 12/08/2023    5. Multiple  lung nodules  -     CBC Auto Differential; Future; Expected date: 12/08/2023  -     Comprehensive Metabolic Panel; Future; Expected date: 12/08/2023  -     Hemoglobin A1C; Future; Expected date: 12/08/2023  -     Lipid Panel; Future; Expected date: 12/08/2023  -     TSH; Future; Expected date: 12/08/2023  -     Vitamin D; Future; Expected date: 12/08/2023  -     VITAMIN B12; Future; Expected date: 12/08/2023    6. Macrocytosis  -     CBC Auto Differential; Future; Expected date: 12/08/2023  -     Comprehensive Metabolic Panel; Future; Expected date: 12/08/2023  -     Hemoglobin A1C; Future; Expected date: 12/08/2023  -     Lipid Panel; Future; Expected date: 12/08/2023  -     TSH; Future; Expected date: 12/08/2023  -     Vitamin D; Future; Expected date: 12/08/2023  -     VITAMIN B12; Future; Expected date: 12/08/2023    7. Abnormal findings on diagnostic imaging of other parts of digestive tract  -     CBC Auto Differential; Future; Expected date: 12/08/2023  -     Comprehensive Metabolic Panel; Future; Expected date: 12/08/2023  -     Hemoglobin A1C; Future; Expected date: 12/08/2023  -     Lipid Panel; Future; Expected date: 12/08/2023  -     TSH; Future; Expected date: 12/08/2023  -     Vitamin D; Future; Expected date: 12/08/2023  -     VITAMIN B12; Future; Expected date: 12/08/2023    8. Abnormal finding of blood chemistry, unspecified  -     CBC Auto Differential; Future; Expected date: 12/08/2023  -     Comprehensive Metabolic Panel; Future; Expected date: 12/08/2023  -     Hemoglobin A1C; Future; Expected date: 12/08/2023  -     Lipid Panel; Future; Expected date: 12/08/2023  -     TSH; Future; Expected date: 12/08/2023  -     Vitamin D; Future; Expected date: 12/08/2023  -     VITAMIN B12; Future; Expected date: 12/08/2023    9. Other abnormal findings on diagnostic imaging of central nervous system  -     CBC Auto Differential; Future; Expected date: 12/08/2023  -     Comprehensive Metabolic Panel;  Future; Expected date: 12/08/2023  -     Hemoglobin A1C; Future; Expected date: 12/08/2023  -     Lipid Panel; Future; Expected date: 12/08/2023  -     TSH; Future; Expected date: 12/08/2023  -     Vitamin D; Future; Expected date: 12/08/2023  -     VITAMIN B12; Future; Expected date: 12/08/2023    10. Other long term (current) drug therapy  -     CBC Auto Differential; Future; Expected date: 12/08/2023  -     Comprehensive Metabolic Panel; Future; Expected date: 12/08/2023  -     Hemoglobin A1C; Future; Expected date: 12/08/2023  -     Lipid Panel; Future; Expected date: 12/08/2023  -     TSH; Future; Expected date: 12/08/2023  -     Vitamin D; Future; Expected date: 12/08/2023  -     VITAMIN B12; Future; Expected date: 12/08/2023       Continued follow-up with specialists.     Martita Rico MD  Internal Medicine         Portions of this note may have been generated using voice recognition software.  Please excuse any spelling/grammatical errors. Occasional wrong-word or sound-a-like substitutions may have also occurred due to the inherent limitations of voice recognition software. Please read the chart carefully and recognize, using context, where substitutions have occurred.

## 2023-12-13 ENCOUNTER — OFFICE VISIT (OUTPATIENT)
Dept: OPTOMETRY | Facility: CLINIC | Age: 43
End: 2023-12-13
Payer: MEDICARE

## 2023-12-13 DIAGNOSIS — H52.4 PRESBYOPIA: ICD-10-CM

## 2023-12-13 DIAGNOSIS — Q85.1 TUBEROUS SCLEROSIS: Primary | ICD-10-CM

## 2023-12-13 DIAGNOSIS — Z15.89: ICD-10-CM

## 2023-12-13 PROCEDURE — 99999 PR PBB SHADOW E&M-EST. PATIENT-LVL III: CPT | Mod: PBBFAC,,, | Performed by: OPTOMETRIST

## 2023-12-13 PROCEDURE — 92004 COMPRE OPH EXAM NEW PT 1/>: CPT | Mod: S$PBB,,, | Performed by: OPTOMETRIST

## 2023-12-13 PROCEDURE — 99999 PR PBB SHADOW E&M-EST. PATIENT-LVL III: ICD-10-PCS | Mod: PBBFAC,,, | Performed by: OPTOMETRIST

## 2023-12-13 PROCEDURE — 92015 PR REFRACTION: ICD-10-PCS | Mod: ,,, | Performed by: OPTOMETRIST

## 2023-12-13 PROCEDURE — 92015 DETERMINE REFRACTIVE STATE: CPT | Mod: ,,, | Performed by: OPTOMETRIST

## 2023-12-13 PROCEDURE — 99213 OFFICE O/P EST LOW 20 MIN: CPT | Mod: PBBFAC,PO | Performed by: OPTOMETRIST

## 2023-12-13 PROCEDURE — 92004 PR EYE EXAM, NEW PATIENT,COMPREHESV: ICD-10-PCS | Mod: S$PBB,,, | Performed by: OPTOMETRIST

## 2023-12-13 NOTE — PROGRESS NOTES
VIRIDIANA    TOBIAS: 7/09/2020 - Dr. Mata    CC: Pt is here today for a routine eye exam. Pt states that she has   difficult with reading small print.    (+) Redness - only when rubbing eyes  (-)Dryness  (-)Burning  (-)Itchiness  (-)Tearing  (-)Flashes  (-)Floaters   (+)Photophobia  (-)Eye Pain      Diabetic: no    Contact Lens: no    Eye Meds: no  Last edited by Andie Heart MA on 12/13/2023 12:55 PM.            Assessment /Plan     For exam results, see Encounter Report.    Tuberous sclerosis  Monoallelic mutation of TSC1 gene  -     Ambulatory referral/consult to Ophthalmology  Small optic nerve NFL tuft on disc, stable compared to last visit in 2020   Photos next visit    Encounter for examination of eyes and vision without abnormal findings  Disease ruled out after examination  Routine eye exam  -     Ambulatory referral/consult to Optometry     Presbyopia              Rx specs or Ok to use OTC readers     RTC 1 year, DFE, fundus/ disc photos    sooner PRN

## 2023-12-18 NOTE — TELEPHONE ENCOUNTER
Carbamazepine 400 mg twice daily    Nancy James MD PhD  Neurology-Epilepsy  [facility].    
Name band;

## 2024-02-15 ENCOUNTER — HOSPITAL ENCOUNTER (OUTPATIENT)
Dept: RADIOLOGY | Facility: HOSPITAL | Age: 44
Discharge: HOME | End: 2024-02-15
Attending: UROLOGY
Payer: MEDICARE

## 2024-02-15 DIAGNOSIS — N28.89 LEFT RENAL MASS: ICD-10-CM

## 2024-02-15 DIAGNOSIS — D30.00 RENAL ONCOCYTOMA, UNSPECIFIED LATERALITY: ICD-10-CM

## 2024-02-15 PROCEDURE — 76775 US EXAM ABDO BACK WALL LIM: CPT | Mod: TC

## 2024-02-15 PROCEDURE — 76775 US EXAM ABDO BACK WALL LIM: CPT | Mod: 26,,, | Performed by: RADIOLOGY

## 2024-02-16 ENCOUNTER — TELEPHONE (OUTPATIENT)
Dept: UROLOGY | Facility: CLINIC | Age: 44
End: 2024-02-16
Payer: MEDICARE

## 2024-03-07 NOTE — PROGRESS NOTES
"Subjective:      Malorie Taylor is a 43 y.o. female who returns today regarding her       Pt saw Juanpablo Hernandez DNP and was diagnosed with tuberous sclerosis complex  See below recs from NCCN guidelines"    Kidney      Managing Provider: Maxx Thomas MD, [facility]      An estimated 80% of children with TSC have an identifiable renal lesion by a mean age of 10.5 years     Common histologies of renal lesions in TSC (observed in this approximate percentage of individuals with TSC):  Angiomyolipoma and other perivascular epithelioid cell neoplasms (PEComas)  Benign angiomyolipoma (70%)  Malignant angiomyolipoma (<1%)  Renal cysts (20%-30%)  Renal cell carcinoma (RCC) (2%-5%) - Age at diagnosis is often 28-30 years  Eosinophilic solid and cystic renal cell carcinoma (RCC)  RCC with fibromyomatous stroma  Clear cell RCC  Eosinophilic vacuolated tumor  Low-grade oncocytic tumor (<1%)   Upon Initial Diagnosis/Suspicion of TSC Ongoing Management Patient- Specific Plan   Abdomen MRI  Obtain abdomen MRI to assess for the presence of angiomyolipoma and renal cysts - Note:  Common imaging techniques may not distinguish fat-poor angiomyolipoma from RCC, so immunologic staining for HMB-45 for angiomyolipomas and cytokeratin for RCC is recommended  NCCN (2023a) advises to begin at age 12 years or at age 10 years before the earliest diagnosis in a relative - whichever is earlier; Chi et al. (2021) do not specify age at which to begin  NCCN (2023a) indicates that, while MRI is preferred, CT can be done in place of MRI but should be limited if possible for surveillance due to lifetime radiation exposure; CT can be used for surgical planning     Renal Function  Screen for hypertension by obtaining an accurate blood pressure  Evaluate renal function by determination of glomerular filtration rate (GFR) Abdomen MRI  Obtain MRI of the abdomen to assess for the progression of angiomyolipoma and renal cystic disease every 1-3 " years (Chi et al., 2021; NCCN [2023a] advises every 3-5 years) throughout lifetime; imaging frequency would increase as indicated once lesions are detected - Note:  Common imaging techniques may not distinguish fat-poor angiomyolipoma from RCC, so immunologic staining for HMB-45 for angiomyolipomas and cytokeratin for RCC is recommended  NCCN (2023a) advises to begin at age 12 years or at age 10 years before the earliest diagnosis in a relative - whichever is earlier; Chi et al. (2021) do not specify age at which to begin  NCCN (2023a) indicates that, while MRI is preferred, CT can be done in place of MRI but should be limited if possible for surveillance due to lifetime radiation exposure; CT can be used for surgical planning     Renal Function  Assess renal function including determination of GFR and assessment for proteinuria and of blood pressure at least annually     Management of Angiomyolipoma  Embolization followed by corticosteroids is the first-line therapy for angiomyolipoma presenting with acute hemorrhage  Nephrectomy is to be avoided; for asymptomatic, growing angiomyolipoma measuring larger than 3 cm in diameter, treatment with a mammalian target of rapamycin inhibitor (mTORi) is the recommended first-line therapy; selective embolization or kidney-sparing resection are acceptable second-line treatments for asymptomatic angiomyolipoma     Management of Malignant Renal Tumors  Nephron-sparing surgery is treatment of choice whenever possible, with consideration that the patient may have multiple tumors during their lifetime  Ablative treatment options may be considered for those with significant medical or surgical risks associated with undergoing an operation     Prior to Pregnancy (Females)  Patients of childbearing age who are planning conception should consider undergoing renal imaging prior to pregnancy Malorie is to continue care with Dr. Maxx Thomas for management       currently  asymptomatic    The following portions of the patient's history were reviewed and updated as appropriate: allergies, current medications, past family history, past medical history, past social history, past surgical history and problem list.    Review of Systems  Pertinent items are noted in HPI.  A comprehensive multipoint review of systems was negative except as otherwise stated in the HPI.    Past Medical History:   Diagnosis Date    Allergy     Obesity 10/31/2015    Seizures     epilepsy     Past Surgical History:   Procedure Laterality Date    BIOPSY, WITH CT GUIDANCE Left 1/18/2023    Procedure: RENAL MASS BIOPSY, WITH CT GUIDANCE;  Surgeon: Luis Eduardo Nassar MD;  Location: [facility] CATH LAB;  Service: Radiology;  Laterality: Left;       Review of patient's allergies indicates:   Allergen Reactions    Phenobarbital      lethargy          Objective:   Vitals: There were no vitals taken for this visit.    Physical Exam   Video visit  Unremarkable; unchanged exam    Physical Exam    Lab Review   Urinalysis demonstrates no specimen    Lab Results   Component Value Date    WBC 6.26 12/08/2023    HGB 12.0 12/08/2023    HCT 35.2 (L) 12/08/2023     (H) 12/08/2023     12/08/2023     Lab Results   Component Value Date    CREATININE 0.7 12/08/2023    BUN 10 12/08/2023     Final Pathologic Diagnosis LEFT RENAL MASS, CT-GUIDED BIOPSY WITH PATHOLOGIST ASSISTED ADEQUACY   (CYTOLOGY AND CELL BLOCK):   Oncocytic neoplasm, suggestive of low-grade oncocytic tumor.   See comment.    Comment: Interp By Ling Gill M.D., Signed on 01/23/2023 at 14:19       Imaging  US KIDNEY     CLINICAL HISTORY:  Benign neoplasm of unspecified kidney     TECHNIQUE:  Ultrasound of the kidneys was performed including color flow and Doppler evaluation of the kidneys.     COMPARISON:  CT abdomen pelvis 08/25/2023, renal ultrasound 08/08/2023, ultrasound abdomen 10/21/2022     FINDINGS:  Right kidney: The right kidney measures 10.6 cm.  No cortical thinning. No loss of corticomedullary distinction. Resistive index measures 0.66.  No mass. No renal stone. No hydronephrosis.     Left kidney: The left kidney measures 11.7 cm. No cortical thinning. No loss of corticomedullary distinction. Resistive index measures 0.64.  Interval increase in size of the ovoid lesion in the mid left kidney, which now measures 7.3 x 4.1 x 5.2 cm (previously 4.9 x 3.2 x 4.9 cm) it appears hypoechoic relative to the renal cortex.  Stable isoechoic lesion in the lower pole measuring 1.7 x 1.6 x 1.5 cm (previously 2.0 x 2.0 x 1.6 cm).  No renal stone. No hydronephrosis.     Additional findings: Echogenic focus in the right hepatic lobe measuring 1.6 x 2.1 x 1.8 cm without internal Doppler signal.  This likely reflects a hemangioma and appears stable compared to CT abdomen pelvis dated 05/20/2023.     Impression:     Interval increase in size of the mass in the medial interpolar region of the left kidney which now appears hypoechoic relative to renal cortex.  Further evaluation with MRI renal mass protocol is recommended.     Stable isoechoic lesion in the inferior pole of left kidney.     Likely hemangioma in the right hepatic lobe, stable.     Electronically signed by resident: Saqib Biggs  Date:                                            02/15/2024  Time:                                           13:25     Electronically signed by: Boyd Mcduffie MD  Date:                                            02/15/2024  Time:                                           13:52        Assessment and Plan:   Renal oncocytoma, left enlarging  MRI renal protocol  Then present case at  onc conf and see me   Will discuss everolimus     Bilateral small renal masses  We discussed the natural history of small renal masses, the risk of malignancy and disease progression, and the small risk of mortality.  We discussed the risks and benefits of watchful waiting, biopsy, partial and total  nephrectomy.  We discussed the benefits of renal preservation when possible.  We discussed percutaneous, laparoscopic and robotic approaches.  We discussed the management of positive surgical margins.  I answered all questions.  Prefer surveillance     I suspect these may be fetal lobulations rather than true renal masses.  I do not see anything that needs removal at this time     Tuberous sclerosis  Per heme onc and genetics        Pulmonary nodules  See PCP and consider pulm clinic referral

## 2024-03-08 ENCOUNTER — OFFICE VISIT (OUTPATIENT)
Dept: UROLOGY | Facility: CLINIC | Age: 44
End: 2024-03-08
Payer: MEDICARE

## 2024-03-08 ENCOUNTER — TELEPHONE (OUTPATIENT)
Dept: UROLOGY | Facility: CLINIC | Age: 44
End: 2024-03-08

## 2024-03-08 DIAGNOSIS — D30.00 RENAL ONCOCYTOMA, UNSPECIFIED LATERALITY: Primary | ICD-10-CM

## 2024-03-08 PROCEDURE — 99214 OFFICE O/P EST MOD 30 MIN: CPT | Mod: 95,,, | Performed by: UROLOGY

## 2024-03-08 NOTE — TELEPHONE ENCOUNTER
----- Message from Maxx Thomas MD sent at 3/8/2024  8:23 AM CST -----  Bev, please schedule a BMP and MRI abdomen    Vangie, can we get this pt to see someone with oncology please?  She has tuberous sclerosis and a rapidly growing oncocytoma.  We need to consider everolimus.  I am adding her on the tumor board agenda. I'm copying a couple friendly oncologists that I know of this message. thanks

## 2024-03-08 NOTE — Clinical Note
Bev, please schedule a BMP and MRI abdomen  Vangie, can we get this pt to see someone with oncology please?  She has tuberous sclerosis and a rapidly growing oncocytoma.  We need to consider everolimus.  I am adding her on the tumor board agenda. I'm copying a couple friendly oncologists that I know of this message. thanks

## 2024-03-12 ENCOUNTER — TUMOR BOARD CONFERENCE (OUTPATIENT)
Dept: UROLOGY | Facility: HOSPITAL | Age: 44
End: 2024-03-12
Payer: MEDICARE

## 2024-03-13 NOTE — PROGRESS NOTES
PATIENT SUMMARY: 43 yo female with a history of Incidentally discovered left renal mass. Seizures and mental retardation. Posterior hilar mass. Biopsy in 01/2023 called oncocytoma vs low grade oncocytic neoplasm. Put on surveillance. Negligible increase in size of the mass. Radiologist now calling bilateral multifocal renal masses. Patient is planned to see genetics to rule out TSC and Ramila Akanksha Quentin.     Presented at [facility] on 9/5/23 - plan for genetic screening and active surveillance.      Pt saw Juanpablo Hernandez DNP and was diagnosed with tuberous sclerosis complex     MRI Abdomen ordered but not completed.      IMDC RISK (if applicable):    ESTIMATED GFR/CKD STAGE: CKD1 GFR >90 ml/min    STAGING SCANS COMPLETE: CT Chest Without Contrast       FACULTY IN ATTENDANCE:    Urologic Oncology: Boyd Lagos MD [x], Maxx Thomas MD [x] , Stanley Valenzuela MD [x], Alejandro Campbell MD [x], Franko Louise MD []     Medical Oncology: Ladarius Leung MD [x], Alicia Shanks MD [] , Cesar Ho MD [x], Gume Mcduffie MD [] Nii Holguin MD [], Stacey Cross MD [] , Jennifer Fournier MD [], Jamila Wolfe MD [] Pretty Rose MD [x]     Radiation Oncology: Ramy Coleman MD [x], Skinny Brown MD [x]     CONSULT NEEDED:     [] Urologic Oncology    [] Med Onc    [] Rad/Onc  [] CRS [] Surg Onc    [x]Treatment Guidelines (NCCN and AUA) reviewed and care planned is consistent with guidelines.    TUMOR BOARD RECOMMENDATIONS/PLAN/CONSENSUS:     Case discussed among group. Pathology and radiologic images were reviewed (if applicable).      Discussion:   Low grade oncocytic neoplasm that is growing. 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 MR pending. Partial would be difficult given hilar location. Path concerning for a low grade oncocytic tumor, more concerning than standard oncocytoma. Stained positive for CK7, negative for . Surgery likely a better option than long course of Everolimus.      Plan:  Follow up MRI. Likely surgery following MRI.

## 2024-03-18 ENCOUNTER — TELEPHONE (OUTPATIENT)
Dept: UROLOGY | Facility: CLINIC | Age: 44
End: 2024-03-18
Payer: MEDICARE

## 2024-03-18 NOTE — TELEPHONE ENCOUNTER
----- Message from Vangie Hamm RN sent at 3/15/2024  5:11 PM CDT -----  Regarding: MRI with Thomas to follow up  Nasir Pablo,  I was checking on this patient post tumor board discussion, her MRI is on 4/2 when William is out on vacation. Maybe have that rescheduled for a day she can see do both on the same day since she lives out of town.    Vangie    ----- Message -----  From: Maxx Thomas MD  Sent: 3/8/2024   8:23 AM CDT  To: Alicia Shanks MD; Bev Wright RN; #    Bev, please schedule a BMP and MRI abdomen    Vangie, can we get this pt to see someone with oncology please?  She has tuberous sclerosis and a rapidly growing oncocytoma.  We need to consider everolimus.  I am adding her on the tumor board agenda. I'm copying a couple friendly oncologists that I know of this message. thanks

## 2024-03-21 ENCOUNTER — TELEPHONE (OUTPATIENT)
Dept: HEMATOLOGY/ONCOLOGY | Facility: CLINIC | Age: 44
End: 2024-03-21
Payer: MEDICARE

## 2024-03-21 NOTE — TELEPHONE ENCOUNTER
----- Message from Bev Wright RN sent at 3/18/2024  3:11 PM CDT -----  Regarding: RE: MRI with William to follow up  Done!  ----- Message -----  From: Vangie Hamm RN  Sent: 3/15/2024   5:13 PM CDT  To: Bev Wright RN  Subject: MRI with William to follow up                    Nasir Pablo,  I was checking on this patient post tumor board discussion, her MRI is on 4/2 when William is out on vacation. Maybe have that rescheduled for a day she can see do both on the same day since she lives out of town.    Vangie    ----- Message -----  From: Maxx Thomas MD  Sent: 3/8/2024   8:23 AM CDT  To: Alicia Shanks MD; Bev Wright RN; #    Bev, please schedule a BMP and MRI abdomen    Vangie, can we get this pt to see someone with oncology please?  She has tuberous sclerosis and a rapidly growing oncocytoma.  We need to consider everolimus.  I am adding her on the tumor board agenda. I'm copying a couple friendly oncologists that I know of this message. thanks

## 2024-03-21 NOTE — NURSING
Oncology nurse navigator contacted patient's father for scheduling medical oncology referral placed by Dr. Thomas. Date, time, and location discussed. All questions/concerns addressed. Mr. Taylor verbalized understanding. Contact information provided for further assistance. Agreed to call with any additional needs.

## 2024-03-28 ENCOUNTER — TELEPHONE (OUTPATIENT)
Dept: UROLOGY | Facility: CLINIC | Age: 44
End: 2024-03-28
Payer: MEDICARE

## 2024-03-28 NOTE — TELEPHONE ENCOUNTER
Reviewed upcoming appts.  ----- Message from Ama Miranda sent at 3/27/2024  3:05 PM CDT -----  Regarding: Advice  Contact: [phone]  Patient is calling to speak with someone in office about lab work. Please contact pt

## 2024-03-31 NOTE — PROGRESS NOTES
Subjective:      Malorie Taylor is a 44 y.o. female who returns today regarding her     Presents with her parents to review imaging and disucss options for enlarging left renal oncocytic neoplasm.    Not symptomatic    No previous ab surgeries    +Tuberous sclerosis    The following portions of the patient's history were reviewed and updated as appropriate: allergies, current medications, past family history, past medical history, past social history, past surgical history and problem list.    Review of Systems  Pertinent items are noted in HPI.  A comprehensive multipoint review of systems was negative except as otherwise stated in the HPI.    Past Medical History:   Diagnosis Date    Allergy     Obesity 10/31/2015    Seizures     epilepsy     Past Surgical History:   Procedure Laterality Date    BIOPSY, WITH CT GUIDANCE Left 1/18/2023    Procedure: RENAL MASS BIOPSY, WITH CT GUIDANCE;  Surgeon: Luis Eduardo Nassar MD;  Location: [facility] CATH LAB;  Service: Radiology;  Laterality: Left;       Review of patient's allergies indicates:   Allergen Reactions    Phenobarbital      lethargy          Objective:   Vitals: There were no vitals taken for this visit.    Physical Exam   General: alert and oriented, no acute distress  Respiratory: Symmetric expansion, non-labored breathing  Cardiovascular: no peripheral edema  Abdomen: soft, non distended  Skin: normal coloration and turgor, no rashes, no suspicious skin lesions noted  Neuro: no gross deficits; well controlled seizures on meds  Psych: normal judgment and insight, normal mood/affect, and non-anxious    Physical Exam    Lab Review   Urinalysis demonstrates pending    Lab Results   Component Value Date    WBC 6.26 12/08/2023    HGB 12.0 12/08/2023    HCT 35.2 (L) 12/08/2023     (H) 12/08/2023     12/08/2023     Lab Results   Component Value Date    CREATININE 0.7 12/08/2023    BUN 10 12/08/2023     Final Pathologic Diagnosis LEFT RENAL MASS,  CT-GUIDED BIOPSY WITH PATHOLOGIST ASSISTED ADEQUACY   (CYTOLOGY AND CELL BLOCK):   Oncocytic neoplasm, suggestive of low-grade oncocytic tumor.   See comment.    Comment: Interp By Ling Gill M.D., Signed on 01/23/2023 at 14:19        Imaging  MRI ABDOMEN W WO CONTRAST     CLINICAL HISTORY:  Oncocytoma; TSC; consider everolimus;  Benign neoplasm of unspecified kidney     FINDINGS:  Comparison is CT dated 08/25/2023, and MRI dated 11/16/2022.  Most recent comparison is ultrasound of the kidney 02/15/2024.  Images are compromised by motion artifact.     The medial left renal mass measures 5.7 x 4.5 x 3.9 cm.  Previous CT 4.5 x 3.8 x 4 cm.     Lateral left renal lesion 2.4 cm, previous CT 2.2 cm.     Upper pole left renal lesion 1.3 cm.  Lower pole left renal lesion 1.5 cm.     Lower pole right renal lesion 1.3 cm, previously 1.6.     There is a liver lesion most likely a hemangioma.  The spleen, stomach, pancreas, and duodenum show nothing unusual.  The gallbladder, and biliary tree are unremarkable.  The adrenal glands are not enlarged.  No adenopathy seen.     Impression:     Dominant lesion medial left kidney as above.  This is slightly larger.     Left lateral renal lesion and left upper pole renal lesions as above.  There is a right lower pole renal lesion.  All these are worrisome for renal cell carcinoma.     Hemangioma in the liver.        Electronically signed by: Sridhar Whitaker MD  Date:                                            04/03/2024  Time:                                           11:36        Assessment and Plan:   Left renal mass; enlarging oncocytic neoplasm; 5.4cm    Case was discussed at  tumor board and pathology reviewed.   Given the growth rate and size there is concern for a more aggressive tumor.  Therefore we will    Schedule left partial nephrectomy 4/11    We will plan a retroperitoneal robotic approach.  However given the size and hilar location of the tumor, conversion to an open  approach and/or a total nephrectomy may be necessary    We will plan to enucleate the mass.  I explained that margins may occur, but the first concern is preserving as much renal parenchyma as possible.      Bilateral small renal masses  We discussed the natural history of small renal masses, the risk of malignancy and disease progression, and the small risk of mortality.  We discussed the risks and benefits of watchful waiting, biopsy, partial and total nephrectomy.  We discussed the benefits of renal preservation when possible.  We discussed percutaneous, laparoscopic and robotic approaches.  We discussed the management of positive surgical margins.  I answered all questions.     Plan radiographic surveillance  Will also have her see Dr Ho to discuss the option of Everolimus for the smaller renal masses.          Tuberous sclerosis  Per heme onc and genetics         Pulmonary nodules  See PCP and consider pulm clinic referral

## 2024-03-31 NOTE — H&P (VIEW-ONLY)
Subjective:      Malorie Taylor is a 44 y.o. female who returns today regarding her     Presents with her parents to review imaging and disucss options for enlarging left renal oncocytic neoplasm.    Not symptomatic    No previous ab surgeries    +Tuberous sclerosis    The following portions of the patient's history were reviewed and updated as appropriate: allergies, current medications, past family history, past medical history, past social history, past surgical history and problem list.    Review of Systems  Pertinent items are noted in HPI.  A comprehensive multipoint review of systems was negative except as otherwise stated in the HPI.    Past Medical History:   Diagnosis Date    Allergy     Obesity 10/31/2015    Seizures     epilepsy     Past Surgical History:   Procedure Laterality Date    BIOPSY, WITH CT GUIDANCE Left 1/18/2023    Procedure: RENAL MASS BIOPSY, WITH CT GUIDANCE;  Surgeon: Luis Eduardo Nassar MD;  Location: [facility] CATH LAB;  Service: Radiology;  Laterality: Left;       Review of patient's allergies indicates:   Allergen Reactions    Phenobarbital      lethargy          Objective:   Vitals: There were no vitals taken for this visit.    Physical Exam   General: alert and oriented, no acute distress  Respiratory: Symmetric expansion, non-labored breathing  Cardiovascular: no peripheral edema  Abdomen: soft, non distended  Skin: normal coloration and turgor, no rashes, no suspicious skin lesions noted  Neuro: no gross deficits; well controlled seizures on meds  Psych: normal judgment and insight, normal mood/affect, and non-anxious    Physical Exam    Lab Review   Urinalysis demonstrates pending    Lab Results   Component Value Date    WBC 6.26 12/08/2023    HGB 12.0 12/08/2023    HCT 35.2 (L) 12/08/2023     (H) 12/08/2023     12/08/2023     Lab Results   Component Value Date    CREATININE 0.7 12/08/2023    BUN 10 12/08/2023     Final Pathologic Diagnosis LEFT RENAL MASS,  CT-GUIDED BIOPSY WITH PATHOLOGIST ASSISTED ADEQUACY   (CYTOLOGY AND CELL BLOCK):   Oncocytic neoplasm, suggestive of low-grade oncocytic tumor.   See comment.    Comment: Interp By Ling Gill M.D., Signed on 01/23/2023 at 14:19        Imaging  MRI ABDOMEN W WO CONTRAST     CLINICAL HISTORY:  Oncocytoma; TSC; consider everolimus;  Benign neoplasm of unspecified kidney     FINDINGS:  Comparison is CT dated 08/25/2023, and MRI dated 11/16/2022.  Most recent comparison is ultrasound of the kidney 02/15/2024.  Images are compromised by motion artifact.     The medial left renal mass measures 5.7 x 4.5 x 3.9 cm.  Previous CT 4.5 x 3.8 x 4 cm.     Lateral left renal lesion 2.4 cm, previous CT 2.2 cm.     Upper pole left renal lesion 1.3 cm.  Lower pole left renal lesion 1.5 cm.     Lower pole right renal lesion 1.3 cm, previously 1.6.     There is a liver lesion most likely a hemangioma.  The spleen, stomach, pancreas, and duodenum show nothing unusual.  The gallbladder, and biliary tree are unremarkable.  The adrenal glands are not enlarged.  No adenopathy seen.     Impression:     Dominant lesion medial left kidney as above.  This is slightly larger.     Left lateral renal lesion and left upper pole renal lesions as above.  There is a right lower pole renal lesion.  All these are worrisome for renal cell carcinoma.     Hemangioma in the liver.        Electronically signed by: Sridhar Whitaker MD  Date:                                            04/03/2024  Time:                                           11:36        Assessment and Plan:   Left renal mass; enlarging oncocytic neoplasm; 5.4cm    Case was discussed at  tumor board and pathology reviewed.   Given the growth rate and size there is concern for a more aggressive tumor.  Therefore we will    Schedule left partial nephrectomy 4/11    We will plan a retroperitoneal robotic approach.  However given the size and hilar location of the tumor, conversion to an open  approach and/or a total nephrectomy may be necessary    We will plan to enucleate the mass.  I explained that margins may occur, but the first concern is preserving as much renal parenchyma as possible.      Bilateral small renal masses  We discussed the natural history of small renal masses, the risk of malignancy and disease progression, and the small risk of mortality.  We discussed the risks and benefits of watchful waiting, biopsy, partial and total nephrectomy.  We discussed the benefits of renal preservation when possible.  We discussed percutaneous, laparoscopic and robotic approaches.  We discussed the management of positive surgical margins.  I answered all questions.     Plan radiographic surveillance  Will also have her see Dr Ho to discuss the option of Everolimus for the smaller renal masses.          Tuberous sclerosis  Per heme onc and genetics         Pulmonary nodules  See PCP and consider pulm clinic referral

## 2024-04-01 ENCOUNTER — LAB VISIT (OUTPATIENT)
Dept: LAB | Facility: HOSPITAL | Age: 44
End: 2024-04-01
Attending: UROLOGY
Payer: MEDICARE

## 2024-04-01 DIAGNOSIS — D30.00 RENAL ONCOCYTOMA, UNSPECIFIED LATERALITY: ICD-10-CM

## 2024-04-01 LAB
ANION GAP SERPL CALC-SCNC: 9 MMOL/L (ref 8–16)
BUN SERPL-MCNC: 9 MG/DL (ref 6–20)
CALCIUM SERPL-MCNC: 8.9 MG/DL (ref 8.7–10.5)
CHLORIDE SERPL-SCNC: 105 MMOL/L (ref 95–110)
CO2 SERPL-SCNC: 23 MMOL/L (ref 23–29)
CREAT SERPL-MCNC: 0.7 MG/DL (ref 0.5–1.4)
EST. GFR  (NO RACE VARIABLE): >60 ML/MIN/1.73 M^2
GLUCOSE SERPL-MCNC: 89 MG/DL (ref 70–110)
POTASSIUM SERPL-SCNC: 4 MMOL/L (ref 3.5–5.1)
SODIUM SERPL-SCNC: 137 MMOL/L (ref 136–145)

## 2024-04-01 PROCEDURE — 36415 COLL VENOUS BLD VENIPUNCTURE: CPT | Mod: PN | Performed by: UROLOGY

## 2024-04-01 PROCEDURE — 80048 BASIC METABOLIC PNL TOTAL CA: CPT | Performed by: UROLOGY

## 2024-04-02 NOTE — PROGRESS NOTES
MEDICAL ONCOLOGY - NEW PATIENT VISIT    Best Contact Phone Number(s): [phone] (home)      Cancer/Stage/TNM:    Cancer Staging   Renal cell carcinoma of left kidney  Staging form: Kidney, AJCC 8th Edition  - Clinical: Stage I (cT1b, cN0, cM0) - Signed by Cesar Ho MD on 4/3/2024       Reason for visit: Malorie Taylor is a 44 y.o. female with intellectual and seizure disorder found to have a leftrenal mass 10/2022 in setting of abdominal pain. 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 Subsequent imaging with progression of oncocytic neoplasm along with two smaller tumors. She presents to medical oncology clinic for intial evaluation.    History has been obtained by chart review and discussion with the patient.    HPI:     Generally at her typical baseline level of health. She saw Dr. Thomas this morning and is planning on surgery later this month. Also with MR abdomen this morning.    No new complaints or concerns. She has intermittent abdominal pain associated with her more irregular menses. No current hematuria. Appetite is good. No N/V. No CP or SOB. Has some chronic cough.     Takes topiramate, gabapentin, and carbamazepine for her seizure disorder which is well controlled. 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 No other signficant known medical conditions.      Oncology History   Renal cell carcinoma of left kidney   10/2022 Imaging Significant Findings       11/2022 Imaging Significant Findings    MR Abdomen 11/16/22  Impression:  1. Exam limited by motion artifact.  2. Solid enhancing left renal mass concerning for malignancy.  Left renal vein is patent.  No evidence of metastatic disease.  3. Right hepatic lobe cyst.  4. Hepatomegaly.     12/8/2022 Initial Diagnosis    Renal cell carcinoma of left kidney      Imaging  "Significant Findings    10/2022: US abdomen in setting of abdominal pain  Impression:  - Mild hepatomegaly.  - Ovoid isoechoic structure arising from the left kidney, favored to represent prominent renal lobulation when compared to prior CT from 2019.  Difficult to entirely exclude underlying neoplastic process.  Consider further assessment with contrast-enhanced MRI or CT renal mass protocol.     1/2023 Biopsy    1/18/23 IR biopsy left renal mass  LEFT RENAL MASS, CT-GUIDED BIOPSY WITH PATHOLOGIST ASSISTED ADEQUACY   (CYTOLOGY AND CELL BLOCK):   Oncocytic neoplasm, suggestive of low-grade oncocytic tumor.     The specimen has solid architecture, eosinophilic cells and round to oval   "low-grade" nuclei.  Immunohistochemistry demonstrates positivity for PAX8, CK7 and AMACR.  It is negative for CD10, , CK20 and vimentin.  This  morphology along with this immunohistochemical profile is suggestive of  low-grade oncocytic tumor however, taking into account possible tumor  heterogenicity, we can not rule out a high-grade oncocytic tumor or  eosinophilic variant of chromophobe renal cell carcinoma in this limited  specimen.      4/2023 Imaging Significant Findings    Impression:     Dominant lesion medial left kidney as above.  This is slightly larger.     Left lateral renal lesion and left upper pole renal lesions as above.  There is a right lower pole renal lesion.  All these are worrisome for renal cell carcinoma.     Hemangioma in the liver.     8/2023 Imaging Significant Findings    8/25/23  Impression:   Dominant left renal mass is slightly increased in size compared to November 2022.  This was previously biopsied and described as an oncocytoma on the pathology report.  I see several hypodense masses in both kidneys, the largest on the left corresponding to the abnormality seen on recent ultrasound.  These may represent additional oncocytomas.  Multiple renal masses can be seen in the setting of sarcoidosis, " particularly in this patient who has multiple small pulmonary nodules.  Multiple small renal masses can also be seen in the setting of lymphoma, though I see no evidence for splenomegaly or retroperitoneal lymph node enlargement which I would expect to see in the setting of lymphoma.  According to the electronic medical record, this patient also has a history of seizures.  Genetic testing may be helpful in potentially providing a unifying diagnosis for the seizures, multiple pulmonary nodules, and multiple renal masses if this has not already been performed.     9/2023 Notable Event    09/2023: Established with genetic clinic. 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  Given patient's history of tuberous sclerosis, multifocal micronodular pneumocyte hyperplasia (MMPH) is a consideration although not entirely specific.  The differential includes infectious, inflammatory and metastatic disease.  Surveillance is recommended with surveillance interval to be determined by pulmonary medicine.     2/2024 Imaging Significant Findings    2/15/24 US Kidney  Impression:  - Interval increase in size of the mass in the medial interpolar region of the left kidney which now appears hypoechoic relative to renal cortex.  Further evaluation with MRI renal mass protocol is recommended  - Stable isoechoic lesion in the inferior pole of left kidney.  - Likely hemangioma in the right hepatic lobe, stable.     4/3/2024 Cancer Staged    Staging form: Kidney, AJCC 8th Edition  - Clinical: Stage I (cT1b, cN0, cM0)          Past Medical History:   Diagnosis Date    Allergy     Obesity 10/31/2015    Seizures     epilepsy        Past Surgical History:   Procedure Laterality Date    BIOPSY, WITH CT GUIDANCE Left 1/18/2023    Procedure: RENAL MASS BIOPSY, WITH CT GUIDANCE;   Surgeon: Luis Eduardo Nassar MD;  Location: [facility] CATH LAB;  Service: Radiology;  Laterality: Left;        Family History   Problem Relation Age of Onset    Hyperlipidemia Mother     Hypertension Mother     Colonic polyp Mother         a couple in the '80s    Arrhythmia Father     Heart disease Father     Cholecystitis Sister     Cataracts Maternal Grandmother         in either this relative or this relative's mother    Macular degeneration Maternal Grandmother         WET - AMD    Hyperlipidemia Maternal Grandmother         later in life    Insomnia Maternal Grandmother     Hypertension Maternal Grandmother     Glaucoma Maternal Grandmother     Dementia Maternal Grandmother     Alzheimer's disease Maternal Grandmother     Heart disease Maternal Grandmother     Other Maternal Grandmother         benign breast tumor    Macular degeneration Maternal Grandfather         DRY - AMD    Vision loss Maternal Grandfather         corneal guttata (thin)    Hyperlipidemia Maternal Grandfather         later in life    Alzheimer's disease Maternal Grandfather     Hypertension Maternal Grandfather     Heart disease Paternal Grandmother     Emphysema Paternal Grandmother     Cervical cancer Paternal Grandmother     Insomnia Maternal Aunt     Glaucoma Maternal Aunt     Coronary artery disease Maternal Aunt     Sleep apnea Maternal Uncle     Macular degeneration Maternal Uncle     Diabetes Other         insulin-dependent    Breast cancer Other         age at dx unk    Lung cancer Other     Epilepsy Other     Tuberculosis Other     Heart attack Other     Heart attack Other     Heart disease Other     Glaucoma Other     Heart disease Other     Heart attack Other     Macular degeneration Other     Diverticulitis Other     Diabetes Other     Breast cancer Other 68    Heart disease Other     Kidney disease Other     Dementia Other     Heart disease Other     Heart disease Other     Acne Neg Hx     Eczema Neg Hx     Lupus Neg Hx      Psoriasis Neg Hx     Melanoma Neg Hx     Colon cancer Neg Hx     Ovarian cancer Neg Hx     Kidney cancer Neg Hx         Social History     Tobacco Use    Smoking status: Never    Smokeless tobacco: Never   Substance Use Topics    Alcohol use: Never        I have reviewed and updated the patient's past medical, surgical, family and social histories.    Review of patient's allergies indicates:   Allergen Reactions    Phenobarbital      lethargy        Current Outpatient Medications   Medication Sig Dispense Refill    b complex vitamins capsule Take 1 capsule by mouth 3 (three) times a week.      carBAMazepine (TEGRETOL XR) 400 MG Tb12 Take 1 tablet (400 mg total) by mouth 2 (two) times daily. 180 tablet 3    cranberry 500 mg Cap Take by mouth.      cranberry fruit concentrate (AZO CRANBERRY ORAL) Take by mouth.      ferrous sulfate (FEOSOL) 325 mg (65 mg iron) Tab tablet Take 325 mg by mouth daily with breakfast.      fish oil-omega-3 fatty acids 300-1,000 mg capsule Take 1 g by mouth 2 (two) times daily.      folic acid (FOLVITE) 400 MCG tablet Take 400 mcg by mouth once daily.      gabapentin (NEURONTIN) 600 MG tablet Take 2 tablets (1,200 mg total) by mouth 3 (three) times daily. 540 tablet 3    hydrocortisone (ANUSOL-HC) 2.5 % rectal cream Place rectally 2 (two) times daily. 28 g 1    inulin (FIBER GUMMIES) 2 gram Chew Take by mouth.      multivitamin capsule Take 1 capsule by mouth once daily.      polyethylene glycol (GLYCOLAX) 17 gram PwPk Take 17 g by mouth once daily.      topiramate (TOPAMAX) 200 MG Tab Take 1 tablet (200 mg total) by mouth 2 (two) times daily. 180 tablet 3    vitamin D 1000 units Tab Take 185 mg by mouth once daily.      VITAMIN E ACETATE ORAL Take by mouth.       No current facility-administered medications for this visit.        Physical Exam:   /62 (BP Location: Left arm, Patient Position: Sitting, BP Method: Large (Automatic))   Pulse 80   Temp 98.3 °F (36.8 °C) (Oral)   Resp  "18   Ht 5' 5" (1.651 m)   Wt 108.8 kg (239 lb 15.5 oz)   SpO2 100%   BMI 39.93 kg/m²      ECOG Performance status: 1            Physical Exam  Constitutional:       General: She is not in acute distress.     Appearance: She is normal weight.   HENT:      Head: Normocephalic.   Eyes:      General: No scleral icterus.     Conjunctiva/sclera: Conjunctivae normal.   Cardiovascular:      Rate and Rhythm: Normal rate.   Pulmonary:      Effort: Pulmonary effort is normal. No respiratory distress.   Abdominal:      General: There is no distension.      Palpations: Abdomen is soft.   Musculoskeletal:         General: Normal range of motion.      Cervical back: Normal range of motion and neck supple.   Skin:     General: Skin is warm.      Coloration: Skin is not jaundiced.   Neurological:      General: No focal deficit present.      Mental Status: She is alert and oriented to person, place, and time.   Psychiatric:         Mood and Affect: Mood normal.         Behavior: Behavior normal.         Thought Content: Thought content normal.           Labs:   No results found for this or any previous visit (from the past 48 hour(s)).         Imaging:       MRI Abdomen W WO Contrast  Narrative: EXAMINATION:  MRI ABDOMEN W WO CONTRAST    CLINICAL HISTORY:  Oncocytoma; TSC; consider everolimus;  Benign neoplasm of unspecified kidney    FINDINGS:  Comparison is CT dated 08/25/2023, and MRI dated 11/16/2022.  Most recent comparison is ultrasound of the kidney 02/15/2024.  Images are compromised by motion artifact.    The medial left renal mass measures 5.7 x 4.5 x 3.9 cm.  Previous CT 4.5 x 3.8 x 4 cm.    Lateral left renal lesion 2.4 cm, previous CT 2.2 cm.    Upper pole left renal lesion 1.3 cm.  Lower pole left renal lesion 1.5 cm.    Lower pole right renal lesion 1.3 cm, previously 1.6.    There is a liver lesion most likely a hemangioma.  The spleen, stomach, pancreas, and duodenum show nothing unusual.  The gallbladder, and " biliary tree are unremarkable.  The adrenal glands are not enlarged.  No adenopathy seen.  Impression: Dominant lesion medial left kidney as above.  This is slightly larger.    Left lateral renal lesion and left upper pole renal lesions as above.  There is a right lower pole renal lesion.  All these are worrisome for renal cell carcinoma.    Hemangioma in the liver.    Electronically signed by: Sridhar Whitaker MD  Date:    04/03/2024  Time:    11:36      I have personally reviewed the above imaging    Path:   Reviewed pathology as documented in oncology history      Assessment and Plan:   1. Renal cell carcinoma of left kidney  Overview:  Multifocal tumors in left kidney; dominant tumor biopsy proven oncocytic neoplasm. Plan for partial nephrectomy 04/2024    Assessment & Plan:  Plan for upcoming partial nephrectomy to remove dominant tumor. Has two smaller tumors. Presumably similar histology of the hybrid oncocytic neoplasm. Likely relatively low metastatic potential. Favor ongoing surveillance rather than started systemic therapy for these tumors. If they continue to grow and no viable surgical or local ablative options could consider starting everolimus. Additionally, no clear indication for everolimus given no known SEGA or PARTIDA. Will defer for now.   - Partial nephrectomy as planned  - Recommend ongoing surveillance q 3-6 months after surgery  - Can consider systemic therapy if ongoing growth noted  - Follow up with neurology, pulmonology, and genetics for further management of her TSC    Orders:  -     MRI Abdomen W WO Contrast; Future; Expected date: 04/03/2024  -     CBC Oncology; Standing  -     Comprehensive Metabolic Panel; Standing    2. Renal oncocytoma, unspecified laterality  -     Ambulatory referral/consult to Hematology / Oncology    3. Tuberous sclerosis  Assessment & Plan:  - Follows with neurology; no recent head imaging  - Also with apparent multifocal micronodular pneumocyte hyperplasia (MMPH)  on lung imaging  - No cutanous manifestations  - Renal tumors as above      4. Seizure disorder  Overview:  Home medications include topirimate, gabapentin, and carbamazepine.                 Follow up:   Route Chart for Scheduling    Med Onc Chart Routing      Follow up with physician 4 months.   Follow up with CHARLEY    Infusion scheduling note    Injection scheduling note    Labs CBC and CMP   Scheduling:  Preferred lab:  Lab interval:     Imaging MRI      Pharmacy appointment    Other referrals                         The above information has been reviewed with the patient and all questions have been answered to their apparent satisfaction.  They understand that they can call the clinic with any questions.    Cesar Ho MD  Hematology/Oncology  [facility]

## 2024-04-03 ENCOUNTER — OFFICE VISIT (OUTPATIENT)
Dept: HEMATOLOGY/ONCOLOGY | Facility: CLINIC | Age: 44
End: 2024-04-03
Payer: MEDICARE

## 2024-04-03 ENCOUNTER — OFFICE VISIT (OUTPATIENT)
Dept: UROLOGY | Facility: CLINIC | Age: 44
End: 2024-04-03
Payer: MEDICARE

## 2024-04-03 ENCOUNTER — HOSPITAL ENCOUNTER (OUTPATIENT)
Dept: RADIOLOGY | Facility: OTHER | Age: 44
Discharge: HOME | End: 2024-04-03
Attending: UROLOGY
Payer: MEDICARE

## 2024-04-03 VITALS
HEIGHT: 65 IN | RESPIRATION RATE: 18 BRPM | SYSTOLIC BLOOD PRESSURE: 136 MMHG | DIASTOLIC BLOOD PRESSURE: 62 MMHG | OXYGEN SATURATION: 100 % | HEART RATE: 80 BPM | TEMPERATURE: 98 F | WEIGHT: 240 LBS | BODY MASS INDEX: 39.99 KG/M2

## 2024-04-03 VITALS — HEIGHT: 65 IN | BODY MASS INDEX: 40.18 KG/M2 | WEIGHT: 241.19 LBS

## 2024-04-03 DIAGNOSIS — D30.00 RENAL ONCOCYTOMA, UNSPECIFIED LATERALITY: ICD-10-CM

## 2024-04-03 DIAGNOSIS — C64.2 RENAL CELL CARCINOMA OF LEFT KIDNEY: Primary | ICD-10-CM

## 2024-04-03 DIAGNOSIS — Q85.1 TUBEROUS SCLEROSIS: ICD-10-CM

## 2024-04-03 DIAGNOSIS — G40.909 SEIZURE DISORDER: ICD-10-CM

## 2024-04-03 DIAGNOSIS — N28.89 LEFT RENAL MASS: Primary | ICD-10-CM

## 2024-04-03 PROCEDURE — 99999 PR PBB SHADOW E&M-EST. PATIENT-LVL V: CPT | Mod: PBBFAC,,, | Performed by: HOSPITALIST

## 2024-04-03 PROCEDURE — 1159F MED LIST DOCD IN RCRD: CPT | Mod: CPTII,S$GLB,, | Performed by: HOSPITALIST

## 2024-04-03 PROCEDURE — 74183 MRI ABD W/O CNTR FLWD CNTR: CPT | Mod: 26,,, | Performed by: RADIOLOGY

## 2024-04-03 PROCEDURE — 74183 MRI ABD W/O CNTR FLWD CNTR: CPT | Mod: TC

## 2024-04-03 PROCEDURE — 3078F DIAST BP <80 MM HG: CPT | Mod: CPTII,S$GLB,, | Performed by: HOSPITALIST

## 2024-04-03 PROCEDURE — 99215 OFFICE O/P EST HI 40 MIN: CPT | Mod: S$GLB,,, | Performed by: UROLOGY

## 2024-04-03 PROCEDURE — 3075F SYST BP GE 130 - 139MM HG: CPT | Mod: CPTII,S$GLB,, | Performed by: HOSPITALIST

## 2024-04-03 PROCEDURE — 99215 OFFICE O/P EST HI 40 MIN: CPT | Mod: S$GLB,,, | Performed by: HOSPITALIST

## 2024-04-03 PROCEDURE — 1159F MED LIST DOCD IN RCRD: CPT | Mod: CPTII,S$GLB,, | Performed by: UROLOGY

## 2024-04-03 PROCEDURE — A9585 GADOBUTROL INJECTION: HCPCS | Performed by: UROLOGY

## 2024-04-03 PROCEDURE — 3008F BODY MASS INDEX DOCD: CPT | Mod: CPTII,S$GLB,, | Performed by: HOSPITALIST

## 2024-04-03 PROCEDURE — 25500020 PHARM REV CODE 255: Performed by: UROLOGY

## 2024-04-03 PROCEDURE — 3008F BODY MASS INDEX DOCD: CPT | Mod: CPTII,S$GLB,, | Performed by: UROLOGY

## 2024-04-03 RX ORDER — HEPARIN SODIUM 5000 [USP'U]/ML
5000 INJECTION, SOLUTION INTRAVENOUS; SUBCUTANEOUS
Status: CANCELLED | OUTPATIENT
Start: 2024-04-11 | End: 2024-04-11

## 2024-04-03 RX ORDER — GADOBUTROL 604.72 MG/ML
9 INJECTION INTRAVENOUS
Status: COMPLETED | OUTPATIENT
Start: 2024-04-03 | End: 2024-04-03

## 2024-04-03 RX ORDER — POLYETHYLENE GLYCOL 3350 17 G/17G
17 POWDER, FOR SOLUTION ORAL DAILY
COMMUNITY

## 2024-04-03 RX ORDER — CEFAZOLIN SODIUM 2 G/50ML
2 SOLUTION INTRAVENOUS
Status: CANCELLED | OUTPATIENT
Start: 2024-04-03

## 2024-04-03 RX ADMIN — GADOBUTROL 9 ML: 604.72 INJECTION INTRAVENOUS at 10:04

## 2024-04-03 NOTE — Clinical Note
Cesar Ms. Taylor is tuberous sclerosis and bilateral renal masses.  I am doing a partial nephrectomy 4/11 for the largest renal mass.  Can you see her to discuss everolimus for the small renal masses that she has?  Thanks.  Maxx

## 2024-04-03 NOTE — ASSESSMENT & PLAN NOTE
- Follows with neurology; no recent head imaging  - Also with apparent multifocal micronodular pneumocyte hyperplasia (MMPH) on lung imaging  - No cutanous manifestations  - Renal tumors as above

## 2024-04-03 NOTE — Clinical Note
Schedule robotic partial nephrectomy 4/11 please Post op visit with Rena Ruiz NP 2 weeks later. Thanks!

## 2024-04-03 NOTE — ASSESSMENT & PLAN NOTE
Plan for upcoming partial nephrectomy to remove dominant tumor. Has two smaller tumors. Presumably similar histology of the hybrid oncocytic neoplasm. Likely relatively low metastatic potential. Favor ongoing surveillance rather than started systemic therapy for these tumors. If they continue to grow and no viable surgical or local ablative options could consider starting everolimus. Additionally, no clear indication for everolimus given no known SEGA or PARTIDA. Will defer for now.   - Partial nephrectomy as planned  - Recommend ongoing surveillance q 3-6 months after surgery  - Can consider systemic therapy if ongoing growth noted  - Follow up with neurology, pulmonology, and genetics for further management of her TSC

## 2024-04-04 ENCOUNTER — TELEPHONE (OUTPATIENT)
Dept: UROLOGY | Facility: CLINIC | Age: 44
End: 2024-04-04
Payer: MEDICARE

## 2024-04-04 DIAGNOSIS — N28.89 LEFT RENAL MASS: Primary | ICD-10-CM

## 2024-04-04 DIAGNOSIS — Z01.818 PREOP TESTING: ICD-10-CM

## 2024-04-04 NOTE — TELEPHONE ENCOUNTER
Notified via secure chat that urine sample had spilled and was unable to be collected. Notified patient's mother. They will go to [facility] on Monday.  ----- Message from Amirah Marie sent at 4/4/2024  5:35 AM CDT -----  Regarding: Lab Client Services  Contact: [phone]  Good Morning,     My name is Amirah Marie, I work in the Lab Client Services. We had a problem with some lab work on this patient. If someone from your office could call us at [phone] or ext. [phone] that would be great. Anyone in my department can help.      Thank you,

## 2024-04-05 ENCOUNTER — PATIENT MESSAGE (OUTPATIENT)
Dept: PREADMISSION TESTING | Facility: OTHER | Age: 44
End: 2024-04-05
Payer: MEDICARE

## 2024-04-05 ENCOUNTER — TELEPHONE (OUTPATIENT)
Dept: UROLOGY | Facility: CLINIC | Age: 44
End: 2024-04-05
Payer: MEDICARE

## 2024-04-05 ENCOUNTER — ANESTHESIA EVENT (OUTPATIENT)
Dept: SURGERY | Facility: OTHER | Age: 44
DRG: 657 | End: 2024-04-05
Payer: MEDICARE

## 2024-04-05 NOTE — TELEPHONE ENCOUNTER
Spoke to lab- Urine spilled- patient will get new sample Monday.  ----- Message from Navdeep Rangel sent at 4/4/2024  9:21 PM CDT -----  Regarding: Client Services  Contact: [phone]  Good Morning,     My name is Navdeep Rangel, I work in the Lab Client Services. We had a problem with some lab work on this patient. If someone from your office could call us at [phone] or [phone] that would be great. Anyone in my department can help.      Thank you,

## 2024-04-08 ENCOUNTER — PATIENT MESSAGE (OUTPATIENT)
Dept: PREADMISSION TESTING | Facility: OTHER | Age: 44
End: 2024-04-08
Payer: MEDICARE

## 2024-04-08 ENCOUNTER — LAB VISIT (OUTPATIENT)
Dept: LAB | Facility: HOSPITAL | Age: 44
End: 2024-04-08
Attending: UROLOGY
Payer: MEDICARE

## 2024-04-08 DIAGNOSIS — Z01.818 PREOP TESTING: ICD-10-CM

## 2024-04-08 LAB
BACTERIA #/AREA URNS AUTO: ABNORMAL /HPF
BILIRUB UR QL STRIP: NEGATIVE
CLARITY UR REFRACT.AUTO: CLEAR
COLOR UR AUTO: COLORLESS
GLUCOSE UR QL STRIP: NEGATIVE
HGB UR QL STRIP: ABNORMAL
KETONES UR QL STRIP: NEGATIVE
LEUKOCYTE ESTERASE UR QL STRIP: ABNORMAL
MICROSCOPIC COMMENT: ABNORMAL
NITRITE UR QL STRIP: NEGATIVE
PH UR STRIP: 7 [PH] (ref 5–8)
PROT UR QL STRIP: NEGATIVE
RBC #/AREA URNS AUTO: 1 /HPF (ref 0–4)
SP GR UR STRIP: 1.01 (ref 1–1.03)
SQUAMOUS #/AREA URNS AUTO: 16 /HPF
URN SPEC COLLECT METH UR: ABNORMAL
WBC #/AREA URNS AUTO: 18 /HPF (ref 0–5)

## 2024-04-08 PROCEDURE — 81001 URINALYSIS AUTO W/SCOPE: CPT | Performed by: UROLOGY

## 2024-04-08 PROCEDURE — 87086 URINE CULTURE/COLONY COUNT: CPT | Performed by: UROLOGY

## 2024-04-08 NOTE — PRE-PROCEDURE INSTRUCTIONS
Information to Prepare you for your Surgery    PRE-ADMIT TESTING -  [phone]    [address]  [facility]          Your surgery has been scheduled at [facility]. We are pleased to have the opportunity to serve you. For Further Information please call [phone].    On the day of surgery please report to the Information Desk on the 1st floor.    CONTACT YOUR PHYSICIAN'S OFFICE THE DAY PRIOR TO YOUR SURGERY TO OBTAIN YOUR ARRIVAL TIME.     The evening before surgery do not eat anything after 9 p.m. ( this includes hard candy, chewing gum and mints).  You may only have GATORADE, POWERADE AND WATER  from 9 p.m. until you leave your home.   DO NOT DRINK ANY LIQUIDS ON THE WAY TO THE HOSPITAL.      Why does your anesthesiologist allow you to drink Gatorade/Powerade before surgery?  Gatorade/Powerade helps to increase your comfort before surgery and to decrease your nausea after surgery. The carbohydrates in Gatorade/Powerade help reduce your body's stress response to surgery.  If you are a diabetic-drink only water prior to surgery.    Outpatient Surgery- May allow 2 adult (18 and older) Support Persons (1 being the designated ) for all surgical/procedural patients. A breastfeeding mother will be allowed her infant and 2 adult Support Persons. No one under the age of 18 will be allowed in the building. No swapping out of visitors in the [facility].      SPECIAL MEDICATION INSTRUCTIONS: TAKE medications checked off by the Anesthesiologist on your Medication List.    Angiogram Patients: Take medications as instructed by your physician, including aspirin.     Surgery Patients:    If you take ASPIRIN - Your PHYSICIAN/SURGEON will need to inform you IF/OR when you need to stop taking aspirin prior to your surgery.     The week prior to surgery do not ot take any medications containing IBUPROFEN or NSAIDS ( Advil, Motrin, Goodys, BC, Aleve, Naproxen etc)  If you are not sure if you should take a medicine please call your surgeon's office.  Ok to take Tylenol    Do Not Wear any make-up (especially eye make-up) to surgery. Please remove any false eyelashes or eyelash extensions. If you arrive the day of surgery with makeup/eyelashes on you will be required to remove prior to surgery. (There is a risk of corneal abrasions if eye makeup/eyelash extensions are not removed)      Leave all valuables at home.   Do Not wear any jewelry or watches, including any metal in body piercings. Jewelry must be removed prior to coming to the hospital.  There is a possibility that rings that are unable to be removed may be cut off if they are on the surgical extremity.    Please remove all hair extensions, wigs, clips and any other metal accessories/ ornaments from your hair.  These items may pose a flammable/fire risk in Surgery and must be removed.    Do not shave your surgical area at least 5 days prior to your surgery. The surgical prep will be performed at the hospital according to Infection Control regulations.    Contact Lens must be removed before surgery. Either do not wear the contact lens or bring a case and solution for storage.  Please bring a container for eyeglasses or dentures as required.  Bring any paperwork your physician has provided, such as consent forms,  history and physicals, doctor's orders, etc.   Bring comfortable clothes that are loose fitting to wear upon discharge. Take into consideration the type of surgery being performed.  Maintain your diet as advised per your physician the day prior to surgery.      Adequate rest the night before surgery is advised.   Park in the Parking lot behind the hospital or in the [facility] across the street from the parking lot. Parking is complimentary.  If you will be discharged the same day as your procedure, please arrange for a responsible adult to drive you home or to accompany you if traveling by taxi.    YOU WILL NOT BE PERMITTED TO DRIVE OR TO LEAVE THE HOSPITAL ALONE AFTER SURGERY.   If you are being discharged the same day, it is strongly recommended that you arrange for someone to remain with you for the first 24 hrs following your surgery.    The Surgeon will speak to your family/visitor after your surgery regarding the outcome of your surgery and post op care.  The Surgeon may speak to you after your surgery, but there is a possibility you may not remember the details.  Please check with your family members regarding the conversation with the Surgeon.    We strongly recommend whoever is bringing you home be present for discharge instructions.  This will ensure a thorough understanding for your post op home care.          Thank you for your cooperation.  The Staff of [facility].            Bathing Instructions with Hibiclens    Shower the evening before and morning of your procedure with Chlorhexidine (Hibiclens)  do not use Chlorhexidine on your face or genitals. Do not get in your eyes.  Wash your face with water and your regular face wash/soap  Use your regular shampoo  Apply Chlorhexidine (Hibiclens) directly on your skin or on a wet washcloth and wash gently. When showering: Move away from the shower stream when applying Chlorhexidine (Hibiclens) to avoid rinsing off too soon.  Rinse thoroughly with warm water  Do not dilute Chlorhexidine (Hibiclens)   Dry off as usual, do not use any deodorant, powder, body lotions, perfume, after shave or cologne.

## 2024-04-10 ENCOUNTER — TELEPHONE (OUTPATIENT)
Dept: UROLOGY | Facility: CLINIC | Age: 44
End: 2024-04-10
Payer: MEDICARE

## 2024-04-10 LAB
BACTERIA UR CULT: NORMAL
BACTERIA UR CULT: NORMAL

## 2024-04-11 ENCOUNTER — ANESTHESIA (OUTPATIENT)
Dept: SURGERY | Facility: OTHER | Age: 44
DRG: 657 | End: 2024-04-11
Payer: MEDICARE

## 2024-04-11 ENCOUNTER — HOSPITAL ENCOUNTER (INPATIENT)
Facility: OTHER | Age: 44
LOS: 1 days | Discharge: HOME | DRG: 657 | End: 2024-04-12
Attending: UROLOGY | Admitting: UROLOGY
Payer: MEDICARE

## 2024-04-11 DIAGNOSIS — N28.89 LEFT RENAL MASS: Primary | ICD-10-CM

## 2024-04-11 LAB
ABO + RH BLD: NORMAL
ANION GAP SERPL CALC-SCNC: 9 MMOL/L (ref 8–16)
B-HCG UR QL: NEGATIVE
BASOPHILS # BLD AUTO: 0.02 K/UL (ref 0–0.2)
BASOPHILS NFR BLD: 0.4 % (ref 0–1.9)
BLD GP AB SCN CELLS X3 SERPL QL: NORMAL
BUN SERPL-MCNC: 10 MG/DL (ref 6–20)
CALCIUM SERPL-MCNC: 7.8 MG/DL (ref 8.7–10.5)
CHLORIDE SERPL-SCNC: 106 MMOL/L (ref 95–110)
CO2 SERPL-SCNC: 24 MMOL/L (ref 23–29)
CREAT SERPL-MCNC: 0.8 MG/DL (ref 0.5–1.4)
CTP QC/QA: YES
DIFFERENTIAL METHOD BLD: ABNORMAL
EOSINOPHIL # BLD AUTO: 0.1 K/UL (ref 0–0.5)
EOSINOPHIL NFR BLD: 2 % (ref 0–8)
ERYTHROCYTE [DISTWIDTH] IN BLOOD BY AUTOMATED COUNT: 11.9 % (ref 11.5–14.5)
EST. GFR  (NO RACE VARIABLE): >60 ML/MIN/1.73 M^2
GLUCOSE SERPL-MCNC: 88 MG/DL (ref 70–110)
HCT VFR BLD AUTO: 34.5 % (ref 37–48.5)
HGB BLD-MCNC: 11.6 G/DL (ref 12–16)
IMM GRANULOCYTES # BLD AUTO: 0.02 K/UL (ref 0–0.04)
IMM GRANULOCYTES NFR BLD AUTO: 0.4 % (ref 0–0.5)
LYMPHOCYTES # BLD AUTO: 1.6 K/UL (ref 1–4.8)
LYMPHOCYTES NFR BLD: 31.3 % (ref 18–48)
MCH RBC QN AUTO: 33.5 PG (ref 27–31)
MCHC RBC AUTO-ENTMCNC: 33.6 G/DL (ref 32–36)
MCV RBC AUTO: 100 FL (ref 82–98)
MONOCYTES # BLD AUTO: 0.5 K/UL (ref 0.3–1)
MONOCYTES NFR BLD: 9.8 % (ref 4–15)
NEUTROPHILS # BLD AUTO: 2.8 K/UL (ref 1.8–7.7)
NEUTROPHILS NFR BLD: 56.1 % (ref 38–73)
NRBC BLD-RTO: 0 /100 WBC
PLATELET # BLD AUTO: 287 K/UL (ref 150–450)
PMV BLD AUTO: 8.3 FL (ref 9.2–12.9)
POTASSIUM SERPL-SCNC: 4.2 MMOL/L (ref 3.5–5.1)
RBC # BLD AUTO: 3.46 M/UL (ref 4–5.4)
SODIUM SERPL-SCNC: 139 MMOL/L (ref 136–145)
SPECIMEN OUTDATE: NORMAL
WBC # BLD AUTO: 5.01 K/UL (ref 3.9–12.7)

## 2024-04-11 PROCEDURE — 85025 COMPLETE CBC W/AUTO DIFF WBC: CPT | Performed by: UROLOGY

## 2024-04-11 PROCEDURE — 94761 N-INVAS EAR/PLS OXIMETRY MLT: CPT

## 2024-04-11 PROCEDURE — 80048 BASIC METABOLIC PNL TOTAL CA: CPT | Performed by: UROLOGY

## 2024-04-11 PROCEDURE — 76942 ECHO GUIDE FOR BIOPSY: CPT | Performed by: ANESTHESIOLOGY

## 2024-04-11 PROCEDURE — 36620 INSERTION CATHETER ARTERY: CPT | Performed by: ANESTHESIOLOGY

## 2024-04-11 PROCEDURE — 36000712 HC OR TIME LEV V 1ST 15 MIN: Performed by: UROLOGY

## 2024-04-11 PROCEDURE — 76998 US GUIDE INTRAOP: CPT | Mod: 26,,, | Performed by: UROLOGY

## 2024-04-11 PROCEDURE — 64486 TAP BLOCK UNIL BY INJECTION: CPT | Performed by: ANESTHESIOLOGY

## 2024-04-11 PROCEDURE — 63600175 PHARM REV CODE 636 W HCPCS: Performed by: STUDENT IN AN ORGANIZED HEALTH CARE EDUCATION/TRAINING PROGRAM

## 2024-04-11 PROCEDURE — P9045 ALBUMIN (HUMAN), 5%, 250 ML: HCPCS | Mod: JZ,JG | Performed by: STUDENT IN AN ORGANIZED HEALTH CARE EDUCATION/TRAINING PROGRAM

## 2024-04-11 PROCEDURE — D9220A PRA ANESTHESIA: Mod: CRNA,,, | Performed by: STUDENT IN AN ORGANIZED HEALTH CARE EDUCATION/TRAINING PROGRAM

## 2024-04-11 PROCEDURE — 86920 COMPATIBILITY TEST SPIN: CPT | Performed by: UROLOGY

## 2024-04-11 PROCEDURE — 88342 IMHCHEM/IMCYTCHM 1ST ANTB: CPT | Performed by: PATHOLOGY

## 2024-04-11 PROCEDURE — 63600175 PHARM REV CODE 636 W HCPCS: Performed by: ANESTHESIOLOGY

## 2024-04-11 PROCEDURE — 88307 TISSUE EXAM BY PATHOLOGIST: CPT | Mod: 26,,, | Performed by: PATHOLOGY

## 2024-04-11 PROCEDURE — 20000000 HC ICU ROOM

## 2024-04-11 PROCEDURE — 8E0W4CZ ROBOTIC ASSISTED PROCEDURE OF TRUNK REGION, PERCUTANEOUS ENDOSCOPIC APPROACH: ICD-10-PCS | Performed by: UROLOGY

## 2024-04-11 PROCEDURE — 88313 SPECIAL STAINS GROUP 2: CPT | Performed by: PATHOLOGY

## 2024-04-11 PROCEDURE — 25000003 PHARM REV CODE 250: Performed by: ANESTHESIOLOGY

## 2024-04-11 PROCEDURE — 27201423 OPTIME MED/SURG SUP & DEVICES STERILE SUPPLY: Performed by: UROLOGY

## 2024-04-11 PROCEDURE — 63600175 PHARM REV CODE 636 W HCPCS: Performed by: UROLOGY

## 2024-04-11 PROCEDURE — 63600175 PHARM REV CODE 636 W HCPCS: Mod: JZ,JG | Performed by: ANESTHESIOLOGY

## 2024-04-11 PROCEDURE — 25000003 PHARM REV CODE 250: Performed by: STUDENT IN AN ORGANIZED HEALTH CARE EDUCATION/TRAINING PROGRAM

## 2024-04-11 PROCEDURE — 37000008 HC ANESTHESIA 1ST 15 MINUTES: Performed by: UROLOGY

## 2024-04-11 PROCEDURE — 88342 IMHCHEM/IMCYTCHM 1ST ANTB: CPT | Mod: 26,,, | Performed by: PATHOLOGY

## 2024-04-11 PROCEDURE — 63600175 PHARM REV CODE 636 W HCPCS

## 2024-04-11 PROCEDURE — 64999 UNLISTED PX NERVOUS SYSTEM: CPT | Mod: ,,, | Performed by: ANESTHESIOLOGY

## 2024-04-11 PROCEDURE — 88307 TISSUE EXAM BY PATHOLOGIST: CPT | Performed by: PATHOLOGY

## 2024-04-11 PROCEDURE — 36620 INSERTION CATHETER ARTERY: CPT | Mod: ,,, | Performed by: ANESTHESIOLOGY

## 2024-04-11 PROCEDURE — 81025 URINE PREGNANCY TEST: CPT | Performed by: ANESTHESIOLOGY

## 2024-04-11 PROCEDURE — C9290 INJ, BUPIVACAINE LIPOSOME: HCPCS | Performed by: ANESTHESIOLOGY

## 2024-04-11 PROCEDURE — 88313 SPECIAL STAINS GROUP 2: CPT | Mod: 26,,, | Performed by: PATHOLOGY

## 2024-04-11 PROCEDURE — D9220A PRA ANESTHESIA: Mod: ANES,,, | Performed by: ANESTHESIOLOGY

## 2024-04-11 PROCEDURE — 64486 TAP BLOCK UNIL BY INJECTION: CPT | Mod: 59,LT,, | Performed by: ANESTHESIOLOGY

## 2024-04-11 PROCEDURE — 37000009 HC ANESTHESIA EA ADD 15 MINS: Performed by: UROLOGY

## 2024-04-11 PROCEDURE — 88341 IMHCHEM/IMCYTCHM EA ADD ANTB: CPT | Performed by: PATHOLOGY

## 2024-04-11 PROCEDURE — 36415 COLL VENOUS BLD VENIPUNCTURE: CPT | Performed by: UROLOGY

## 2024-04-11 PROCEDURE — 88305 TISSUE EXAM BY PATHOLOGIST: CPT | Performed by: PATHOLOGY

## 2024-04-11 PROCEDURE — 86850 RBC ANTIBODY SCREEN: CPT | Performed by: UROLOGY

## 2024-04-11 PROCEDURE — 36000713 HC OR TIME LEV V EA ADD 15 MIN: Performed by: UROLOGY

## 2024-04-11 PROCEDURE — A4216 STERILE WATER/SALINE, 10 ML: HCPCS | Performed by: STUDENT IN AN ORGANIZED HEALTH CARE EDUCATION/TRAINING PROGRAM

## 2024-04-11 PROCEDURE — 64461 PVB THORACIC SINGLE INJ SITE: CPT | Performed by: ANESTHESIOLOGY

## 2024-04-11 PROCEDURE — 0TB14ZZ EXCISION OF LEFT KIDNEY, PERCUTANEOUS ENDOSCOPIC APPROACH: ICD-10-PCS | Performed by: UROLOGY

## 2024-04-11 PROCEDURE — 50543 LAPARO PARTIAL NEPHRECTOMY: CPT | Mod: 22,LT,, | Performed by: UROLOGY

## 2024-04-11 PROCEDURE — 88341 IMHCHEM/IMCYTCHM EA ADD ANTB: CPT | Mod: 26,,, | Performed by: PATHOLOGY

## 2024-04-11 RX ORDER — CELECOXIB 200 MG/1
400 CAPSULE ORAL
Status: CANCELLED | OUTPATIENT
Start: 2024-04-11 | End: 2024-04-11

## 2024-04-11 RX ORDER — ACETAMINOPHEN 325 MG/1
650 TABLET ORAL EVERY 6 HOURS
Status: DISCONTINUED | OUTPATIENT
Start: 2024-04-11 | End: 2024-04-12 | Stop reason: HOSPADM

## 2024-04-11 RX ORDER — ACETAMINOPHEN 325 MG/1
650 TABLET ORAL EVERY 8 HOURS PRN
Status: DISCONTINUED | OUTPATIENT
Start: 2024-04-11 | End: 2024-04-12 | Stop reason: HOSPADM

## 2024-04-11 RX ORDER — BUPIVACAINE HYDROCHLORIDE 2.5 MG/ML
INJECTION, SOLUTION EPIDURAL; INFILTRATION; INTRACAUDAL
Status: COMPLETED | OUTPATIENT
Start: 2024-04-11 | End: 2024-04-11

## 2024-04-11 RX ORDER — HEPARIN SODIUM 5000 [USP'U]/ML
5000 INJECTION, SOLUTION INTRAVENOUS; SUBCUTANEOUS
Status: COMPLETED | OUTPATIENT
Start: 2024-04-11 | End: 2024-04-11

## 2024-04-11 RX ORDER — METHOCARBAMOL 500 MG/1
500 TABLET, FILM COATED ORAL 4 TIMES DAILY
Status: DISCONTINUED | OUTPATIENT
Start: 2024-04-11 | End: 2024-04-12 | Stop reason: HOSPADM

## 2024-04-11 RX ORDER — LABETALOL HYDROCHLORIDE 5 MG/ML
10 INJECTION, SOLUTION INTRAVENOUS EVERY 4 HOURS PRN
Status: DISCONTINUED | OUTPATIENT
Start: 2024-04-11 | End: 2024-04-12 | Stop reason: HOSPADM

## 2024-04-11 RX ORDER — PROPOFOL 10 MG/ML
VIAL (ML) INTRAVENOUS
Status: DISCONTINUED | OUTPATIENT
Start: 2024-04-11 | End: 2024-04-11

## 2024-04-11 RX ORDER — SODIUM CHLORIDE 0.9 % (FLUSH) 0.9 %
3 SYRINGE (ML) INJECTION
Status: CANCELLED | OUTPATIENT
Start: 2024-04-11

## 2024-04-11 RX ORDER — ACETAMINOPHEN 500 MG
1000 TABLET ORAL
Status: COMPLETED | OUTPATIENT
Start: 2024-04-11 | End: 2024-04-11

## 2024-04-11 RX ORDER — OXYCODONE HYDROCHLORIDE 5 MG/1
5 TABLET ORAL
Status: CANCELLED | OUTPATIENT
Start: 2024-04-11

## 2024-04-11 RX ORDER — SODIUM CHLORIDE, SODIUM LACTATE, POTASSIUM CHLORIDE, CALCIUM CHLORIDE 600; 310; 30; 20 MG/100ML; MG/100ML; MG/100ML; MG/100ML
INJECTION, SOLUTION INTRAVENOUS CONTINUOUS
Status: DISCONTINUED | OUTPATIENT
Start: 2024-04-11 | End: 2024-04-11

## 2024-04-11 RX ORDER — ALBUMIN HUMAN 50 G/1000ML
SOLUTION INTRAVENOUS
Status: DISCONTINUED | OUTPATIENT
Start: 2024-04-11 | End: 2024-04-11

## 2024-04-11 RX ORDER — PROCHLORPERAZINE EDISYLATE 5 MG/ML
5 INJECTION INTRAMUSCULAR; INTRAVENOUS EVERY 30 MIN PRN
Status: CANCELLED | OUTPATIENT
Start: 2024-04-11

## 2024-04-11 RX ORDER — VECURONIUM BROMIDE FOR INJECTION 1 MG/ML
INJECTION, POWDER, LYOPHILIZED, FOR SOLUTION INTRAVENOUS
Status: DISCONTINUED | OUTPATIENT
Start: 2024-04-11 | End: 2024-04-11

## 2024-04-11 RX ORDER — DOCUSATE SODIUM 100 MG/1
100 CAPSULE, LIQUID FILLED ORAL DAILY
Status: DISCONTINUED | OUTPATIENT
Start: 2024-04-11 | End: 2024-04-12 | Stop reason: HOSPADM

## 2024-04-11 RX ORDER — TOPIRAMATE 100 MG/1
200 TABLET, FILM COATED ORAL 2 TIMES DAILY
Status: DISCONTINUED | OUTPATIENT
Start: 2024-04-11 | End: 2024-04-12 | Stop reason: HOSPADM

## 2024-04-11 RX ORDER — HYDROMORPHONE HYDROCHLORIDE 2 MG/ML
0.4 INJECTION, SOLUTION INTRAMUSCULAR; INTRAVENOUS; SUBCUTANEOUS EVERY 5 MIN PRN
Status: CANCELLED | OUTPATIENT
Start: 2024-04-11

## 2024-04-11 RX ORDER — LIDOCAINE HYDROCHLORIDE 20 MG/ML
INJECTION INTRAVENOUS
Status: DISCONTINUED | OUTPATIENT
Start: 2024-04-11 | End: 2024-04-11

## 2024-04-11 RX ORDER — MIDAZOLAM HYDROCHLORIDE 1 MG/ML
INJECTION INTRAMUSCULAR; INTRAVENOUS
Status: DISCONTINUED | OUTPATIENT
Start: 2024-04-11 | End: 2024-04-11

## 2024-04-11 RX ORDER — OXYCODONE HYDROCHLORIDE 10 MG/1
10 TABLET ORAL EVERY 4 HOURS PRN
Status: DISCONTINUED | OUTPATIENT
Start: 2024-04-11 | End: 2024-04-12 | Stop reason: HOSPADM

## 2024-04-11 RX ORDER — CEFAZOLIN SODIUM 1 G/3ML
2 INJECTION, POWDER, FOR SOLUTION INTRAMUSCULAR; INTRAVENOUS
Status: COMPLETED | OUTPATIENT
Start: 2024-04-11 | End: 2024-04-11

## 2024-04-11 RX ORDER — SODIUM CHLORIDE 0.9 % (FLUSH) 0.9 %
10 SYRINGE (ML) INJECTION
Status: DISCONTINUED | OUTPATIENT
Start: 2024-04-11 | End: 2024-04-12 | Stop reason: HOSPADM

## 2024-04-11 RX ORDER — FENTANYL CITRATE 50 UG/ML
INJECTION, SOLUTION INTRAMUSCULAR; INTRAVENOUS
Status: DISCONTINUED | OUTPATIENT
Start: 2024-04-11 | End: 2024-04-11

## 2024-04-11 RX ORDER — ROCURONIUM BROMIDE 10 MG/ML
INJECTION, SOLUTION INTRAVENOUS
Status: DISCONTINUED | OUTPATIENT
Start: 2024-04-11 | End: 2024-04-11

## 2024-04-11 RX ORDER — MEPERIDINE HYDROCHLORIDE 25 MG/ML
12.5 INJECTION INTRAMUSCULAR; INTRAVENOUS; SUBCUTANEOUS ONCE AS NEEDED
Status: CANCELLED | OUTPATIENT
Start: 2024-04-11 | End: 2024-04-12

## 2024-04-11 RX ORDER — SODIUM CHLORIDE, SODIUM LACTATE, POTASSIUM CHLORIDE, CALCIUM CHLORIDE 600; 310; 30; 20 MG/100ML; MG/100ML; MG/100ML; MG/100ML
INJECTION, SOLUTION INTRAVENOUS CONTINUOUS
Status: ACTIVE | OUTPATIENT
Start: 2024-04-11 | End: 2024-04-11

## 2024-04-11 RX ORDER — TALC
6 POWDER (GRAM) TOPICAL NIGHTLY PRN
Status: DISCONTINUED | OUTPATIENT
Start: 2024-04-11 | End: 2024-04-12 | Stop reason: HOSPADM

## 2024-04-11 RX ORDER — MANNITOL 250 MG/ML
INJECTION, SOLUTION INTRAVENOUS
Status: DISCONTINUED | OUTPATIENT
Start: 2024-04-11 | End: 2024-04-11

## 2024-04-11 RX ORDER — CARBAMAZEPINE 200 MG/1
400 TABLET, EXTENDED RELEASE ORAL 2 TIMES DAILY
Status: DISCONTINUED | OUTPATIENT
Start: 2024-04-11 | End: 2024-04-12 | Stop reason: HOSPADM

## 2024-04-11 RX ORDER — ROPIVACAINE HYDROCHLORIDE 5 MG/ML
INJECTION, SOLUTION EPIDURAL; INFILTRATION; PERINEURAL
Status: COMPLETED | OUTPATIENT
Start: 2024-04-11 | End: 2024-04-11

## 2024-04-11 RX ORDER — OXYCODONE HYDROCHLORIDE 5 MG/1
5 TABLET ORAL EVERY 4 HOURS PRN
Status: DISCONTINUED | OUTPATIENT
Start: 2024-04-11 | End: 2024-04-12 | Stop reason: HOSPADM

## 2024-04-11 RX ORDER — ONDANSETRON HYDROCHLORIDE 2 MG/ML
INJECTION, SOLUTION INTRAVENOUS
Status: DISCONTINUED | OUTPATIENT
Start: 2024-04-11 | End: 2024-04-11

## 2024-04-11 RX ORDER — GABAPENTIN 300 MG/1
1200 CAPSULE ORAL 3 TIMES DAILY
Status: DISCONTINUED | OUTPATIENT
Start: 2024-04-11 | End: 2024-04-12 | Stop reason: HOSPADM

## 2024-04-11 RX ORDER — LIDOCAINE HYDROCHLORIDE 10 MG/ML
1 INJECTION, SOLUTION EPIDURAL; INFILTRATION; INTRACAUDAL; PERINEURAL ONCE AS NEEDED
Status: DISCONTINUED | OUTPATIENT
Start: 2024-04-11 | End: 2024-04-12 | Stop reason: HOSPADM

## 2024-04-11 RX ORDER — POLYETHYLENE GLYCOL 3350 17 G/17G
17 POWDER, FOR SOLUTION ORAL DAILY
Status: DISCONTINUED | OUTPATIENT
Start: 2024-04-11 | End: 2024-04-12 | Stop reason: HOSPADM

## 2024-04-11 RX ORDER — PHENYLEPHRINE HYDROCHLORIDE 10 MG/ML
INJECTION INTRAVENOUS
Status: DISCONTINUED | OUTPATIENT
Start: 2024-04-11 | End: 2024-04-11

## 2024-04-11 RX ORDER — PROMETHAZINE HYDROCHLORIDE 12.5 MG/1
25 TABLET ORAL EVERY 6 HOURS PRN
Status: DISCONTINUED | OUTPATIENT
Start: 2024-04-11 | End: 2024-04-12 | Stop reason: HOSPADM

## 2024-04-11 RX ORDER — ACETAMINOPHEN 500 MG
1000 TABLET ORAL
Status: CANCELLED | OUTPATIENT
Start: 2024-04-11 | End: 2024-04-11

## 2024-04-11 RX ORDER — ONDANSETRON 8 MG/1
8 TABLET, ORALLY DISINTEGRATING ORAL EVERY 8 HOURS PRN
Status: DISCONTINUED | OUTPATIENT
Start: 2024-04-11 | End: 2024-04-12 | Stop reason: HOSPADM

## 2024-04-11 RX ORDER — DEXAMETHASONE SODIUM PHOSPHATE 4 MG/ML
INJECTION, SOLUTION INTRA-ARTICULAR; INTRALESIONAL; INTRAMUSCULAR; INTRAVENOUS; SOFT TISSUE
Status: DISCONTINUED | OUTPATIENT
Start: 2024-04-11 | End: 2024-04-11

## 2024-04-11 RX ADMIN — ACETAMINOPHEN 650 MG: 325 TABLET, FILM COATED ORAL at 05:04

## 2024-04-11 RX ADMIN — SODIUM CHLORIDE, SODIUM LACTATE, POTASSIUM CHLORIDE, AND CALCIUM CHLORIDE: .6; .31; .03; .02 INJECTION, SOLUTION INTRAVENOUS at 12:04

## 2024-04-11 RX ADMIN — PROPOFOL 50 MG: 10 INJECTION, EMULSION INTRAVENOUS at 08:04

## 2024-04-11 RX ADMIN — VECURONIUM BROMIDE FOR INJECTION 2 MG: 1 INJECTION, POWDER, LYOPHILIZED, FOR SOLUTION INTRAVENOUS at 10:04

## 2024-04-11 RX ADMIN — LIDOCAINE HYDROCHLORIDE 60 MG: 20 INJECTION, SOLUTION INTRAVENOUS at 07:04

## 2024-04-11 RX ADMIN — OXYCODONE HYDROCHLORIDE 10 MG: 10 TABLET ORAL at 02:04

## 2024-04-11 RX ADMIN — ROPIVACAINE HYDROCHLORIDE 20 ML: 5 INJECTION, SOLUTION EPIDURAL; INFILTRATION; PERINEURAL at 07:04

## 2024-04-11 RX ADMIN — PROPOFOL 200 MG: 10 INJECTION, EMULSION INTRAVENOUS at 07:04

## 2024-04-11 RX ADMIN — GABAPENTIN 1200 MG: 300 CAPSULE ORAL at 08:04

## 2024-04-11 RX ADMIN — ROCURONIUM BROMIDE 30 MG: 10 SOLUTION INTRAVENOUS at 08:04

## 2024-04-11 RX ADMIN — SODIUM CHLORIDE 0.5 MCG/KG/HR: 9 INJECTION, SOLUTION INTRAMUSCULAR; INTRAVENOUS; SUBCUTANEOUS at 07:04

## 2024-04-11 RX ADMIN — ACETAMINOPHEN 1000 MG: 500 TABLET ORAL at 06:04

## 2024-04-11 RX ADMIN — ROCURONIUM BROMIDE 20 MG: 10 SOLUTION INTRAVENOUS at 08:04

## 2024-04-11 RX ADMIN — CEFAZOLIN 2 G: 330 INJECTION, POWDER, FOR SOLUTION INTRAMUSCULAR; INTRAVENOUS at 11:04

## 2024-04-11 RX ADMIN — VECURONIUM BROMIDE FOR INJECTION 3 MG: 1 INJECTION, POWDER, LYOPHILIZED, FOR SOLUTION INTRAVENOUS at 11:04

## 2024-04-11 RX ADMIN — MIDAZOLAM HYDROCHLORIDE 1 MG: 1 INJECTION INTRAMUSCULAR; INTRAVENOUS at 06:04

## 2024-04-11 RX ADMIN — METHOCARBAMOL 500 MG: 500 TABLET ORAL at 05:04

## 2024-04-11 RX ADMIN — ACETAMINOPHEN 650 MG: 325 TABLET, FILM COATED ORAL at 01:04

## 2024-04-11 RX ADMIN — METHOCARBAMOL 500 MG: 500 TABLET ORAL at 01:04

## 2024-04-11 RX ADMIN — TOPIRAMATE 200 MG: 100 TABLET, FILM COATED ORAL at 08:04

## 2024-04-11 RX ADMIN — CARBAMAZEPINE 400 MG: 200 TABLET, EXTENDED RELEASE ORAL at 08:04

## 2024-04-11 RX ADMIN — SODIUM CHLORIDE, POTASSIUM CHLORIDE, SODIUM LACTATE AND CALCIUM CHLORIDE: 600; 310; 30; 20 INJECTION, SOLUTION INTRAVENOUS at 02:04

## 2024-04-11 RX ADMIN — OXYCODONE HYDROCHLORIDE 10 MG: 10 TABLET ORAL at 11:04

## 2024-04-11 RX ADMIN — DOCUSATE SODIUM 100 MG: 100 CAPSULE, LIQUID FILLED ORAL at 01:04

## 2024-04-11 RX ADMIN — BUPIVACAINE HYDROCHLORIDE 20 ML: 2.5 INJECTION, SOLUTION EPIDURAL; INFILTRATION; INTRACAUDAL; PERINEURAL at 06:04

## 2024-04-11 RX ADMIN — VECURONIUM BROMIDE FOR INJECTION 3 MG: 1 INJECTION, POWDER, LYOPHILIZED, FOR SOLUTION INTRAVENOUS at 09:04

## 2024-04-11 RX ADMIN — ALBUMIN (HUMAN) 500 ML: 12.5 SOLUTION INTRAVENOUS at 07:04

## 2024-04-11 RX ADMIN — ROCURONIUM BROMIDE 50 MG: 10 SOLUTION INTRAVENOUS at 07:04

## 2024-04-11 RX ADMIN — METHOCARBAMOL 500 MG: 500 TABLET ORAL at 08:04

## 2024-04-11 RX ADMIN — GLYCOPYRROLATE 0.2 MG: 0.2 INJECTION, SOLUTION INTRAMUSCULAR; INTRAVITREAL at 07:04

## 2024-04-11 RX ADMIN — PHENYLEPHRINE HYDROCHLORIDE 100 MCG: 10 INJECTION INTRAVENOUS at 11:04

## 2024-04-11 RX ADMIN — PHENYLEPHRINE HYDROCHLORIDE 0.3 MCG/KG/MIN: 10 INJECTION INTRAVENOUS at 07:04

## 2024-04-11 RX ADMIN — ONDANSETRON HYDROCHLORIDE 4 MG: 2 INJECTION INTRAMUSCULAR; INTRAVENOUS at 07:04

## 2024-04-11 RX ADMIN — FENTANYL CITRATE 50 MCG: 50 INJECTION, SOLUTION INTRAMUSCULAR; INTRAVENOUS at 06:04

## 2024-04-11 RX ADMIN — ACETAMINOPHEN 650 MG: 325 TABLET, FILM COATED ORAL at 11:04

## 2024-04-11 RX ADMIN — CEFAZOLIN 2 G: 330 INJECTION, POWDER, FOR SOLUTION INTRAMUSCULAR; INTRAVENOUS at 07:04

## 2024-04-11 RX ADMIN — BUPIVACAINE 10 ML: 13.3 INJECTION, SUSPENSION, LIPOSOMAL INFILTRATION at 06:04

## 2024-04-11 RX ADMIN — GABAPENTIN 1200 MG: 300 CAPSULE ORAL at 02:04

## 2024-04-11 RX ADMIN — MANNITOL 12.5 G: 12.5 INJECTION, SOLUTION INTRAVENOUS at 11:04

## 2024-04-11 RX ADMIN — ALBUMIN (HUMAN) 500 ML: 12.5 SOLUTION INTRAVENOUS at 10:04

## 2024-04-11 RX ADMIN — HEPARIN SODIUM 5000 UNITS: 5000 INJECTION INTRAVENOUS; SUBCUTANEOUS at 06:04

## 2024-04-11 RX ADMIN — SUGAMMADEX 200 MG: 100 INJECTION, SOLUTION INTRAVENOUS at 12:04

## 2024-04-11 RX ADMIN — SODIUM CHLORIDE, SODIUM LACTATE, POTASSIUM CHLORIDE, AND CALCIUM CHLORIDE: .6; .31; .03; .02 INJECTION, SOLUTION INTRAVENOUS at 07:04

## 2024-04-11 RX ADMIN — MELATONIN TAB 3 MG 6 MG: 3 TAB at 08:04

## 2024-04-11 RX ADMIN — POLYETHYLENE GLYCOL 3350 17 G: 17 POWDER, FOR SOLUTION ORAL at 01:04

## 2024-04-11 RX ADMIN — OXYCODONE HYDROCHLORIDE 10 MG: 10 TABLET ORAL at 07:04

## 2024-04-11 RX ADMIN — DEXAMETHASONE SODIUM PHOSPHATE 8 MG: 4 INJECTION, SOLUTION INTRAMUSCULAR; INTRAVENOUS at 07:04

## 2024-04-11 NOTE — OP NOTE
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  Left robotic retroperitoneal partial nephrectomy     Specimen(s):    Left renal mass  Fat overlying tumor     Surgeon: Maxx Thomas MD     Bedside assistant: Nadia Key     Assistant: Saqib Chappell MD      Anesthesia: General endotracheal anesthesia     Indications: Malorie Taylor is a 44 y.o. female  with a left renal mass suspicious for renal cell carcinoma. After discussion of management options and risks and benefits associated with each the patient has elected to pursue surgical management.     Findings:   - Single artery, single vein  - Exophytic tumor able to be enucleated, warm ischemia time 27 minutes     EBL: 100 mL     Drains:   1.  20 Fr perales catheter     Anesthesia: General endotracheal anesthesia     Complications:  none     Procedure in Detail: After discussion of risks and benefits of the procedure, informed consent was obtained.  The patient was brought to the operating room and placed supine on the operating table.  SCDs were applied and working prior to induction of anesthesia.  General anesthesia was administered. A 20 Fr Perales catheter was placed in the standard fashion with 10 ml sterile water used to inflate the balloon. An OG tube was placed. The patient was then moved into the full flank position with the left side up. The patient was  appropriately padded and secured to the table. The patient was then prepped and draped in the usual sterile fashion. Timeout was performed and preoperative antibiotics were confirmed.       The location of the camera port was marked one finger breath above the ASIS in the mid axillary line. This was incised sharply and carried down through the subcutaneous tissue. The lumbodorsal fascia was entered using a tonsil. The retroperitoneum was palpated and dissected bluntly to ensure there was adequate room for the balloon dilator. The retroperitoneal balloon dilator was inserted through the incision and into the retroperitoneum. An 8 mm trocar was inserted through the device, and the balloon was inflated under direct vision. The psoas was identified and the edge of peritoneum was identified. Once the space was adequately dilated the balloon dilator was deflated and removed. The robotic Yumiko port was inserted and the retroperitoneum was insufflated.     Using direct vision the posterior lateral most port site was positioned 6 cm lateral to the camera port. This was inserted under direct vision. The peritoneum was blunted dissected off of the transversalis muscle on the anterior abdominal wall medially using a laparoscopic Kitner. This created space for an additional robotic ports. An 8 mm robotic port was placed medially in a modified arch cephalad ensuring 6 cm of space between ports. A 12 mm assistant port was placed 8 cm inferior to the most medial port. The final robotic trocar was passed laterally 6 cm from the lateral most port. Each trocar was introduced under direct vision ensuring no injury. The robot was docked and the ports were optimized.    The paranephric fat was mobilized to improve visualization. The Psoas was identified. Intraoperative ultrasound was utilized for orientation, the kidney was visualized using the ultrasound. Gerota's fascia was entered. Dissection was directed posteriorly, and we  identified the renal hilum. 1 artery was identified and dissected circumferentially. The ureter was identified.     The perinephric fat overlying the tumor and overlying the kidney was then dissected to identify the mass. The ultrasound probe was introduced and was used to delineate margins. The needle drivers were then tested adequate.    The renal artery was then clamped with two bulldog clamps.  The mass was then excised using the robotic scissors. Grossly negative margins were achieved. There was no obvious entry into the collecting system. A stratafix suture was then used to perform a running stitch of the renorrhaphy bed. A stratafix was then used to perform a sliding Hem O Lock renorrhaphy in standard fashion.  Warm ischemia time was 27 minutes.  There was adequate hemostasis at the end of the renorrhaphy.  The specimen was then placed in an Endo-Catch bag.  FloSeal was then applied to the resection bed.     The robot was undocked and all trocars were removed.  The camera port incision was extended slightly to allow removal of the specimen. This was inspected and found to be in tact.  This was passed off the field for pathologic analysis. The extraction site fascia was closed using 0 ethibond. All skin incisions were closed using 4-0 monocryl. Dermabond was applied to the incisions.     The patient tolerated the procedure well and was transferred to the recovery room in stable condition.    Disposition: The patient will remain on the urology service overnight for observation.     Saqib Chappell MD

## 2024-04-11 NOTE — ANESTHESIA PROCEDURE NOTES
Intubation    Date/Time: 4/11/2024 7:33 AM    Performed by: Mahad Reyes CRNA  Authorized by: Davis Warner MD    Intubation:     Induction:  Intravenous    Intubated:  Postinduction    Mask Ventilation:  Easy mask    Attempts:  1    Attempted By:  CRNA    Method of Intubation:  Video laryngoscopy    Blade:  Bhat 3    Laryngeal View Grade: Grade I - full view of cords      Difficult Airway Encountered?: No      Complications:  None    Airway Device:  Oral endotracheal tube    Airway Device Size:  7.5    Style/Cuff Inflation:  Cuffed (inflated to minimal occlusive pressure)    Tube secured:  22    Secured at:  The lips    Placement Verified By:  Capnometry    Complicating Factors:  None    Findings Post-Intubation:  BS equal bilateral and atraumatic/condition of teeth unchanged

## 2024-04-11 NOTE — ANESTHESIA PROCEDURE NOTES
Right radial    Diagnosis: Surgery    Patient location during procedure: holding area  Timeout: 4/11/2024 7:02 AM  Procedure end time: 4/11/2024 7:05 AM    Staffing  Authorizing Provider: Davis Warner MD  Performing Provider: Chepe Sutherland MD    Staffing  Performed by: Chepe Sutherland MD  Authorized by: Davis Warner MD    Anesthesiologist was present at the time of the procedure.    Preanesthetic Checklist  Completed: patient identified, IV checked, site marked, risks and benefits discussed, surgical consent, monitors and equipment checked, pre-op evaluation, timeout performed and anesthesia consent givenRight radial  Skin Prep: chlorhexidine gluconate  Local Infiltration: lidocaine  Orientation: right  Location: radial    Catheter Size: 20 G  Catheter placement by Ultrasound guidance and Anatomical landmarks. Heme positive aspiration all ports.   Vessel Caliber: medium, patent, compressibility normal  Vascular Doppler:  not done  Needle advanced into vessel with real time Ultrasound guidance.  Guidewire confirmed in vessel.  Sterile sheath used.Insertion Attempts: 1  Assessment  Dressing: secured with tape and tegaderm  Patient: Tolerated well

## 2024-04-11 NOTE — PROGRESS NOTES
Urology Progress Note    Patient seen and examined.    Doing well POD 0 s/p robotic L partial nephrectomy  Pain fairly well controlled  Tolerating diet  No nausea, vomiting  Discussed operative findings, encouraged her to go for at least one walk tonight    Please call with further questions or concerns.    Saqib Chappell MD  Urology, PGY-4  [facility]

## 2024-04-11 NOTE — PLAN OF CARE
LMSW met with the patient and family at the bedside. Patient denies the use of HH or DME. Patients PCP is correct on the face sheet. Patient choice pharmacy is bedside. Patient's family will transport the patient home at discharge.     [facility] - Intensive Care ([facility])  Initial Discharge Assessment       Primary Care Provider: Martita Rico MD    Admission Diagnosis: Left renal mass [N28.89]    Admission Date: 4/11/2024  Expected Discharge Date:     Transition of Care Barriers: (P) None    Payor: [employer] MANAGED MCARE / Plan: [employer] MEDICARE ADVANTAGE / Product Type: Medicare Advantage /     Extended Emergency Contact Information  Primary Emergency Contact: Dani Taylor  Mobile Phone: [phone]  Relation: Father    Discharge Plan A: (P) Home with family, Home         [id number] - [place], [place] - [address] AT [facility]  [address]  [place] [address]  Phone: [phone] Fax: [phone]      Initial Assessment (most recent)       Adult Discharge Assessment - 04/11/24 1600          Discharge Assessment    Assessment Type Discharge Planning Assessment (P)      Confirmed/corrected address, phone number and insurance Yes (P)      Confirmed Demographics Correct on Facesheet (P)      Source of Information family;health record (P)      People in Home parent(s) (P)      Do you expect to return to your current living situation? Yes (P)      Do you have help at home or someone to help you manage your care at home? Yes (P)      Prior to hospitilization cognitive status: Unable to Assess (P)      Current cognitive status: Unable to Assess (P)      Equipment Currently Used at Home none (P)      Readmission within 30 days? No (P)      Patient currently being followed by outpatient case management? No (P)      Do you currently have service(s) that help you manage your care at home? No (P)      Do you take prescription medications? Yes (P)      Do you  have prescription coverage? Yes (P)      Do you have any problems affording any of your prescribed medications? No (P)      Is the patient taking medications as prescribed? yes (P)      How do you get to doctors appointments? family or friend will provide (P)      Are you on dialysis? No (P)      Do you take coumadin? No (P)      Discharge Plan A Home with family;Home (P)      Discharge Plan discussed with: Parent(s) (P)      Transition of Care Barriers None (P)

## 2024-04-11 NOTE — TRANSFER OF CARE
"Anesthesia Transfer of Care Note    Patient: Malorie Taylor    Procedure(s) Performed: Procedure(s) (LRB):  ROBOTIC NEPHRECTOMY, PARTIAL (Left)    Patient location: ICU    Anesthesia Type: general    Transport from OR: Transported from OR on 6-10 L/min O2 by face mask with adequate spontaneous ventilation    Post pain: adequate analgesia    Post assessment: no apparent anesthetic complications and tolerated procedure well    Post vital signs: stable    Level of consciousness: awake and responds to stimulation    Nausea/Vomiting: no nausea/vomiting    Complications: none    Transfer of care protocol was followed      Last vitals: Visit Vitals  /87 (BP Location: Left arm, Patient Position: Sitting)   Pulse 72   Temp 36.6 °C (97.8 °F) (Oral)   Resp 18   Ht 5' 7" (1.702 m)   Wt 108.4 kg (238 lb 15.7 oz)   LMP 04/01/2024 (Approximate)   SpO2 100%   Breastfeeding No   BMI 37.43 kg/m²     "

## 2024-04-11 NOTE — ANESTHESIA PROCEDURE NOTES
Left erector spinae  block    Patient location during procedure: holding area   Block not for primary anesthetic.  Reason for block: at surgeon's request and post-op pain management   Post-op Pain Location: Left flank   Timeout: 4/11/2024 6:58 AM   End time: 4/11/2024 7:02 AM    Staffing  Authorizing Provider: Davis Warner MD  Performing Provider: Chepe Sutherland MD    Staffing  Performed by: Chepe Sutherland MD  Authorized by: Davis Warner MD    Preanesthetic Checklist  Completed: patient identified, IV checked, site marked, risks and benefits discussed, surgical consent, monitors and equipment checked, pre-op evaluation and timeout performed  Peripheral Block  Patient position: left lateral decubitus  Prep: ChloraPrep and site prepped and draped  Patient monitoring: heart rate and continuous pulse ox  Block type: erector spinae plane  Laterality: left  Injection technique: single shot  Interspace: T9-10    Needle  Needle type: Echogenic   Needle gauge: 20 G  Needle length: 4 in  Needle localization: anatomical landmarks and ultrasound guidance   -ultrasound image captured on disc.  Assessment  Injection assessment: negative aspiration, negative parasthesia and local visualized surrounding nerve  Paresthesia pain: none  Heart rate change: no  Slow fractionated injection: yes  Pain Tolerance: comfortable throughout block and no complaints  Medications:    Medications: ropivacaine (NAROPIN) injection 0.5% - Perineural   20 mL - 4/11/2024 7:02:00 AM

## 2024-04-11 NOTE — ANESTHESIA PREPROCEDURE EVALUATION
04/11/2024  Malorie Taylor is a 44 y.o., female.      Pre-op Assessment    I have reviewed the Patient Summary Reports.     I have reviewed the Nursing Notes. I have reviewed the NPO Status.   I have reviewed the Medications.     Review of Systems  Anesthesia Hx:  No problems with previous Anesthesia                Social:  Non-Smoker       Hematology/Oncology:                      Current/Recent Cancer.            Oncology Comments: RCC left kidney     EENT/Dental:  EENT/Dental Normal           Cardiovascular:  Exercise tolerance: good                                           Pulmonary:  Pulmonary Normal                       Renal/:  Chronic Renal Disease                Hepatic/GI:      Liver Disease,            Neurological:       Seizures, well controlled                                Endocrine:        Obesity / BMI > 30  Psych:  Psychiatric History anxiety depression                Physical Exam  General: Cooperative and Alert    Airway:  Mallampati: II   Mouth Opening: Normal  TM Distance: Normal  Tongue: Normal  Neck ROM: Normal ROM    Dental:  Intact        Anesthesia Plan  Type of Anesthesia, risks & benefits discussed:    Anesthesia Type: Gen ETT  Post Op Pain Control Plan: multimodal analgesia and peripheral nerve block  Induction:  IV  Airway Plan: Video  Informed Consent: Informed consent signed with the Patient and all parties understand the risks and agree with anesthesia plan.  All questions answered.   ASA Score: 3  Anesthesia Plan Notes:  Left Ventricle: The left ventricle is normal in size. Normal wall thickness. There is normal systolic function with a visually estimated ejection fraction of 60 - 65%. Global longitudinal strain is -16.5%.    tuberous sclerosis.    Ready For Surgery From Anesthesia Perspective.     .

## 2024-04-11 NOTE — INTERVAL H&P NOTE
The patient has been examined and the H&P has been reviewed:    I concur with the findings and no changes have occurred since H&P was written.    Surgery risks, benefits and alternative options discussed and understood by patient/family.    Side verified and marked  Answered all questions  Again discussed the possibility of open surgery or total nephrectomy      There are no hospital problems to display for this patient.

## 2024-04-11 NOTE — ANESTHESIA POSTPROCEDURE EVALUATION
Anesthesia Post Evaluation    Patient: Malorie Taylor    Procedure(s) Performed: Procedure(s) (LRB):  ROBOTIC NEPHRECTOMY, PARTIAL (Left)    Final Anesthesia Type: general      Patient location during evaluation: ICU  Patient participation: Yes- Able to Participate  Level of consciousness: awake and alert  Post-procedure vital signs: reviewed and stable  Pain management: adequate  Airway patency: patent  LOREE mitigation strategies: Extubation while patient is awake  PONV status at discharge: No PONV  Anesthetic complications: no      Cardiovascular status: hemodynamically stable  Respiratory status: unassisted  Hydration status: euvolemic  Follow-up not needed.              Vitals Value Taken Time   /80 04/11/24 1255   Temp 98 04/11/24 1255   Pulse 70 04/11/24 1255   Resp 16 04/11/24 1255   SpO2 100 04/11/24 1255         No case tracking events are documented in the log.      Pain/Stephanie Score: Pain Rating Prior to Med Admin: 0 (preop) (4/11/2024  6:12 AM)

## 2024-04-11 NOTE — ANESTHESIA PROCEDURE NOTES
Left TAP    Patient location during procedure: holding area   Block not for primary anesthetic.  Reason for block: at surgeon's request and post-op pain management   Post-op Pain Location: Left flank   Timeout: 4/11/2024 6:50 AM   End time: 4/11/2024 6:55 AM    Staffing  Authorizing Provider: Davis Warner MD  Performing Provider: Chepe Sutherland MD    Staffing  Performed by: Chepe Sutherland MD  Authorized by: Davis Warner MD    Preanesthetic Checklist  Completed: patient identified, IV checked, site marked, risks and benefits discussed, surgical consent, monitors and equipment checked, pre-op evaluation and timeout performed  Peripheral Block  Patient position: supine  Prep: ChloraPrep and site prepped and draped  Patient monitoring: heart rate and continuous pulse ox  Block type: TAP  Laterality: left  Injection technique: single shot  Needle  Needle type: Echogenic   Needle gauge: 20 G  Needle length: 4 in  Needle localization: anatomical landmarks and ultrasound guidance   -ultrasound image captured on disc.  Assessment  Injection assessment: negative aspiration, negative parasthesia and local visualized surrounding nerve  Paresthesia pain: none  Heart rate change: no  Slow fractionated injection: yes  Pain Tolerance: comfortable throughout block and no complaints  Medications:    Medications: bupivacaine (pf) (MARCAINE) injection 0.25% - Perineural   20 mL - 4/11/2024 6:55:00 AM    Additional Notes  Local visualized to spread between internal oblique and transversus abdominis   Left side injected with 20 cc 0.25% Marcaine plus 10 cc Exparel

## 2024-04-12 VITALS
OXYGEN SATURATION: 98 % | RESPIRATION RATE: 20 BRPM | DIASTOLIC BLOOD PRESSURE: 55 MMHG | HEIGHT: 67 IN | WEIGHT: 255.75 LBS | BODY MASS INDEX: 40.14 KG/M2 | TEMPERATURE: 99 F | HEART RATE: 94 BPM | SYSTOLIC BLOOD PRESSURE: 117 MMHG

## 2024-04-12 LAB
ANION GAP SERPL CALC-SCNC: 9 MMOL/L (ref 8–16)
BASOPHILS # BLD AUTO: 0.01 K/UL (ref 0–0.2)
BASOPHILS NFR BLD: 0.2 % (ref 0–1.9)
BLD PROD TYP BPU: NORMAL
BLD PROD TYP BPU: NORMAL
BLOOD UNIT EXPIRATION DATE: NORMAL
BLOOD UNIT EXPIRATION DATE: NORMAL
BLOOD UNIT TYPE CODE: 5100
BLOOD UNIT TYPE CODE: 5100
BLOOD UNIT TYPE: NORMAL
BLOOD UNIT TYPE: NORMAL
BUN SERPL-MCNC: 13 MG/DL (ref 6–20)
CALCIUM SERPL-MCNC: 8.4 MG/DL (ref 8.7–10.5)
CHLORIDE SERPL-SCNC: 109 MMOL/L (ref 95–110)
CO2 SERPL-SCNC: 20 MMOL/L (ref 23–29)
CODING SYSTEM: NORMAL
CODING SYSTEM: NORMAL
CREAT SERPL-MCNC: 0.8 MG/DL (ref 0.5–1.4)
CROSSMATCH INTERPRETATION: NORMAL
CROSSMATCH INTERPRETATION: NORMAL
DIFFERENTIAL METHOD BLD: ABNORMAL
DISPENSE STATUS: NORMAL
DISPENSE STATUS: NORMAL
EOSINOPHIL # BLD AUTO: 0 K/UL (ref 0–0.5)
EOSINOPHIL NFR BLD: 0.3 % (ref 0–8)
ERYTHROCYTE [DISTWIDTH] IN BLOOD BY AUTOMATED COUNT: 12.2 % (ref 11.5–14.5)
EST. GFR  (NO RACE VARIABLE): >60 ML/MIN/1.73 M^2
GLUCOSE SERPL-MCNC: 105 MG/DL (ref 70–110)
HCT VFR BLD AUTO: 28.1 % (ref 37–48.5)
HGB BLD-MCNC: 9.4 G/DL (ref 12–16)
IMM GRANULOCYTES # BLD AUTO: 0.02 K/UL (ref 0–0.04)
IMM GRANULOCYTES NFR BLD AUTO: 0.3 % (ref 0–0.5)
LYMPHOCYTES # BLD AUTO: 2 K/UL (ref 1–4.8)
LYMPHOCYTES NFR BLD: 31.5 % (ref 18–48)
MCH RBC QN AUTO: 33.8 PG (ref 27–31)
MCHC RBC AUTO-ENTMCNC: 33.5 G/DL (ref 32–36)
MCV RBC AUTO: 101 FL (ref 82–98)
MONOCYTES # BLD AUTO: 0.5 K/UL (ref 0.3–1)
MONOCYTES NFR BLD: 8 % (ref 4–15)
NEUTROPHILS # BLD AUTO: 3.8 K/UL (ref 1.8–7.7)
NEUTROPHILS NFR BLD: 59.7 % (ref 38–73)
NRBC BLD-RTO: 0 /100 WBC
NUM UNITS TRANS PACKED RBC: NORMAL
NUM UNITS TRANS PACKED RBC: NORMAL
PLATELET # BLD AUTO: 227 K/UL (ref 150–450)
PMV BLD AUTO: 8.5 FL (ref 9.2–12.9)
POTASSIUM SERPL-SCNC: 3.7 MMOL/L (ref 3.5–5.1)
RBC # BLD AUTO: 2.78 M/UL (ref 4–5.4)
SODIUM SERPL-SCNC: 138 MMOL/L (ref 136–145)
WBC # BLD AUTO: 6.34 K/UL (ref 3.9–12.7)

## 2024-04-12 PROCEDURE — 36415 COLL VENOUS BLD VENIPUNCTURE: CPT | Performed by: STUDENT IN AN ORGANIZED HEALTH CARE EDUCATION/TRAINING PROGRAM

## 2024-04-12 PROCEDURE — 25000003 PHARM REV CODE 250: Performed by: STUDENT IN AN ORGANIZED HEALTH CARE EDUCATION/TRAINING PROGRAM

## 2024-04-12 PROCEDURE — 80048 BASIC METABOLIC PNL TOTAL CA: CPT | Performed by: STUDENT IN AN ORGANIZED HEALTH CARE EDUCATION/TRAINING PROGRAM

## 2024-04-12 PROCEDURE — 85025 COMPLETE CBC W/AUTO DIFF WBC: CPT | Performed by: STUDENT IN AN ORGANIZED HEALTH CARE EDUCATION/TRAINING PROGRAM

## 2024-04-12 RX ORDER — HEPARIN SODIUM 5000 [USP'U]/ML
5000 INJECTION, SOLUTION INTRAVENOUS; SUBCUTANEOUS EVERY 8 HOURS
Status: DISCONTINUED | OUTPATIENT
Start: 2024-04-12 | End: 2024-04-12 | Stop reason: HOSPADM

## 2024-04-12 RX ORDER — POLYETHYLENE GLYCOL 3350 17 G/17G
17 POWDER, FOR SOLUTION ORAL DAILY
Qty: 238 G | Refills: 0 | Status: SHIPPED | OUTPATIENT
Start: 2024-04-12

## 2024-04-12 RX ORDER — OXYCODONE HYDROCHLORIDE 5 MG/1
5 TABLET ORAL EVERY 4 HOURS PRN
Qty: 10 TABLET | Refills: 0 | Status: SHIPPED | OUTPATIENT
Start: 2024-04-12

## 2024-04-12 RX ORDER — ACETAMINOPHEN 500 MG
1000 TABLET ORAL EVERY 8 HOURS
Qty: 42 TABLET | Refills: 0 | Status: SHIPPED | OUTPATIENT
Start: 2024-04-12 | End: 2024-04-19

## 2024-04-12 RX ORDER — IBUPROFEN 800 MG/1
800 TABLET ORAL 3 TIMES DAILY
Qty: 21 TABLET | Refills: 0 | Status: SHIPPED | OUTPATIENT
Start: 2024-04-12 | End: 2024-04-19

## 2024-04-12 RX ADMIN — OXYCODONE 5 MG: 5 TABLET ORAL at 08:04

## 2024-04-12 RX ADMIN — DOCUSATE SODIUM 100 MG: 100 CAPSULE, LIQUID FILLED ORAL at 08:04

## 2024-04-12 RX ADMIN — ACETAMINOPHEN 650 MG: 325 TABLET, FILM COATED ORAL at 12:04

## 2024-04-12 RX ADMIN — GABAPENTIN 1200 MG: 300 CAPSULE ORAL at 08:04

## 2024-04-12 RX ADMIN — OXYCODONE HYDROCHLORIDE 10 MG: 10 TABLET ORAL at 03:04

## 2024-04-12 RX ADMIN — TOPIRAMATE 200 MG: 100 TABLET, FILM COATED ORAL at 08:04

## 2024-04-12 RX ADMIN — POLYETHYLENE GLYCOL 3350 17 G: 17 POWDER, FOR SOLUTION ORAL at 08:04

## 2024-04-12 RX ADMIN — OXYCODONE HYDROCHLORIDE 10 MG: 10 TABLET ORAL at 12:04

## 2024-04-12 RX ADMIN — CARBAMAZEPINE 400 MG: 200 TABLET, EXTENDED RELEASE ORAL at 09:04

## 2024-04-12 RX ADMIN — METHOCARBAMOL 500 MG: 500 TABLET ORAL at 12:04

## 2024-04-12 RX ADMIN — METHOCARBAMOL 500 MG: 500 TABLET ORAL at 09:04

## 2024-04-12 NOTE — HPI
Ms. Taylor is a 44/F with PMH seizure disorder, tuberous sclerosis, RCC of L kidney who presented to [facility] 04/11 for planned robotic partial nephrectomy with Dr. Thomas. She is consulted on for medical co-management in the post-operative period. At time of exam she has no questions or concerns.

## 2024-04-12 NOTE — PROGRESS NOTES
[facility] Intensive Care [facility]  Urology  Progress Note    Patient Name: Malorie Taylor  MRN: [id number]  Admission Date: 4/11/2024  Hospital Length of Stay: 1 days  Code Status: Full Code   Attending Provider: Maxx Thomas MD   Primary Care Physician: Martita Rico MD    Subjective:     HPI:  Malorie Taylor is a 43 yo F with a history of L renal mass s/p robotic L partial nephrectomy on 4/11/24    Interval History: NAEON. Pain well controlled, tolerating diet. No nausea, vomiting. No flatus, BM. Ambulating normally.    Objective:     Temp:  [94.7 °F (34.8 °C)-98.5 °F (36.9 °C)] 98.5 °F (36.9 °C)  Pulse:  [63-96] 79  Resp:  [14-21] 14  SpO2:  [95 %-100 %] 95 %  BP: (107-131)/(53-74) 107/54  Arterial Line BP: (120-121)/(69-75) 120/75     Body mass index is 40.05 kg/m².           Drains       Drain  Duration                  Urethral Catheter 04/11/24 0843 Non-latex 20 Fr. <1 day                     Physical Exam  Vitals reviewed.   Constitutional:       General: She is not in acute distress.     Appearance: She is well-developed. She is not diaphoretic.   HENT:      Head: Normocephalic and atraumatic.   Eyes:      General: No scleral icterus.  Cardiovascular:      Rate and Rhythm: Normal rate.   Pulmonary:      Effort: Pulmonary effort is normal. No respiratory distress.      Breath sounds: No stridor.   Abdominal:      General: There is no distension.      Palpations: Abdomen is soft.      Comments: Incisions c/d/I  Appropriately tender to palpation   Genitourinary:     Comments: Sepulveda catheter draining clear yellow urine  Musculoskeletal:         General: Normal range of motion.      Cervical back: Normal range of motion.   Skin:     General: Skin is warm and dry.   Neurological:      Mental Status: She is alert.   Psychiatric:         Behavior: Behavior normal.           Significant Labs:    BMP:  Recent Labs   Lab 04/11/24  0555 04/12/24  0247    138   K 4.2 3.7    109   CO2 24 20*   BUN 10 13  "  CREATININE 0.8 0.8   CALCIUM 7.8* 8.4*       CBC:   Recent Labs   Lab 04/11/24  0555 04/12/24  0247   WBC 5.01 6.34   HGB 11.6* 9.4*   HCT 34.5* 28.1*    227       Blood Culture: No results for input(s): "LABBLOO" in the last 168 hours.  Urine Culture:   Recent Labs   Lab 04/08/24  0840   LABURIN Multiple organisms isolated. None in predominance.  Repeat if  clinically necessary.     Urine Studies:   Recent Labs   Lab 04/08/24  0840   COLORU Colorless*   APPEARANCEUA Clear   PHUR 7.0   SPECGRAV 1.010   PROTEINUA Negative   GLUCUA Negative   KETONESU Negative   BILIRUBINUA Negative   OCCULTUA 1+*   NITRITE Negative   LEUKOCYTESUR 3+*   RBCUA 1   WBCUA 18*   BACTERIA Rare   SQUAMEPITHEL 16       Significant Imaging:  All pertinent imaging results/findings from the past 24 hours have been reviewed.                  Assessment/Plan:     Renal cell carcinoma of left kidney  - Continue regular diet  - Miralax, colace daily prn constipation  - Heparin for DVT ppx  - Maintain SCDs  - Continue prn pain and nausea control  - Continue to encourage ambulation  - Continue IS use  - d/c mIVF   Dispo: catheter out this AM, VT to follow. Likely discharge today.          VTE Risk Mitigation (From admission, onward)           Ordered     heparin (porcine) injection 5,000 Units  Every 8 hours         04/12/24 0788                    Saqib Chappell MD  Urology  [facility] - Int[facility] Care ([place])  "

## 2024-04-12 NOTE — PLAN OF CARE
Problem: Adult Inpatient Plan of Care  Goal: Plan of Care Review  Outcome: Ongoing, Progressing  Goal: Patient-Specific Goal (Individualized)  Outcome: Ongoing, Progressing  Goal: Absence of Hospital-Acquired Illness or Injury  Outcome: Ongoing, Progressing  Goal: Optimal Comfort and Wellbeing  Outcome: Ongoing, Progressing  Goal: Readiness for Transition of Care  Outcome: Ongoing, Progressing     Problem: Infection  Goal: Absence of Infection Signs and Symptoms  Outcome: Ongoing, Progressing     Problem: Skin Injury Risk Increased  Goal: Skin Health and Integrity  Outcome: Ongoing, Progressing     Problem: Bariatric Environmental Safety  Goal: Safety Maintained with Care  Outcome: Ongoing, Progressing

## 2024-04-12 NOTE — PROGRESS NOTES
Brief Progress Note    Full consult note in process. Seen and evaluated at bedside; doing well this AM, reports abdominal discomfort anticipated after surgery. Discussed with patient/family at bedside. Good appetite and tolerating diet.    PE:  HEENT: NC/AT  Cardiac: RRR, intact pulses  Pulm: CTAB  Abd: Tender near incision, otherwise soft, nondistended  Ext: No edema, normal ROM    Continue current antiepileptics; anticipate no changes to medicines for discharge. Note potential discharge today; appears stable from medicine perspective for DC. Please do not hesitate to contact me with any questions or concerns.    SAPNA Willingham MD  Department of Hospital Medicine   [facility]

## 2024-04-12 NOTE — HPI
Malorie Taylor is a 45 yo F with a history of L renal mass s/p robotic L partial nephrectomy on 4/11/24

## 2024-04-12 NOTE — SUBJECTIVE & OBJECTIVE
Past Medical History:   Diagnosis Date    Allergy     Learning disability     approximate age 8-10    Obesity 10/31/2015    Seizures     epilepsy, last seizure teenage years       Past Surgical History:   Procedure Laterality Date    BIOPSY, WITH CT GUIDANCE Left 1/18/2023    Procedure: RENAL MASS BIOPSY, WITH CT GUIDANCE;  Surgeon: Luis Eduardo Nassar MD;  Location: [facility] LAB;  Service: Radiology;  Laterality: Left;       Review of patient's allergies indicates:   Allergen Reactions    Phenobarbital      lethargy       No current facility-administered medications on file prior to encounter.     Current Outpatient Medications on File Prior to Encounter   Medication Sig    b complex vitamins capsule Take 1 capsule by mouth 3 (three) times a week.    carBAMazepine (TEGRETOL XR) 400 MG Tb12 Take 1 tablet (400 mg total) by mouth 2 (two) times daily.    cranberry 500 mg Cap Take by mouth Daily.    cranberry fruit concentrate (AZO CRANBERRY ORAL) Take by mouth.    ferrous sulfate (FEOSOL) 325 mg (65 mg iron) Tab tablet Take 325 mg by mouth daily with breakfast.    fish oil-omega-3 fatty acids 300-1,000 mg capsule Take 1 g by mouth 2 (two) times daily.    folic acid (FOLVITE) 400 MCG tablet Take 400 mcg by mouth once daily.    gabapentin (NEURONTIN) 600 MG tablet Take 2 tablets (1,200 mg total) by mouth 3 (three) times daily.    hydrocortisone (ANUSOL-HC) 2.5 % rectal cream Place rectally 2 (two) times daily.    multivitamin capsule Take 1 capsule by mouth once daily.    polyethylene glycol (GLYCOLAX) 17 gram PwPk Take 17 g by mouth once daily.    topiramate (TOPAMAX) 200 MG Tab Take 1 tablet (200 mg total) by mouth 2 (two) times daily.    vitamin D 1000 units Tab Take 185 mg by mouth once daily.    inulin (FIBER GUMMIES) 2 gram Chew Take by mouth.    VITAMIN E ACETATE ORAL Take by mouth.     Family History       Problem Relation (Age of Onset)    Alzheimer's disease Maternal Grandmother, Maternal Grandfather     Arrhythmia Father    Breast cancer Other, Other (68)    Cataracts Maternal Grandmother    Cervical cancer Paternal Grandmother    Cholecystitis Sister    Colonic polyp Mother    Coronary artery disease Maternal Aunt    Dementia Maternal Grandmother, Other    Diabetes Other, Other    Diverticulitis Other    Emphysema Paternal Grandmother    Epilepsy Other    Glaucoma Maternal Grandmother, Maternal Aunt, Other    Heart attack Other, Other, Other    Heart disease Father, Maternal Grandmother, Paternal Grandmother, Other, Other, Other, Other, Other    Hyperlipidemia Mother, Maternal Grandmother, Maternal Grandfather    Hypertension Mother, Maternal Grandmother, Maternal Grandfather    Insomnia Maternal Grandmother, Maternal Aunt    Kidney disease Other    Lung cancer Other    Macular degeneration Maternal Grandmother, Maternal Grandfather, Maternal Uncle, Other    Other Maternal Grandmother    Sleep apnea Maternal Uncle    Tuberculosis Other    Vision loss Maternal Grandfather          Tobacco Use    Smoking status: Never    Smokeless tobacco: Never   Substance and Sexual Activity    Alcohol use: Never    Drug use: Never    Sexual activity: Never     Review of Systems   Constitutional:  Negative for chills and diaphoresis.   Respiratory:  Negative for cough, shortness of breath and wheezing.    Cardiovascular:  Negative for chest pain and palpitations.   Gastrointestinal:  Negative for abdominal pain, constipation, diarrhea, nausea and vomiting.   Genitourinary:  Negative for dysuria, flank pain, frequency, hematuria and urgency.   Musculoskeletal:  Negative for arthralgias, back pain, myalgias, neck pain and neck stiffness.   Neurological:  Negative for dizziness, syncope, weakness, light-headedness, numbness and headaches.   Psychiatric/Behavioral:  Negative for agitation and confusion.      Objective:     Vital Signs (Most Recent):  Temp: 98.6 °F (37 °C) (04/12/24 0705)  Pulse: 94 (04/12/24 0800)  Resp: 16 (04/12/24  0827)  BP: (!) 117/55 (04/12/24 0800)  SpO2: 98 % (04/12/24 0800) Vital Signs (24h Range):  Temp:  [94.7 °F (34.8 °C)-98.6 °F (37 °C)] 98.6 °F (37 °C)  Pulse:  [63-96] 94  Resp:  [14-21] 16  SpO2:  [95 %-100 %] 98 %  BP: (107-131)/(53-74) 117/55  Arterial Line BP: (120-121)/(69-75) 120/75     Weight: 116 kg (255 lb 11.7 oz)  Body mass index is 40.05 kg/m².     Physical Exam  Vitals and nursing note reviewed.   Constitutional:       General: She is not in acute distress.     Appearance: She is well-developed. She is obese. She is not ill-appearing, toxic-appearing or diaphoretic.   HENT:      Head: Normocephalic and atraumatic.   Eyes:      General: No scleral icterus.        Right eye: No discharge.         Left eye: No discharge.      Conjunctiva/sclera: Conjunctivae normal.   Neck:      Trachea: No tracheal deviation.   Cardiovascular:      Rate and Rhythm: Normal rate and regular rhythm.      Heart sounds: Normal heart sounds. No murmur heard.     No gallop.   Pulmonary:      Effort: Pulmonary effort is normal. No respiratory distress.      Breath sounds: Normal breath sounds. No stridor. No wheezing or rales.   Abdominal:      General: Bowel sounds are normal. There is no distension.      Palpations: Abdomen is soft. There is no mass.      Tenderness: There is no abdominal tenderness. There is no guarding.   Musculoskeletal:         General: No deformity. Normal range of motion.      Cervical back: Normal range of motion and neck supple.   Skin:     General: Skin is warm and dry.      Coloration: Skin is not pale.      Findings: No erythema or rash.   Neurological:      General: No focal deficit present.      Mental Status: She is alert and oriented to person, place, and time.      Cranial Nerves: No cranial nerve deficit.      Motor: No abnormal muscle tone.   Psychiatric:         Mood and Affect: Mood normal.         Behavior: Behavior normal.         Thought Content: Thought content normal.         Judgment:  "Judgment normal.          Significant Labs: All pertinent labs within the past 24 hours have been reviewed.  BMP:   Recent Labs   Lab 04/12/24  0247         K 3.7      CO2 20*   BUN 13   CREATININE 0.8   CALCIUM 8.4*     CBC:   Recent Labs   Lab 04/11/24  0555 04/12/24  0247   WBC 5.01 6.34   HGB 11.6* 9.4*   HCT 34.5* 28.1*    227     CMP:   Recent Labs   Lab 04/11/24  0555 04/12/24  0247    138   K 4.2 3.7    109   CO2 24 20*   GLU 88 105   BUN 10 13   CREATININE 0.8 0.8   CALCIUM 7.8* 8.4*   ANIONGAP 9 9     Urine Culture: No results for input(s): "LABURIN" in the last 48 hours.  Urine Studies: No results for input(s): "COLORU", "APPEARANCEUA", "PHUR", "SPECGRAV", "PROTEINUA", "GLUCUA", "KETONESU", "BILIRUBINUA", "OCCULTUA", "NITRITE", "UROBILINOGEN", "LEUKOCYTESUR", "RBCUA", "WBCUA", "BACTERIA", "SQUAMEPITHEL", "HYALINECASTS" in the last 48 hours.    Invalid input(s): "WRIGHTSUR"    Significant Imaging: I have reviewed all pertinent imaging results/findings within the past 24 hours.     "

## 2024-04-12 NOTE — ASSESSMENT & PLAN NOTE
- Continue regular diet  - Miralax, colace daily prn constipation  - Heparin for DVT ppx  - Maintain SCDs  - Continue prn pain and nausea control  - Continue to encourage ambulation  - Continue IS use  - d/c mIVF   Dispo: catheter out this AM, VT to follow. Likely discharge today.

## 2024-04-12 NOTE — DISCHARGE INSTRUCTIONS
What to expect with your Partial Nephrectomy  [facility]  Symptoms you may experience immediately post-op:  Bloating and/or shoulder pain if you had a laparascopic procedure- when we do this operation, we fill up your abdominal cavity with gas to better help us visualize the organs and allow our instruments to fit. After the surgery, not all the air can be removed and your body will eventually absorb this small amount of air. However this can make you feel bloated. In addition, when you sit up, the air can sit right under a muscle (the diaphragm) which has connecting nerves to the shoulders, which could explain why you have shoulder pain.  Do not expect necessarily to have gas or to have a bowel movement - this goes along with the bloating, you may feel like you want to pass gas or have a bowel movement but you cant. This is normal and you will feel like this for a couple days. There are no pills to help with this. Small walks throughout the day should help with this.  Pain - your pain should be able to be controlled with medicines by mouth that we prescribe. It is important for you to tell us if you are on any pain medications at home before the surgery as you may need stronger pain meds while in the hospital.  You can go home when:  Pain is controlled with medicines by mouth  You are able to walk without difficulty  You are tolerating a regulating diet  You are voiding. If you cannot void we may have to replace a catheter and you will follow up 3-5 days after you leave to have a voiding trial. The nurses will teach you how to care for your catheter.  When you go home:  Blood pressure  Your blood pressure must be well controlled (<140 systolic blood pressure), if youre blood pressure is not controlled, there is an increased risk of bleeding.  Activity  Continue to walk - small walks throughout the day are better than one long walk.   Do not lift anything greater than 8 pounds for 6 weeks - we want your  abdominal wall muscles to heal.  Bowel Movements - Do not strain to have a bowel movement - the pain medicines will make you constipated. That is why we also ask you to take colace 2-3 x per day to help keep your bowels regular. If you are still having trouble, then you can also add Miralax once a day. Do not take any stool softeners if you are having diarrhea.  Drain - If you have a drain (not your catheter, but a separate drain) then record the output and bring it with you to your next appointment  Smoking - If you smoke, we encourage you to STOP. Smoking interferes with the healing process and your prolong your healing with continued smoking.  Driving - Do not drive while you are on pain meds or with your catheter in place.  Bathing - If you do not have a drain, you can shower 48 hours after your surgery. If you do have a drain, sponge bathe only until the drain is out.  Dressing - you can remove the dressings if there is no drainage or change them as needed if there is. The skin glue will peel off over the next week or two.  Restarting medicines -especially blood thinners (Aspirin, Plavix, Coumadin), Fish Oil. Discuss this with your physician prior to discharge.  When to return to the ER  Fever - If you have a fever >101.5. This could be due to a number of reasons such as infection of the urine or incision. If your catheter has been removed, you could possibly have a leak. It would be best to come to the ER so they can better evaluate you.  Severe pain - pain is expected, but severe or new onset of pain is not normal.   Inability to tolerate food or liquid with nausea and vomiting - it would be best to go to the ER for them to better evaluate you.

## 2024-04-12 NOTE — SUBJECTIVE & OBJECTIVE
"Interval History: NAEON. Pain well controlled, tolerating diet. No nausea, vomiting. No flatus, BM. Ambulating normally.    Objective:     Temp:  [94.7 °F (34.8 °C)-98.5 °F (36.9 °C)] 98.5 °F (36.9 °C)  Pulse:  [63-96] 79  Resp:  [14-21] 14  SpO2:  [95 %-100 %] 95 %  BP: (107-131)/(53-74) 107/54  Arterial Line BP: (120-121)/(69-75) 120/75     Body mass index is 40.05 kg/m².           Drains       Drain  Duration                  Urethral Catheter 04/11/24 0843 Non-latex 20 Fr. <1 day                     Physical Exam  Vitals reviewed.   Constitutional:       General: She is not in acute distress.     Appearance: She is well-developed. She is not diaphoretic.   HENT:      Head: Normocephalic and atraumatic.   Eyes:      General: No scleral icterus.  Cardiovascular:      Rate and Rhythm: Normal rate.   Pulmonary:      Effort: Pulmonary effort is normal. No respiratory distress.      Breath sounds: No stridor.   Abdominal:      General: There is no distension.      Palpations: Abdomen is soft.      Comments: Incisions c/d/I  Appropriately tender to palpation   Genitourinary:     Comments: Sepulveda catheter draining clear yellow urine  Musculoskeletal:         General: Normal range of motion.      Cervical back: Normal range of motion.   Skin:     General: Skin is warm and dry.   Neurological:      Mental Status: She is alert.   Psychiatric:         Behavior: Behavior normal.           Significant Labs:    BMP:  Recent Labs   Lab 04/11/24  0555 04/12/24  0247    138   K 4.2 3.7    109   CO2 24 20*   BUN 10 13   CREATININE 0.8 0.8   CALCIUM 7.8* 8.4*       CBC:   Recent Labs   Lab 04/11/24  0555 04/12/24  0247   WBC 5.01 6.34   HGB 11.6* 9.4*   HCT 34.5* 28.1*    227       Blood Culture: No results for input(s): "LABBLOO" in the last 168 hours.  Urine Culture:   Recent Labs   Lab 04/08/24  0840   LABURIN Multiple organisms isolated. None in predominance.  Repeat if  clinically necessary.     Urine " Studies:   Recent Labs   Lab 04/08/24  0840   COLORU Colorless*   APPEARANCEUA Clear   PHUR 7.0   SPECGRAV 1.010   PROTEINUA Negative   GLUCUA Negative   KETONESU Negative   BILIRUBINUA Negative   OCCULTUA 1+*   NITRITE Negative   LEUKOCYTESUR 3+*   RBCUA 1   WBCUA 18*   BACTERIA Rare   SQUAMEPITHEL 16       Significant Imaging:  All pertinent imaging results/findings from the past 24 hours have been reviewed.

## 2024-04-12 NOTE — PLAN OF CARE
Patient's preferred pharmacy is bedside. Patient's family will provide transportation home up discharge.     [facility] ([place])  Discharge Final Note    Primary Care Provider: Martita Rico MD    Expected Discharge Date: 4/12/2024    Final Discharge Note (most recent)       Final Note - 04/12/24 1210          Final Note    Assessment Type Final Discharge Note (P)      Anticipated Discharge Disposition Home or Self Care (P)      Hospital Resources/Appts/Education Provided Provided patient/caregiver with written discharge plan information;Appointments scheduled and added to AVS (P)         Post-Acute Status    Post-Acute Authorization Other (P)      Other Status No Post-Acute Service Needs (P)      Discharge Delays None known at this time (P)                    Contact Info       Maxx Thomas MD   Specialty: Urology   Relationship: Consulting Physician    [address] [place] [id number]   Phone: [phone]       Next Steps: Follow up in 2 week(s)

## 2024-04-12 NOTE — CONSULTS
[facility] Intensive Care [facility] Medicine  Consult Note    Patient Name: Malorie Taylor  MRN: [id number]  Admission Date: 4/11/2024  Hospital Length of Stay: 1 days  Attending Physician: Maxx Thomas MD   Primary Care Provider: Martita Rico MD           Patient information was obtained from patient, past medical records, and ER records.     Consults  Subjective:     Principal Problem: Left renal mass    Chief Complaint: No chief complaint on file.       HPI: Ms. Taylor is a 44/F with PMH seizure disorder, tuberous sclerosis, RCC of L kidney who presented to [facility] 04/11 for planned robotic partial nephrectomy with Dr. Thomas. She is consulted on for medical co-management in the post-operative period. At time of exam she has no questions or concerns.     Past Medical History:   Diagnosis Date    Allergy     Learning disability     approximate age 8-10    Obesity 10/31/2015    Seizures     epilepsy, last seizure teenage years       Past Surgical History:   Procedure Laterality Date    BIOPSY, WITH CT GUIDANCE Left 1/18/2023    Procedure: RENAL MASS BIOPSY, WITH CT GUIDANCE;  Surgeon: Luis Eduardo Nassar MD;  Location: [facility] CATH LAB;  Service: Radiology;  Laterality: Left;       Review of patient's allergies indicates:   Allergen Reactions    Phenobarbital      lethargy       No current facility-administered medications on file prior to encounter.     Current Outpatient Medications on File Prior to Encounter   Medication Sig    b complex vitamins capsule Take 1 capsule by mouth 3 (three) times a week.    carBAMazepine (TEGRETOL XR) 400 MG Tb12 Take 1 tablet (400 mg total) by mouth 2 (two) times daily.    cranberry 500 mg Cap Take by mouth Daily.    cranberry fruit concentrate (AZO CRANBERRY ORAL) Take by mouth.    ferrous sulfate (FEOSOL) 325 mg (65 mg iron) Tab tablet Take 325 mg by mouth daily with breakfast.    fish oil-omega-3 fatty acids 300-1,000 mg capsule Take 1 g by mouth 2 (two) times  daily.    folic acid (FOLVITE) 400 MCG tablet Take 400 mcg by mouth once daily.    gabapentin (NEURONTIN) 600 MG tablet Take 2 tablets (1,200 mg total) by mouth 3 (three) times daily.    hydrocortisone (ANUSOL-HC) 2.5 % rectal cream Place rectally 2 (two) times daily.    multivitamin capsule Take 1 capsule by mouth once daily.    polyethylene glycol (GLYCOLAX) 17 gram PwPk Take 17 g by mouth once daily.    topiramate (TOPAMAX) 200 MG Tab Take 1 tablet (200 mg total) by mouth 2 (two) times daily.    vitamin D 1000 units Tab Take 185 mg by mouth once daily.    inulin (FIBER GUMMIES) 2 gram Chew Take by mouth.    VITAMIN E ACETATE ORAL Take by mouth.     Family History       Problem Relation (Age of Onset)    Alzheimer's disease Maternal Grandmother, Maternal Grandfather    Arrhythmia Father    Breast cancer Other, Other (68)    Cataracts Maternal Grandmother    Cervical cancer Paternal Grandmother    Cholecystitis Sister    Colonic polyp Mother    Coronary artery disease Maternal Aunt    Dementia Maternal Grandmother, Other    Diabetes Other, Other    Diverticulitis Other    Emphysema Paternal Grandmother    Epilepsy Other    Glaucoma Maternal Grandmother, Maternal Aunt, Other    Heart attack Other, Other, Other    Heart disease Father, Maternal Grandmother, Paternal Grandmother, Other, Other, Other, Other, Other    Hyperlipidemia Mother, Maternal Grandmother, Maternal Grandfather    Hypertension Mother, Maternal Grandmother, Maternal Grandfather    Insomnia Maternal Grandmother, Maternal Aunt    Kidney disease Other    Lung cancer Other    Macular degeneration Maternal Grandmother, Maternal Grandfather, Maternal Uncle, Other    Other Maternal Grandmother    Sleep apnea Maternal Uncle    Tuberculosis Other    Vision loss Maternal Grandfather          Tobacco Use    Smoking status: Never    Smokeless tobacco: Never   Substance and Sexual Activity    Alcohol use: Never    Drug use: Never    Sexual activity: Never      Review of Systems   Constitutional:  Negative for chills and diaphoresis.   Respiratory:  Negative for cough, shortness of breath and wheezing.    Cardiovascular:  Negative for chest pain and palpitations.   Gastrointestinal:  Negative for abdominal pain, constipation, diarrhea, nausea and vomiting.   Genitourinary:  Negative for dysuria, flank pain, frequency, hematuria and urgency.   Musculoskeletal:  Negative for arthralgias, back pain, myalgias, neck pain and neck stiffness.   Neurological:  Negative for dizziness, syncope, weakness, light-headedness, numbness and headaches.   Psychiatric/Behavioral:  Negative for agitation and confusion.      Objective:     Vital Signs (Most Recent):  Temp: 98.6 °F (37 °C) (04/12/24 0705)  Pulse: 94 (04/12/24 0800)  Resp: 16 (04/12/24 0827)  BP: (!) 117/55 (04/12/24 0800)  SpO2: 98 % (04/12/24 0800) Vital Signs (24h Range):  Temp:  [94.7 °F (34.8 °C)-98.6 °F (37 °C)] 98.6 °F (37 °C)  Pulse:  [63-96] 94  Resp:  [14-21] 16  SpO2:  [95 %-100 %] 98 %  BP: (107-131)/(53-74) 117/55  Arterial Line BP: (120-121)/(69-75) 120/75     Weight: 116 kg (255 lb 11.7 oz)  Body mass index is 40.05 kg/m².     Physical Exam  Vitals and nursing note reviewed.   Constitutional:       General: She is not in acute distress.     Appearance: She is well-developed. She is obese. She is not ill-appearing, toxic-appearing or diaphoretic.   HENT:      Head: Normocephalic and atraumatic.   Eyes:      General: No scleral icterus.        Right eye: No discharge.         Left eye: No discharge.      Conjunctiva/sclera: Conjunctivae normal.   Neck:      Trachea: No tracheal deviation.   Cardiovascular:      Rate and Rhythm: Normal rate and regular rhythm.      Heart sounds: Normal heart sounds. No murmur heard.     No gallop.   Pulmonary:      Effort: Pulmonary effort is normal. No respiratory distress.      Breath sounds: Normal breath sounds. No stridor. No wheezing or rales.   Abdominal:      General:  "Bowel sounds are normal. There is no distension.      Palpations: Abdomen is soft. There is no mass.      Tenderness: There is no abdominal tenderness. There is no guarding.   Musculoskeletal:         General: No deformity. Normal range of motion.      Cervical back: Normal range of motion and neck supple.   Skin:     General: Skin is warm and dry.      Coloration: Skin is not pale.      Findings: No erythema or rash.   Neurological:      General: No focal deficit present.      Mental Status: She is alert and oriented to person, place, and time.      Cranial Nerves: No cranial nerve deficit.      Motor: No abnormal muscle tone.   Psychiatric:         Mood and Affect: Mood normal.         Behavior: Behavior normal.         Thought Content: Thought content normal.         Judgment: Judgment normal.          Significant Labs: All pertinent labs within the past 24 hours have been reviewed.  BMP:   Recent Labs   Lab 04/12/24  0247         K 3.7      CO2 20*   BUN 13   CREATININE 0.8   CALCIUM 8.4*     CBC:   Recent Labs   Lab 04/11/24  0555 04/12/24  0247   WBC 5.01 6.34   HGB 11.6* 9.4*   HCT 34.5* 28.1*    227     CMP:   Recent Labs   Lab 04/11/24  0555 04/12/24  0247    138   K 4.2 3.7    109   CO2 24 20*   GLU 88 105   BUN 10 13   CREATININE 0.8 0.8   CALCIUM 7.8* 8.4*   ANIONGAP 9 9     Urine Culture: No results for input(s): "LABURIN" in the last 48 hours.  Urine Studies: No results for input(s): "COLORU", "APPEARANCEUA", "PHUR", "SPECGRAV", "PROTEINUA", "GLUCUA", "KETONESU", "BILIRUBINUA", "OCCULTUA", "NITRITE", "UROBILINOGEN", "LEUKOCYTESUR", "RBCUA", "WBCUA", "BACTERIA", "SQUAMEPITHEL", "HYALINECASTS" in the last 48 hours.    Invalid input(s): "WRIGHTSUR"    Significant Imaging: I have reviewed all pertinent imaging results/findings within the past 24 hours.     Assessment/Plan:     * Left renal mass  RCC of left kidney  - Primary management, pain control as per " Urology.    Seizure disorder  - Continue carbamazepine 400mg PO BID, gabapentin 1200mg PO TID, topiramate 200mg PO BID.      VTE Risk Mitigation (From admission, onward)           Ordered     heparin (porcine) injection 5,000 Units  Every 8 hours         04/12/24 9894                        Thank you for your consult. We will follow-up with the patient. Please contact us if you have any additional questions.    Bhavya Prado PA-C  Department of Hospital Medicine   [facility] - Intensive Care ([place])

## 2024-04-12 NOTE — PLAN OF CARE
Medicare Message     Important Message from Medicare regarding Discharge Appeal Rights Given to patient/caregiver; Explained to patient/caregiver; Signed/date by patient/caregiver   Date IMM was signed 4/12/2024   Time IMM was signed 0943

## 2024-04-12 NOTE — DISCHARGE SUMMARY
[facility])  Urology  Discharge Summary      Patient Name: Malorie Taylor  MRN: [id number]  Admission Date: 4/11/2024  Hospital Length of Stay: 1 days  Discharge Date and Time:  04/12/2024 11:19 AM  Attending Physician: Maxx Thomas MD   Discharging Provider: Saqib Chappell MD  Primary Care Physician: Martita Rico MD    HPI:   Malorie Taylor is a 43 yo F with a history of L renal mass s/p robotic L partial nephrectomy on 4/11/24     Procedure(s) (LRB):  ROBOTIC NEPHRECTOMY, PARTIAL (Left)     Indwelling Lines/Drains at time of discharge:   Lines/Drains/Airways       None                   Hospital Course (synopsis of major diagnoses, care, treatment, and services provided during the course of the hospital stay):    Patient was admitted to the hospital for procedures as described above, please see operative note for full details. Patient tolerated the procedure well, and was taken to PACU postoperatively for observation during their continued recovery from anesthesia. After an appropriate duration of observation, patient was deemed stable for transfer to the floor to continue their recovery. The postoperative course was largely normal. Patient was able to void and ambulate normally. Patient was able to tolerate prescribed diet. Nausea and pain were controlled with oral medications. Patient was deemed stable for discharge on POD 1 and was discharged with appropriate medications, instructions, and follow up.    Medications and instructions as below.  For more thorough information, please refer to the hospital record and operative report.    Consults:   Consults (From admission, onward)          Status Ordering Provider     Inpatient consult to Hospital Medicine  Once        Provider:  Bhavya Prado PA-C Acknowledged BURNS, ROBERT J            Significant Diagnostic Studies:     Pending Diagnostic Studies:       Procedure Component Value Units Date/Time    Specimen to Pathology,  Surgery Urology [[id number]] Collected: 04/11/24 1218    Order Status: Sent Lab Status: In process Updated: 04/11/24 1500    Specimen: Tissue             Final Active Diagnoses:    Diagnosis Date Noted POA    PRINCIPAL PROBLEM:  Left renal mass [N28.89] 04/11/2024 Yes    Renal cell carcinoma of left kidney [C64.2] 12/08/2022 Yes    Seizure disorder [G40.909] 08/10/2012 Yes     Chronic      Problems Resolved During this Admission:       Discharged Condition: good    Disposition: home    Follow Up:   Follow-up Information       Maxx Thomas MD Follow up in 2 week(s).    Specialty: Urology  Contact information:  [address] [address]  [phone]                           Patient Instructions:      Notify your health care provider if you experience any of the following:  temperature >100.4     Notify your health care provider if you experience any of the following:  persistent nausea and vomiting or diarrhea     Notify your health care provider if you experience any of the following:  severe uncontrolled pain     Notify your health care provider if you experience any of the following:  difficulty breathing or increased cough     Notify your health care provider if you experience any of the following:  severe persistent headache     Notify your health care provider if you experience any of the following:  persistent dizziness, light-headedness, or visual disturbances     Notify your health care provider if you experience any of the following:  increased confusion or weakness     Medications:  Reconciled Home Medications:      Medication List        START taking these medications      acetaminophen 500 MG tablet  Commonly known as: TYLENOL  Take 2 tablets (1,000 mg total) by mouth every 8 (eight) hours. for 7 days     ibuprofen 800 MG tablet  Commonly known as: ADVIL,MOTRIN  Take 1 tablet (800 mg total) by mouth 3 (three) times daily. for 7 days     oxyCODONE 5 MG immediate release  tablet  Commonly known as: ROXICODONE  Take 1 tablet (5 mg total) by mouth every 4 (four) hours as needed for Pain.            CHANGE how you take these medications      * polyethylene glycol 17 gram Pwpk  Commonly known as: GLYCOLAX  Take 17 g by mouth once daily.  What changed: Another medication with the same name was added. Make sure you understand how and when to take each.     * polyethylene glycol 17 gram/dose powder  Commonly known as: GLYCOLAX  Take 17 g by mouth once daily.  What changed: You were already taking a medication with the same name, and this prescription was added. Make sure you understand how and when to take each.           * This list has 2 medication(s) that are the same as other medications prescribed for you. Read the directions carefully, and ask your doctor or other care provider to review them with you.                CONTINUE taking these medications      AZO CRANBERRY ORAL  Take by mouth.     b complex vitamins capsule  Take 1 capsule by mouth 3 (three) times a week.     carBAMazepine 400 MG Tb12  Commonly known as: TEGRETOL XR  Take 1 tablet (400 mg total) by mouth 2 (two) times daily.     cranberry 500 mg Cap  Take by mouth Daily.     ferrous sulfate 325 mg (65 mg iron) Tab tablet  Commonly known as: FEOSOL  Take 325 mg by mouth daily with breakfast.     fish oil-omega-3 fatty acids 300-1,000 mg capsule  Take 1 g by mouth 2 (two) times daily.     folic acid 400 MCG tablet  Commonly known as: FOLVITE  Take 400 mcg by mouth once daily.     gabapentin 600 MG tablet  Commonly known as: NEURONTIN  Take 2 tablets (1,200 mg total) by mouth 3 (three) times daily.     hydrocortisone 2.5 % rectal cream  Commonly known as: ANUSOL-HC  Place rectally 2 (two) times daily.     multivitamin capsule  Take 1 capsule by mouth once daily.     topiramate 200 MG Tab  Commonly known as: TOPAMAX  Take 1 tablet (200 mg total) by mouth 2 (two) times daily.     vitamin D 1000 units Tab  Commonly known as:  VITAMIN D3  Take 185 mg by mouth once daily.            ASK your doctor about these medications      FIBER GUMMIES 2 gram Chew  Generic drug: inulin  Take by mouth.     VITAMIN E ACETATE ORAL  Take by mouth.              Time spent on the discharge of patient: 15 minutes    Saqib Chappell MD  Urology  [facility] ([place])

## 2024-04-15 ENCOUNTER — PATIENT OUTREACH (OUTPATIENT)
Dept: ADMINISTRATIVE | Facility: CLINIC | Age: 44
End: 2024-04-15
Payer: MEDICARE

## 2024-04-15 NOTE — PROGRESS NOTES
C3 nurse spoke with Malorie Taylor's mother for a TCC post hospital discharge follow up call. The patient does not have a scheduled HOSFU appointment with Martita Rico MD within 5-7 days post hospital discharge date 4/12/24. Mother declined appt .  Message sent to PCP staff requesting they contact patient and schedule follow up appointment.

## 2024-04-26 NOTE — PROGRESS NOTES
Dr Thomas called Ms. Taylor with path results  Mass was enucleated due to her history of tuberous sclerosis and was grossly completely excised.    Will present care at  oncololgy conference 4/30/24

## 2024-04-29 ENCOUNTER — OFFICE VISIT (OUTPATIENT)
Dept: UROLOGY | Facility: CLINIC | Age: 44
End: 2024-04-29
Payer: MEDICARE

## 2024-04-29 VITALS
DIASTOLIC BLOOD PRESSURE: 58 MMHG | SYSTOLIC BLOOD PRESSURE: 116 MMHG | HEIGHT: 67 IN | WEIGHT: 255.75 LBS | BODY MASS INDEX: 40.14 KG/M2 | HEART RATE: 68 BPM | OXYGEN SATURATION: 99 % | RESPIRATION RATE: 18 BRPM

## 2024-04-29 DIAGNOSIS — C64.2 RENAL CELL CARCINOMA OF LEFT KIDNEY: ICD-10-CM

## 2024-04-29 DIAGNOSIS — N39.0 URINARY TRACT INFECTION WITHOUT HEMATURIA, SITE UNSPECIFIED: Primary | ICD-10-CM

## 2024-04-29 LAB
BILIRUB SERPL-MCNC: NORMAL MG/DL
BLOOD URINE, POC: NORMAL
CLARITY, POC UA: CLEAR
COLOR, POC UA: YELLOW
GLUCOSE UR QL STRIP: NORMAL
KETONES UR QL STRIP: NORMAL
LEUKOCYTE ESTERASE URINE, POC: NORMAL
NITRITE, POC UA: NORMAL
PH, POC UA: 5
PROTEIN, POC: NORMAL
SPECIFIC GRAVITY, POC UA: 1.02
UROBILINOGEN, POC UA: NORMAL

## 2024-04-29 PROCEDURE — 3074F SYST BP LT 130 MM HG: CPT | Mod: CPTII,S$GLB,, | Performed by: NURSE PRACTITIONER

## 2024-04-29 PROCEDURE — 3078F DIAST BP <80 MM HG: CPT | Mod: CPTII,S$GLB,, | Performed by: NURSE PRACTITIONER

## 2024-04-29 PROCEDURE — 1159F MED LIST DOCD IN RCRD: CPT | Mod: CPTII,S$GLB,, | Performed by: NURSE PRACTITIONER

## 2024-04-29 PROCEDURE — 99024 POSTOP FOLLOW-UP VISIT: CPT | Mod: S$GLB,,, | Performed by: NURSE PRACTITIONER

## 2024-04-29 PROCEDURE — 81002 URINALYSIS NONAUTO W/O SCOPE: CPT | Mod: S$GLB,,, | Performed by: NURSE PRACTITIONER

## 2024-04-29 PROCEDURE — 1160F RVW MEDS BY RX/DR IN RCRD: CPT | Mod: CPTII,S$GLB,, | Performed by: NURSE PRACTITIONER

## 2024-04-29 NOTE — PROGRESS NOTES
"Subjective:      Malorie Taylor is a 44 y.o. female who returns today for wound check. Established patient of Dr. Thomas' and new to me today.    The patient is s/p robotic L partial nephrectomy on 4/11/24. Discharged on POD #1.    She presents today for wound check. Doing great. Minimal pain. Voiding well. Denies gross hematuria and dysuria. Denies constipation.     The following portions of the patient's history were reviewed and updated as appropriate: allergies, current medications, past family history, past medical history, past social history, past surgical history and problem list.    Review of Systems  Constitutional: no fever or chills  ENT: no nasal congestion or sore throat  Respiratory: no cough or shortness of breath  Cardiovascular: no chest pain or palpitations  Gastrointestinal: no nausea or vomiting, tolerating diet  Genitourinary: as per HPI  Hematologic/Lymphatic: no easy bruising or lymphadenopathy  Musculoskeletal: no arthralgias or myalgias  Neurological: no seizures or tremors  Behavioral/Psych: no auditory or visual hallucinations     Objective:   Vital Signs:BP (!) 116/58 (BP Location: Left arm, Patient Position: Sitting, BP Method: Medium (Automatic))   Pulse 68   Resp 18   Ht 5' 7" (1.702 m)   Wt 116 kg (255 lb 11.7 oz)   LMP 04/23/2024 (Approximate)   SpO2 99%   BMI 40.05 kg/m²     Physical Exam   General: alert and oriented, no acute distress  Head: normocephalic, atraumatic  Neck: supple, normal ROM  Respiratory: Symmetric expansion, non-labored breathing  Cardiovascular: regular rate and rhythm  Abdomen: soft, non tender, non distended; incisions CDI dressed with dermabond healing well   Pelvic: deferred  Skin: normal coloration and turgor, no rashes, no suspicious skin lesions noted  Neuro: alert and oriented x3, no gross deficits  Psych: normal judgment and insight, normal mood/affect, and non-anxious    Lab Review   Urinalysis demonstrates negative for all " components  Lab Results   Component Value Date    WBC 6.34 04/12/2024    HGB 9.4 (L) 04/12/2024    HCT 28.1 (L) 04/12/2024     (H) 04/12/2024     04/12/2024     Lab Results   Component Value Date    CREATININE 0.8 04/12/2024    BUN 13 04/12/2024       Imaging   None      Final Pathologic Diagnosis 1. Submitted as &quot;fat over tumor mass&quot;, biopsy:  Benign fibroadipose tissue and blood vessels.  Negative for malignancy.      2. Left kidney, left renal mass, partial nephrectomy:  Renal cell carcinoma with clear cell features and low-grade oncocytic tumor, best characterized as a collision tumor with both low-grade oncocytic tumor and renal cell carcinoma components, see note.    Tumor extends to black inked surgical margin.  See comment and [facility] consultative opinion attached.    Note:  Additionally, molecular proliferation of the clear cell proliferation will be completed and reported separately in a supplemental by [facility] to reach a more definitive classification.        Comment:  Immunohistochemical stains are completed on the left renal mass and show following:    AMACR:  Positive in oncocytic portion tumor, negative in the clear cell papillary renal cell tumor portion.  CD10:  Negative in the oncocytic portion of the tumor; positive in the clear cell papillary renal tumor portion.  :  Positive staining in the oncocytic portion of the tumor; negative in the clear cell papillary renal cell tumor portion.  Cytokeratin 7:  Positive in both the oncocytic and clear cell papillary renal cell tumor portions.  Colloidal iron:  Negative within both tumor morphologies.    All stains have satisfactory positive negative controls.    Synoptic Report:  Specimen:  Procedure:  Partial nephrectomy  Specimen laterality:  Left    Tumor:  Tumor focality:  Unifocal    Tumor size:  Greatest dimension in cm:  5.8 cm    Histologic type:  Collision tumor with two separate components including renal cell  carcinoma with clear cell features and low-grade oncocytic tumor.    Histologic grade:  Renal cell component:  G2    Tumor extent:  Limited to the kidney    Sarcomatoid features:  Not identified  Rhabdoid features:  Not identified  Tumor necrosis:  Not identified    Margins:  Margin status:  Both low-grade oncocytic tumor and renal cell carcinoma with clear cell features components extend to the inked parenchymal resection margin.    Regional lymph nodes:  Not applicable (no regional lymph nodes submitted or found).    Distant metastasis:  Not applicable.      Pathologic stage classification:  TNM descriptors:  Not applicable    Primary tumor:       pT1b:  Tumor greater than 4 cm but less than or equal to 7 cm in greatest dimension; limited to the kidney.  Regional lymph nodes:       pNX:  Cannot be assessed  Distant metastasis:       pMX:  Cannot be assessed.    Additional findings:  Insufficient nonneoplastic kidney tissue for evaluation.      Tumor Blocks for possible Future Studies:  2A, 2C, 2E.    Note:  See [facility] diagnostic consultative opinion below.      [facility] FINAL DIAGNOSIS  Interpretation MCR  Kidney, left, nephrectomy (UBS-24?1471; 04/11/2024): Renal cell carcinoma with clear cell features and low-grade oncocytic tumor, see comment.    COMMENT  I have reviewed representative slides of a nephrectomy specimen taken from the above-mentioned patient, a 44-year-old female. Per  submitted gross report, the patient has a 5.8 x 4.7 x 3.0 cm yellow-brown mass. I have reviewed this case because of my interest in  genitourinary pathology.    Histologic sections reveal two distinct but abutting epithelial proliferations. One population consists of a nodule of optically clear cells with basal nuclei arranged in acinar/tubular configurations. Few fibromuscular septa pass through the clear cell  mass. The second population surrounds the first and is characterized by oncocytic cells with rounded nuclei arranged in  tightly packed nests and occasionally forming cords in edematous stroma. The submitted immunostains are reviewed (block 2A). The clear   cell component expresses CK7 and CD10 while negative for  and AMACR. The oncocytic component marks with CK7 and AMACR while negative for  and CD10.    In my opinion, this lesion is best characterized as a collision tumor with a low-grade oncocytic tumor (LOT) component surrounding a  nodule of renal cell carcinoma with clear cell features. The latter has morphologic features suggestive of renal cell carcinoma with  fibromyomatous stroma which may exhibit ELOC (formerly TCEB1 ), TSC1 , TSC2 , or mTOR alterations. Similarly, LOTs have been  demonstrated to exhibit mTOR alterations as well. Molecular proliferation of the clear cell proliferation will be completed and reported  separately as an addendum to reach a more definitive diagnosis. Given the patient's young age and the presence of possibly two mTOR- altered tumors, germline testing may be considered.    Thank you for sharing this case with me. If you have any questions, please do not hesitate to reach me by calling [facility] [employer] at [phone].    Report electronically signed by  Marlene Sampson M.D       Assessment:     1. Renal cell carcinoma of left kidney     Plan:   Diagnoses and all orders for this visit:    -     POCT URINE DIPSTICK WITHOUT MICROSCOPE    Renal cell carcinoma of left kidney  -     Comprehensive Metabolic Panel; Future  -     CBC Auto Differential; Future  -     US Abdomen Complete; Future    Plan:  --Doing great!  --Dr. Thomas will present her case at tumor board this week  --Follow up will be determined by Dr. Thomas

## 2024-04-30 ENCOUNTER — TUMOR BOARD CONFERENCE (OUTPATIENT)
Dept: UROLOGY | Facility: CLINIC | Age: 44
End: 2024-04-30
Payer: MEDICARE

## 2024-04-30 NOTE — PROGRESS NOTES
[facility]      GENITOURINARY MULTIDISCIPLINARY TUMOR BOARD  PATIENT REVIEW FORM     CLINIC #: [id number]  DATE: 04/30/2024    TUMOR SITE:   kidney    ATTENDING:   Maxx Thomas MD     PATIENT SUMMARY:   Malorie Taylor is a 44 y.o. female with a history of bilateral renal lesions and recently diagnosed tuberous sclerosis.  IR needle biopsy of a left hilar mass approximately one year ago was significant for low grade oncocytic tumor.  There was rapid growth of the mass to 5.8 cm so she underwent L partial nephrectomy with enucleation of the mass on 4/11 with positive margins.  Final path shows renal cell carcinoma (G2) with clear cell features and low-grade oncocytic tumor, best characterized as a collision tumor with both low-grade oncocytic tumor and renal cell carcinoma components.     DISCUSSION:    Would treat positive margin with just surveillance.  (Per path report there are positive margins at both at the RCC and oncocytoma aspects of the tumor).  She is high risk for development of multiple tumors with her hx of tuberous sclerosis.  Would not have pursued radical nephrectomy instead of partial nephrectomy for that reason.       PERFORMANCE STATUS:  ECOG 0    Estimated GFR/CKD Stage: > 60 (Cr 0.8)    Clinical/Pathologic Stage (TNM): [id number]     FACULTY IN ATTENDANCE:    Urologic Oncology: Boyd Lagos MD; Mxax Thomas MD; Franko Louise MD, Alejandro Campbell MD    Radiation Oncology: Ramy Coleman MD; Skinny Brown MD    Hematology/Oncology: Alicia Shanks MD; Cesar Ho MD    Pathology: Devang Phillips MD    CONSULT NEEDED:     [x] Urologic Oncology    [x] Hem/Onc    [] Rad/Onc   []     [] Physical/Occupational Therapy    [] Psychology  [] Other: ___________________    [x] Treatment Guidelines (NCCN and AUA) reviewed and care planned is consistent with guidelines.    PRESENTATION AT CANCER CONFERENCE:         [] Prospective    [] Retrospective     [] Follow-Up          []  Eligible for clinical trial    TUMOR BOARD RECOMMENDATIONS/PLAN/CONSENSUS:     Case discussed among group. Pathology and radiologic images were reviewed (if applicable).    Would treat her positive margin with just standard surveillance imaging.

## 2024-05-14 LAB
FINAL PATHOLOGIC DIAGNOSIS: NORMAL
GROSS: NORMAL
Lab: NORMAL
SUPPLEMENTAL DIAGNOSIS: NORMAL

## 2024-06-10 ENCOUNTER — PATIENT MESSAGE (OUTPATIENT)
Dept: RESEARCH | Facility: OTHER | Age: 44
End: 2024-06-10
Payer: MEDICARE

## 2024-06-26 ENCOUNTER — TELEPHONE (OUTPATIENT)
Dept: PRIMARY CARE CLINIC | Facility: CLINIC | Age: 44
End: 2024-06-26
Payer: MEDICARE

## 2024-06-26 DIAGNOSIS — Z12.31 ENCOUNTER FOR SCREENING MAMMOGRAM FOR MALIGNANT NEOPLASM OF BREAST: Primary | ICD-10-CM

## 2024-06-26 NOTE — TELEPHONE ENCOUNTER
----- Message from Shari Rangel sent at 6/26/2024  1:36 PM CDT -----  Regarding: Requesting Orders  Contact: [phone]  Type:  Mammogram    Caller is requesting to schedule their annual mammogram appointment.  Order is not listed in EPIC.  Please enter order and contact patient to schedule.  Name of Caller:Nelly/mother   Where would they like the mammogram performed[facility]   Would the patient rather a call back or a response via [facility]? Callback   Best Call Back Number:[phone]

## 2024-07-07 ENCOUNTER — DOCUMENTATION ONLY (OUTPATIENT)
Dept: HEMATOLOGY/ONCOLOGY | Facility: CLINIC | Age: 44
End: 2024-07-07
Payer: MEDICARE

## 2024-07-07 ENCOUNTER — PATIENT MESSAGE (OUTPATIENT)
Dept: HEMATOLOGY/ONCOLOGY | Facility: CLINIC | Age: 44
End: 2024-07-07
Payer: MEDICARE

## 2024-07-07 NOTE — PROGRESS NOTES
----- Message -----   From: Juanpablo Hernandez DNP   Sent: 10/6/2023   1:36 PM CDT   To: Maxx Thomas MD     Hi Dr. 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  You'll have access to my full clinic note once signed, but in the meantime I wanted to get your thoughts on what the plan is with her kidney masses.  I know one was biopsied back in January and the pathologist thought it was likely an oncocytoma.  I'm wondering now if there's any chance it/they could be angiomyolipomas, etc.  It doesn't look like HMB-45 or cytokeratin staining were done on the specimen, but I could be overlooking.  Wanted to get your thoughts.     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     Really appreciate you seeing her!     I'll keep you in the look each time I image her masses.     Take care     Maxx

## 2024-07-25 ENCOUNTER — HOSPITAL ENCOUNTER (OUTPATIENT)
Dept: RADIOLOGY | Facility: HOSPITAL | Age: 44
Discharge: HOME | End: 2024-07-25
Attending: STUDENT IN AN ORGANIZED HEALTH CARE EDUCATION/TRAINING PROGRAM
Payer: MEDICARE

## 2024-07-25 DIAGNOSIS — Z12.31 ENCOUNTER FOR SCREENING MAMMOGRAM FOR MALIGNANT NEOPLASM OF BREAST: ICD-10-CM

## 2024-07-25 PROCEDURE — 77067 SCR MAMMO BI INCL CAD: CPT | Mod: TC

## 2024-07-25 PROCEDURE — 77063 BREAST TOMOSYNTHESIS BI: CPT | Mod: 26,,, | Performed by: RADIOLOGY

## 2024-07-25 PROCEDURE — 77067 SCR MAMMO BI INCL CAD: CPT | Mod: 26,,, | Performed by: RADIOLOGY

## 2024-07-29 ENCOUNTER — HOSPITAL ENCOUNTER (OUTPATIENT)
Dept: RADIOLOGY | Facility: HOSPITAL | Age: 44
Discharge: HOME | End: 2024-07-29
Attending: HOSPITALIST
Payer: MEDICARE

## 2024-07-29 ENCOUNTER — HOSPITAL ENCOUNTER (OUTPATIENT)
Dept: RADIOLOGY | Facility: HOSPITAL | Age: 44
Discharge: HOME | End: 2024-07-29
Attending: NURSE PRACTITIONER
Payer: MEDICARE

## 2024-07-29 DIAGNOSIS — C64.2 RENAL CELL CARCINOMA OF LEFT KIDNEY: ICD-10-CM

## 2024-07-29 PROCEDURE — 76700 US EXAM ABDOM COMPLETE: CPT | Mod: TC

## 2024-07-29 PROCEDURE — 76700 US EXAM ABDOM COMPLETE: CPT | Mod: 26,,, | Performed by: RADIOLOGY

## 2024-07-29 PROCEDURE — 74183 MRI ABD W/O CNTR FLWD CNTR: CPT | Mod: 26,,, | Performed by: STUDENT IN AN ORGANIZED HEALTH CARE EDUCATION/TRAINING PROGRAM

## 2024-07-29 PROCEDURE — 74183 MRI ABD W/O CNTR FLWD CNTR: CPT | Mod: TC

## 2024-07-29 PROCEDURE — A9585 GADOBUTROL INJECTION: HCPCS | Performed by: HOSPITALIST

## 2024-07-29 PROCEDURE — 25500020 PHARM REV CODE 255: Performed by: HOSPITALIST

## 2024-07-29 RX ORDER — GADOBUTROL 604.72 MG/ML
10 INJECTION INTRAVENOUS
Status: COMPLETED | OUTPATIENT
Start: 2024-07-29 | End: 2024-07-29

## 2024-07-29 RX ADMIN — GADOBUTROL 10 ML: 604.72 INJECTION INTRAVENOUS at 02:07

## 2024-07-30 ENCOUNTER — TELEPHONE (OUTPATIENT)
Dept: UROLOGY | Facility: CLINIC | Age: 44
End: 2024-07-30
Payer: MEDICARE

## 2024-07-30 NOTE — TELEPHONE ENCOUNTER
----- Message from Rena Ruiz NP sent at 7/30/2024  6:48 AM CDT -----  Please schedule follow up with Dr. Thomas to review results.

## 2024-08-01 ENCOUNTER — OFFICE VISIT (OUTPATIENT)
Dept: HEMATOLOGY/ONCOLOGY | Facility: CLINIC | Age: 44
End: 2024-08-01
Payer: MEDICARE

## 2024-08-01 VITALS
HEART RATE: 71 BPM | BODY MASS INDEX: 39.41 KG/M2 | DIASTOLIC BLOOD PRESSURE: 70 MMHG | WEIGHT: 251.63 LBS | OXYGEN SATURATION: 99 % | SYSTOLIC BLOOD PRESSURE: 159 MMHG

## 2024-08-01 DIAGNOSIS — Q85.1 TUBEROUS SCLEROSIS: ICD-10-CM

## 2024-08-01 DIAGNOSIS — C64.2 RENAL CELL CARCINOMA OF LEFT KIDNEY: Primary | ICD-10-CM

## 2024-08-01 DIAGNOSIS — E87.6 HYPOKALEMIA: ICD-10-CM

## 2024-08-01 PROCEDURE — 3078F DIAST BP <80 MM HG: CPT | Mod: CPTII,S$GLB,, | Performed by: HOSPITALIST

## 2024-08-01 PROCEDURE — 3008F BODY MASS INDEX DOCD: CPT | Mod: CPTII,S$GLB,, | Performed by: HOSPITALIST

## 2024-08-01 PROCEDURE — 99999 PR PBB SHADOW E&M-EST. PATIENT-LVL III: CPT | Mod: PBBFAC,,, | Performed by: HOSPITALIST

## 2024-08-01 PROCEDURE — 99214 OFFICE O/P EST MOD 30 MIN: CPT | Mod: S$GLB,,, | Performed by: HOSPITALIST

## 2024-08-01 PROCEDURE — 3077F SYST BP >= 140 MM HG: CPT | Mod: CPTII,S$GLB,, | Performed by: HOSPITALIST

## 2024-08-01 PROCEDURE — G2211 COMPLEX E/M VISIT ADD ON: HCPCS | Mod: S$GLB,,, | Performed by: HOSPITALIST

## 2024-08-01 PROCEDURE — 1159F MED LIST DOCD IN RCRD: CPT | Mod: CPTII,S$GLB,, | Performed by: HOSPITALIST

## 2024-08-01 RX ORDER — POTASSIUM CHLORIDE 20 MEQ/1
20 TABLET, EXTENDED RELEASE ORAL 2 TIMES DAILY
Qty: 14 TABLET | Refills: 0 | Status: SHIPPED | OUTPATIENT
Start: 2024-08-01 | End: 2024-08-08

## 2024-08-01 NOTE — ASSESSMENT & PLAN NOTE
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

## 2024-08-01 NOTE — ASSESSMENT & PLAN NOTE
MR imaging with stable remaining small renal lesions. No indication for systemic therapy at this point. Will recommend ongoing surveillance with urology; presumably q6 month MR.   - Repeat MR in 6 months  - Can follow with urology for ongoing surveillance

## 2024-08-01 NOTE — PROGRESS NOTES
MEDICAL ONCOLOGY - FOLLOW UP PATIENT VISIT    Best Contact Phone Number(s): [phone] (home)      Cancer/Stage/TNM:    Cancer Staging   Renal cell carcinoma of left kidney  Staging form: Kidney, AJCC 8th Edition  - Clinical: Stage I (cT1b, cN0, cM0) - Signed by Cesar Ho MD on 4/3/2024       Reason for visit: Renal cell neoplasm in setting of tuberous sclerosis    HPI: Malorie Taylor is a 44 y.o. female with intellectual and seizure disorder found to have a leftrenal mass 10/2022 in setting of abdominal pain. 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 Subsequent imaging with progression of oncocytic neoplasm along with two smaller tumors. She udnerwent partial left nephrectomy 04/2023 with collision low grade ccRCC/oncocytic neoplasm. She presents to medical oncology clinic for surveillance.    History has been obtained by chart review and discussion with the patient.      Interval Events:    Recovered well from surgery in April. Felt sore for a few weeks. Currently feels well. Appetie good. No N/V. No weight loss. Normal urination.    Oncology History   Renal cell carcinoma of left kidney   10/2022 Imaging Significant Findings       11/2022 Imaging Significant Findings    MR Abdomen 11/16/22  Impression:  1. Exam limited by motion artifact.  2. Solid enhancing left renal mass concerning for malignancy.  Left renal vein is patent.  No evidence of metastatic disease.  3. Right hepatic lobe cyst.  4. Hepatomegaly.     12/8/2022 Initial Diagnosis    Renal cell carcinoma of left kidney      Imaging Significant Findings    10/2022: US abdomen in setting of abdominal pain  Impression:  - Mild hepatomegaly.  - Ovoid isoechoic structure arising from the left kidney, favored to represent prominent renal lobulation when compared to prior CT from 2019.  Difficult to entirely exclude underlying neoplastic process.  Consider further  "assessment with contrast-enhanced MRI or CT renal mass protocol.     1/2023 Biopsy    1/18/23 IR biopsy left renal mass  LEFT RENAL MASS, CT-GUIDED BIOPSY WITH PATHOLOGIST ASSISTED ADEQUACY   (CYTOLOGY AND CELL BLOCK):   Oncocytic neoplasm, suggestive of low-grade oncocytic tumor.     The specimen has solid architecture, eosinophilic cells and round to oval   "low-grade" nuclei.  Immunohistochemistry demonstrates positivity for PAX8, CK7 and AMACR.  It is negative for CD10, , CK20 and vimentin.  This  morphology along with this immunohistochemical profile is suggestive of  low-grade oncocytic tumor however, taking into account possible tumor  heterogenicity, we can not rule out a high-grade oncocytic tumor or  eosinophilic variant of chromophobe renal cell carcinoma in this limited  specimen.      4/2023 Imaging Significant Findings    Impression:     Dominant lesion medial left kidney as above.  This is slightly larger.     Left lateral renal lesion and left upper pole renal lesions as above.  There is a right lower pole renal lesion.  All these are worrisome for renal cell carcinoma.     Hemangioma in the liver.     8/2023 Imaging Significant Findings    8/25/23  Impression:   Dominant left renal mass is slightly increased in size compared to November 2022.  This was previously biopsied and described as an oncocytoma on the pathology report.  I see several hypodense masses in both kidneys, the largest on the left corresponding to the abnormality seen on recent ultrasound.  These may represent additional oncocytomas.  Multiple renal masses can be seen in the setting of sarcoidosis, particularly in this patient who has multiple small pulmonary nodules.  Multiple small renal masses can also be seen in the setting of lymphoma, though I see no evidence for splenomegaly or retroperitoneal lymph node enlargement which I would expect to see in the setting of lymphoma.  According to the electronic medical record, this " patient also has a history of seizures.  Genetic testing may be helpful in potentially providing a unifying diagnosis for the seizures, multiple pulmonary nodules, and multiple renal masses if this has not already been performed.     9/2023 Notable Event    09/2023: Established with genetic clinic. 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  Given patient's history of tuberous sclerosis, multifocal micronodular pneumocyte hyperplasia (MMPH) is a consideration although not entirely specific.  The differential includes infectious, inflammatory and metastatic disease.  Surveillance is recommended with surveillance interval to be determined by pulmonary medicine.     2/2024 Imaging Significant Findings    2/15/24 US Kidney  Impression:  - Interval increase in size of the mass in the medial interpolar region of the left kidney which now appears hypoechoic relative to renal cortex.  Further evaluation with MRI renal mass protocol is recommended  - Stable isoechoic lesion in the inferior pole of left kidney.  - Likely hemangioma in the right hepatic lobe, stable.     4/3/2024 Cancer Staged    Staging form: Kidney, AJCC 8th Edition  - Clinical: Stage I (cT1b, cN0, cM0)     4/2024 Surgery    04/11/24 Partial left nephrectomy  2. Left kidney, left renal mass, partial nephrectomy:   Renal cell carcinoma with clear cell features and low-grade oncocytic tumor, best characterized as a collision tumor with both low-grade oncocytic tumor and renal cell carcinoma components, see note.    Tumor extends to black inked surgical margin.   See comment and [facility] consultative opinion attached.     pT1bNx     7/2024 Imaging Significant Findings      07/29/24 MR Abdomen  Impression:  - Interval partial left nephrectomy.  - Remaining bilateral renal lesions appear  similar compared to most recent MRI 04/03/2024  - No findings to suggest metastasis.  - Hepatomegaly with stable appearing hepatic dome lesion, likely representing a hemangioma.          Past Medical History:   Diagnosis Date    Allergy     Learning disability     approximate age 8-10    Obesity 10/31/2015    Seizures     epilepsy, last seizure teenage years        Past Surgical History:   Procedure Laterality Date    BIOPSY, WITH CT GUIDANCE Left 1/18/2023    Procedure: RENAL MASS BIOPSY, WITH CT GUIDANCE;  Surgeon: Luis Eduardo Nassar MD;  Location: [facility] CATH LAB;  Service: Radiology;  Laterality: Left;    ROBOT-ASSISTED LAPAROSCOPIC PARTIAL NEPHRECTOMY Left 4/11/2024    Procedure: ROBOTIC NEPHRECTOMY, PARTIAL;  Surgeon: Maxx Thomas MD;  Location: [facility] OR;  Service: Urology;  Laterality: Left;        Family History   Problem Relation Name Age of Onset    Hyperlipidemia Mother Nelly     Hypertension Mother Nelly     Colonic polyp Mother Nelly         a couple in the '80s    Arrhythmia Father Dani     Heart disease Father Ouray     Cholecystitis Sister Nataly     Cataracts Maternal Grandmother          in either this relative or this relative's mother    Macular degeneration Maternal Grandmother          WET - AMD    Hyperlipidemia Maternal Grandmother          later in life    Insomnia Maternal Grandmother      Hypertension Maternal Grandmother      Glaucoma Maternal Grandmother      Dementia Maternal Grandmother      Alzheimer's disease Maternal Grandmother      Heart disease Maternal Grandmother      Other Maternal Grandmother          benign breast tumor    Macular degeneration Maternal Grandfather Dale         DRY - AMD    Vision loss Maternal Grandfather Dale         corneal guttata (thin)    Hyperlipidemia Maternal Grandfather Dale         later in life    Alzheimer's disease Maternal Grandfather Suhail     Hypertension Maternal Grandfather Dale     Heart disease Paternal  Grandmother Mehnaz     Emphysema Paternal Grandmother Mehnaz     Cervical cancer Paternal Grandmother Mehnaz     Insomnia Maternal Aunt Robyn     Glaucoma Maternal Aunt Robyn     Coronary artery disease Maternal Aunt Robyn     Sleep apnea Maternal Uncle Edgar     Macular degeneration Maternal Uncle Edgar     Diabetes Other Ifrah         insulin-dependent    Breast cancer Other Lulu         age at dx unk    Lung cancer Other Mario     Epilepsy Other      Tuberculosis Other      Heart attack Other Kerrie     Heart attack Other Ibrahima     Heart disease Other Ibrahima     Glaucoma Other Aiden     Heart disease Other Aiden     Heart attack Other Laney     Macular degeneration Other Carr     Diverticulitis Other Carr     Diabetes Other Carr     Breast cancer Other Carr 68    Heart disease Other Carr     Kidney disease Other      Dementia Other Cornelia     Heart disease Other H[place]ton     Heart disease Other Luke     Acne Neg Hx      Eczema Neg Hx      Lupus Neg Hx      Psoriasis Neg Hx      Melanoma Neg Hx      Colon cancer Neg Hx      Ovarian cancer Neg Hx      Kidney cancer Neg Hx          Social History     Tobacco Use    Smoking status: Never    Smokeless tobacco: Never   Substance Use Topics    Alcohol use: Never        I have reviewed and updated the patient's past medical, surgical, family and social histories.    Review of patient's allergies indicates:   Allergen Reactions    Phenobarbital      lethargy        Current Outpatient Medications   Medication Sig Dispense Refill    b complex vitamins capsule Take 1 capsule by mouth 3 (three) times a week.      carBAMazepine (TEGRETOL XR) 400 MG Tb12 Take 1 tablet (400 mg total) by mouth 2 (two) times daily. 180 tablet 3    cranberry 500 mg Cap Take by mouth Daily.      cranberry fruit concentrate (AZO CRANBERRY ORAL) Take by mouth.      ferrous sulfate (FEOSOL) 325 mg (65 mg iron) Tab tablet Take 325 mg by mouth daily with breakfast.      folic acid  (FOLVITE) 400 MCG tablet Take 400 mcg by mouth once daily.      gabapentin (NEURONTIN) 600 MG tablet Take 2 tablets (1,200 mg total) by mouth 3 (three) times daily. 540 tablet 3    hydrocortisone (ANUSOL-HC) 2.5 % rectal cream Place rectally 2 (two) times daily. 28 g 1    multivitamin capsule Take 1 capsule by mouth once daily.      polyethylene glycol (GLYCOLAX) 17 gram/dose powder Take 17 g by mouth once daily. 238 g 0    potassium chloride SA (K-DUR,KLOR-CON) 20 MEQ tablet Take 1 tablet (20 mEq total) by mouth 2 (two) times daily. for 7 days 14 tablet 0    topiramate (TOPAMAX) 200 MG Tab Take 1 tablet (200 mg total) by mouth 2 (two) times daily. 180 tablet 3    vitamin D 1000 units Tab Take 185 mg by mouth once daily.       No current facility-administered medications for this visit.        Physical Exam:   BP (!) 159/70 (BP Location: Left arm, Patient Position: Sitting, BP Method: Large (Automatic))   Pulse 71   Wt 114.1 kg (251 lb 9.6 oz)   SpO2 99%   BMI 39.41 kg/m²      ECOG Performance status: 1            Physical Exam  Constitutional:       General: She is not in acute distress.     Appearance: She is normal weight.   HENT:      Head: Normocephalic.   Eyes:      General: No scleral icterus.     Conjunctiva/sclera: Conjunctivae normal.   Cardiovascular:      Rate and Rhythm: Normal rate.   Pulmonary:      Effort: Pulmonary effort is normal. No respiratory distress.   Abdominal:      General: There is no distension.      Palpations: Abdomen is soft.   Musculoskeletal:         General: Normal range of motion.      Cervical back: Normal range of motion and neck supple.   Skin:     General: Skin is warm.      Coloration: Skin is not jaundiced.   Neurological:      General: No focal deficit present.      Mental Status: She is alert and oriented to person, place, and time.   Psychiatric:         Mood and Affect: Mood normal.         Behavior: Behavior normal.         Thought Content: Thought content normal.            Labs:   No results found for this or any previous visit (from the past 48 hour(s)).         Imaging:       MRI Abdomen W WO Contrast  Narrative: EXAMINATION:  MRI ABDOMEN W WO CONTRAST    CLINICAL HISTORY:  Metastatic disease evaluation;  Malignant neoplasm of left kidney, except renal pelvis    TECHNIQUE:  Multiplanar multisequence images of the abdomen before and after administration of 10 mL Gadavist intravenous contrast.    COMPARISON:  MRI abdomen 04/03/2024    FINDINGS:  Images are degraded by motion artifact.    LOWER THORAX: Unremarkable.    LIVER: Mildly enlarged.  Similar appearance of intermediate T2 hyperintense lesion within the hepatic dome.  No new focal hepatic lesion.    BILIARY: Normal gallbladder. No biliary ductal dilatation.    SPLEEN: No splenomegaly.    PANCREAS: No focal masses or ductal dilatation.    ADRENALS: No adrenal nodules.    KIDNEYS/URETERS: Interval postoperative changes of partial left nephrectomy (series 4, image 23).  No abnormal enhancement within the partial nephrectomy bed to indicate residual disease/recurrence.  Multiple bilateral renal masses, similar to prior.  The dominant left renal mass measures 2.4 cm (series 15, image 47), previously 2.4 cm.  The inferior right renal lesion measures 1.2 cm (series 15, image 52), previously 1.2 cm.    PERITONEUM / RETROPERITONEUM: No upper abdominal free fluid.    LYMPH NODES: No upper abdominal lymphadenopathy.    VESSELS: Abdominal aorta tapers normally.  Portal and hepatic veins are patent.    GI TRACT: No distention or wall thickening.    BONES AND SOFT TISSUES: No marrow replacing lesions.  Generalized body wall edema.  Impression: Interval partial left nephrectomy.    Remaining bilateral renal lesions appear similar compared to most recent MRI 04/03/2024    No findings to suggest metastasis.    Hepatomegaly with stable appearing hepatic dome lesion, likely representing a hemangioma.    Electronically signed by  resident: Franko Robles  Date:    07/29/2024  Time:    15:13    Electronically signed by: Anurag Rojas  Date:    07/29/2024  Time:    17:15  US Abdomen Complete  Narrative: EXAMINATION:  US ABDOMEN COMPLETE    CLINICAL HISTORY:  Malignant neoplasm of left kidney, except renal pelvis    TECHNIQUE:  Complete abdominal ultrasound (including pancreas, liver, gallbladder, common bile duct, spleen, aorta, IVC, and kidneys) was performed.    COMPARISON:  MRI 07/29/2024, ultrasound 10/21/2022, 02/15/2024.    FINDINGS:  Liver: Borderline in size, measuring 17.7 cm. Homogeneous echotexture.  Echogenic 1.6 x 2.0 x 1.7 cm lesion noted in the right lobe, similar to prior study.    Gallbladder: No calculi, wall thickening, or pericholecystic fluid.  No sonographic Miller's sign.    Biliary system: The common duct is not dilated, measuring 4 mm.  No intrahepatic ductal dilatation.    Spleen: Normal in size with a homogeneous echotexture, measuring 9.1 cm.    Pancreas: Largely obscured.    Right kidney: Normal in size with no hydronephrosis, measuring 11.4 cm.    Left kidney:  Normal in size with no hydronephrosis, measuring 11.5 cm.  Sequela of partial nephrectomy noted.    Aorta: No aneurysm.    Inferior vena cava: Normal in appearance.    Miscellaneous: No ascites.  Impression: Echogenic right hepatic lobe mass similar to prior study, probably hemangioma.    Electronically signed by: González Mancera MD  Date:    07/29/2024  Time:    15:51      I have personally reviewed the above imaging    Path:   Reviewed pathology as documented in oncology history      Assessment and Plan:   1. Renal cell carcinoma of left kidney  Overview:  Multifocal tumors in left kidney; dominant tumor biopsy proven oncocytic neoplasm. Underwent partial nephrectomy 04/2024.  She udnerwent partial left nephrectomy 04/2023 with collision low grade ccRCC/oncocytic neoplasm.    Assessment & Plan:  MR imaging with stable remaining small renal lesions. No  indication for systemic therapy at this point. Will recommend ongoing surveillance with urology; presumably q6 month MR.   - Repeat MR in 6 months  - Can follow with urology for ongoing surveillance    Orders:  -     MRI Abdomen-Pelvis w w/o Contrast (XPD); Future; Expected date: 08/01/2024    2. Hypokalemia  -     potassium chloride SA (K-DUR,KLOR-CON) 20 MEQ tablet; Take 1 tablet (20 mEq total) by mouth 2 (two) times daily. for 7 days  Dispense: 14 tablet; Refill: 0    3. Tuberous sclerosis  Assessment & Plan:  - Follow up with neurology and her PCP  - Also with apparent multifocal micronodular pneumocyte hyperplasia (MMPH) on lung imaging; asympomtatic                   Follow up:   Route Chart for Scheduling    Med Onc Chart Routing      Follow up with physician No follow up needed. Will follow up with urology   Follow up with CHARLEY    Infusion scheduling note    Injection scheduling note    Labs    Imaging MRI   6 months   Pharmacy appointment    Other referrals                         The above information has been reviewed with the patient and all questions have been answered to their apparent satisfaction.  They understand that they can call the clinic with any questions.    Cesar Ho MD  Hematology/Oncology  [facility]

## 2024-08-19 NOTE — PROGRESS NOTES
Subjective:      Malorie Taylor is a 44 y.o. female who returns today regarding her     No complaints.    The following portions of the patient's history were reviewed and updated as appropriate: allergies, current medications, past family history, past medical history, past social history, past surgical history and problem list.    Review of Systems  Pertinent items are noted in HPI.  A comprehensive multipoint review of systems was negative except as otherwise stated in the HPI.    Past Medical History:   Diagnosis Date    Allergy     Learning disability     approximate age 8-10    Obesity 10/31/2015    Seizures     epilepsy, last seizure teenage years     Past Surgical History:   Procedure Laterality Date    BIOPSY, WITH CT GUIDANCE Left 1/18/2023    Procedure: RENAL MASS BIOPSY, WITH CT GUIDANCE;  Surgeon: Luis Eduardo Nassar MD;  Location: [facility], [place] CATH LAB;  Service: Radiology;  Laterality: Left;    ROBOT-ASSISTED LAPAROSCOPIC PARTIAL NEPHRECTOMY Left 4/11/2024    Procedure: ROBOTIC NEPHRECTOMY, PARTIAL;  Surgeon: Maxx Thomas MD;  Location: [facility], [place] OR;  Service: Urology;  Laterality: Left;       Review of patient's allergies indicates:   Allergen Reactions    Phenobarbital      lethargy          Objective:   Vitals: There were no vitals taken for this visit.    Physical Exam   General: alert and oriented, no acute distress  Respiratory: Symmetric expansion, non-labored breathing  Cardiovascular: no peripheral edema  Abdomen: soft, non distended  Skin: normal coloration and turgor, no rashes, no suspicious skin lesions noted  Neuro: no gross deficits  Psych: normal judgment and insight, normal mood/affect, and non-anxious  Wounds well healed    Physical Exam    Lab Review   Urinalysis demonstrates negative for all components  Lab Results   Component Value Date    WBC 5.93 07/29/2024    HGB 11.8 (L) 07/29/2024    HCT 35.8 (L) 07/29/2024     (H) 07/29/2024     07/29/2024     Lab Results    Component Value Date    CREATININE 0.8 07/29/2024    BUN 11 07/29/2024     inal Pathologic Diagnosis     1. Submitted as &quot;fat over tumor mass&quot;, biopsy:  Benign fibroadipose tissue and blood vessels.  Negative for malignancy.      2. Left kidney, left renal mass, partial nephrectomy:  Renal cell carcinoma with clear cell features and low-grade oncocytic tumor, best characterized as a collision tumor with both low-grade oncocytic tumor and renal cell carcinoma components, see note.    Tumor extends to black inked surgical margin.  See comment and [facility] consultative opinion attached.    Note:  Additionally, molecular proliferation of the clear cell proliferation will be completed and reported separately in a supplemental by [facility] to reach a more definitive classification.        Comment:  Immunohistochemical stains are completed on the left renal mass and show following:    AMACR:  Positive in oncocytic portion tumor, negative in the clear cell papillary renal cell tumor portion.  CD10:  Negative in the oncocytic portion of the tumor; positive in the clear cell papillary renal tumor portion.  :  Positive staining in the oncocytic portion of the tumor; negative in the clear cell papillary renal cell tumor portion.  Cytokeratin 7:  Positive in both the oncocytic and clear cell papillary renal cell tumor portions.  Colloidal iron:  Negative within both tumor morphologies.    All stains have satisfactory positive negative controls.    Synoptic Report:  Specimen:  Procedure:  Partial nephrectomy  Specimen laterality:  Left    Tumor:  Tumor focality:  Unifocal    Tumor size:  Greatest dimension in cm:  5.8 cm    Histologic type:  Collision tumor with two separate components including renal cell carcinoma with clear cell features and low-grade oncocytic tumor.    Histologic grade:  Renal cell component:  G2    Tumor extent:  Limited to the kidney    Sarcomatoid features:  Not identified  Rhabdoid  features:  Not identified  Tumor necrosis:  Not identified    Margins:  Margin status:  Both low-grade oncocytic tumor and renal cell carcinoma with clear cell features components extend to the inked parenchymal resection margin.    Regional lymph nodes:  Not applicable (no regional lymph nodes submitted or found).    Distant metastasis:  Not applicable.      Pathologic stage classification:  TNM descriptors:  Not applicable    Primary tumor:       pT1b:  Tumor greater than 4 cm but less than or equal to 7 cm in greatest dimension; limited to the kidney.  Regional lymph nodes:       pNX:  Cannot be assessed  Distant metastasis:       pMX:  Cannot be assessed.    Additional findings:  Insufficient nonneoplastic kidney tissue for evaluation.      Tumor Blocks for possible Future Studies:  2A, 2C, 2E.    Note:  See [facility] diagnostic consultative opinion below.      [facility] FINAL DIAGNOSIS  Interpretation MCR  Kidney, left, nephrectomy (UBS-24?1471; 04/11/2024): Renal cell carcinoma with clear cell features and low-grade oncocytic tumor, see comment.    COMMENT  I have reviewed representative slides of a nephrectomy specimen taken from the above-mentioned patient, a 44-year-old female. Per  submitted gross report, the patient has a 5.8 x 4.7 x 3.0 cm yellow-brown mass. I have reviewed this case because of my interest in  genitourinary pathology.    Histologic sections reveal two distinct but abutting epithelial proliferations. One population consists of a nodule of optically clear cells with basal nuclei arranged in acinar/tubular configurations. Few fibromuscular septa pass through the clear cell  mass. The second population surrounds the first and is characterized by oncocytic cells with rounded nuclei arranged in tightly packed nests and occasionally forming cords in edematous stroma. The submitted immunostains are reviewed (block 2A). The clear   cell component expresses CK7 and CD10 while negative for  and  AMACR. The oncocytic component marks with CK7 and AMACR while negative for  and CD10.    In my opinion, this lesion is best characterized as a collision tumor with a low-grade oncocytic tumor (LOT) component surrounding a  nodule of renal cell carcinoma with clear cell features. The latter has morphologic features suggestive of renal cell carcinoma with  fibromyomatous stroma which may exhibit ELOC (formerly TCEB1 ), TSC1 , TSC2 , or mTOR alterations. Similarly, LOTs have been  demonstrated to exhibit mTOR alterations as well. Molecular proliferation of the clear cell proliferation will be completed and reported  separately as an addendum to reach a more definitive diagnosis. Given the patient's young age and the presence of possibly two mTOR- altered tumors, germline testing may be considered.    Thank you for sharing this case with me. If you have any questions, please do not hesitate to reach me by calling [facility] at [phone].    Report electronically signed by  Marlene Sampson M.D    Report attached.    Performing location:  [place] [address]    &quot;Disclaimer:  This case diagnosis was rendered completely by the outside consultation pathologist and the case is electronically signed by an [facility] pathologist listed below solely to release the report into the medical record.&quot; VC      Comment: Interp By NAINA Jin M.D., Signed on 05/14/2024 at 16:30   Supplemental Diagnosis     SUPPLEMENTAL #1    [facility] Cancer Panel    Result  Provided diagnosis: kidney renal cell carcinoma    The following CLINICALLY RELEVANT VARIANTS were detected:  Gene: TSC1  DNA Change: c.1525C>T (Exon 15)  Amino Acid Change: p.R509* (Ddb996*)  Variant Allele Frequency: 23.9%    Gene: TSC1  DNA Change: c.2599C>T (Exon 20)  Amino Acid Change: p.Q867* (Mgg752*)  Variant Allele Frequency: 54%    Microsatellite  Instability (MSI) status: Stable (LORRAINE)  No other reportable sequence variants were detected within the analyzed regions of the tested genes listed in the method description.    Interpretation  1) TSC1 c.1525C>T (p.R509*) (Exon 15) and c.2599C>T (p.Q867*) (Exon 20)    TSC1 encodes hamartin, which forms a heterodimer with tuberin (encoded by TSC2) to act as a GTPase activating protein for Rheb, a potent activator of the mammalian target of rapamycin (mTOR) [PMID:70706563, PMID:54000452, PMID:21453139]. By converting  Rheb from a GTP- to GDP-bound state, the hamartin/tuberin heterodimer inactivates Rheb and suppresses mTOR activity [PMID:96298041, PMID:92430610]. Inactivation of TSC1, mainly through deletions and mutations, leads to activation of the mTOR signaling  pathway and contributes to tumorigenesis [PMID:34082448, PMID:03668252].    TSC1 mutations have been reported in 2?3% of renal tumor samples [COSMIC, [employer]Portal [employer]].    Released By  Jonathan Pina, Ph.D.      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 see comment.    COMMENT: Next generation sequencing studies of the tumor composed of clear cells, demonstrate the presence of TSC1-mutation. These findings argue against a diagnosis of clear cell renal cell carcinoma and support the diagnosis of renal cell carcinoma  with fibromyomatous stroma. Germline mutation testing may be considered to rule out tuberous sclerosis complex.    [facility] Kidney Cancer Panel (KCP):  Result Summary  Provided diagnosis: kidney renal cell carcinoma    The following CLINICALLY RELEVANT VARIANTS were detected:  Gene: TSC1  DNA Change: c.1525C>T (Exon 15)  Amino Acid Change: p.R509* (Nuv821*)  Variant Allele Frequency: 23.9%    Gene: TSC1  DNA Change: c.2599C>T (Exon 20)  Amino  Acid Change: p.Q867* (Snu730*)  Variant Allele Frequency: 54%    Microsatellite Instability (MSI) status: Stable (LORRAINE)    No other reportable sequence variants were detected within the analyzed regions of the tested genes listed in the method description.    Interpretation  1) TSC1 c.1525C>T (p.R509*) (Exon 15) and c.2599C>T (p.Q867*) (Exon 20)    TSC1 encodes hamartin, which forms a heterodimer with tuberin (encoded by TSC2) to act as a GTPase activating protein for Rheb, a potent activator of the mammalian target of rapamycin (mTOR) [PMID:55531752, PMID:14772897, PMID:34964831]. By converting  Rheb  from a GTP- to GDP-bound state, the hamartin/tuberin heterodimer inactivates Rheb and suppresses mTOR activity [PMID:71633951, PMID:79869126]. Inactivation of TSC1, mainly through deletions and mutations, leads to activation of the mTOR signaling pathway   and  contributes to tumorigenesis [PMID:60993264, PMID:10392068]. TSC1 mutations have been reported in 2?3% of renal tumor samples [COSMIC, cBioPortal for Cancer Gen[employer]]. TSC1 mutations are described in the literature in new and emerging renal entities  including renal cell carcinoma with fibromyomatous stroma, eosinophilic solid and cystic renal cell carcinoma, eosinophilic vacuolated tumor, low grade oncocytic tumor, and angiomyolipoma [PMID: 02867207, PMID: 49274367, PMID: 63317768]. The presence of  this alteration in this specimen should be interpreted in correlation with histopathological and clinico-radiological findings.    The loss of TSC1 function has been reported to lead to activation of mTOR, suggesting that TSC1 loss or hamartin inactivation may predict sensitivity to mTOR inhibitors [PMID:80260982, PMID:60777543]. In a number of cancer types (including bladder,  breast, thyroid, and  PEComa), patients who have had exceptional responses to mTOR inhibitors have been found to harbor TSC1 or TSC2 inactivating alterations [PMID:10804867, PMID:47745927,  PMID:52885824, PMID:81897311, PMID:56592825]. However, additional studies are  needed to assess the efficacy of these therapies in solid tumors.    MSI: Stable (LORRAINE)  NO evidence of microsatellite instability was detected suggesting the presence of normal DNA mismatch repair function within the tumor. Current data suggest that advanced stage solid tumors with intact mismatch repair (LORRAINE) are less likely to be  responsive to treatment with  immunotherapies such as anti-PD-1 therapies [PMID 47302998, PMID 16831715].    Signed by Marlene Sampson M.D           Imaging  MRI ABDOMEN W WO CONTRAST     CLINICAL HISTORY:  Metastatic disease evaluation;  Malignant neoplasm of left kidney, except renal pelvis     TECHNIQUE:  Multiplanar multisequence images of the abdomen before and after administration of 10 mL Gadavist intravenous contrast.     COMPARISON:  MRI abdomen 04/03/2024     FINDINGS:  Images are degraded by motion artifact.     LOWER THORAX: Unremarkable.     LIVER: Mildly enlarged.  Similar appearance of intermediate T2 hyperintense lesion within the hepatic dome.  No new focal hepatic lesion.     BILIARY: Normal gallbladder. No biliary ductal dilatation.     SPLEEN: No splenomegaly.     PANCREAS: No focal masses or ductal dilatation.     ADRENALS: No adrenal nodules.     KIDNEYS/URETERS: Interval postoperative changes of partial left nephrectomy (series 4, image 23).  No abnormal enhancement within the partial nephrectomy bed to indicate residual disease/recurrence.  Multiple bilateral renal masses, similar to prior.  The dominant left renal mass measures 2.4 cm (series 15, image 47), previously 2.4 cm.  The inferior right renal lesion measures 1.2 cm (series 15, image 52), previously 1.2 cm.     PERITONEUM / RETROPERITONEUM: No upper abdominal free fluid.     LYMPH NODES: No upper abdominal lymphadenopathy.     VESSELS: Abdominal aorta tapers normally.  Portal and hepatic veins are patent.     GI  TRACT: No distention or wall thickening.     BONES AND SOFT TISSUES: No marrow replacing lesions.  Generalized body wall edema.     Impression:     Interval partial left nephrectomy.     Remaining bilateral renal lesions appear similar compared to most recent MRI 04/03/2024     No findings to suggest metastasis.     Hepatomegaly with stable appearing hepatic dome lesion, likely representing a hemangioma.     Electronically signed by resident: Franko Robles  Date:                                            07/29/2024  Time:                                           15:13     Electronically signed by:Anurag Rojas  Date:                                            07/29/2024  Time:                                           17:15    Assessment and Plan:   Renal cell carcinoma of left kidney  Grade 2 pT1b cN0 cM0   Sp robotic partial nephrectomy  Will monitor +margin with radiographic surveillance    Renal oncocytoma, unspecified laterality  As above  Consider everolimus if there is recurrence/progression    Tuberous sclerosis  Per Heme/onc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    MRI Q 6 months  Follow up with heme onc, Dr Ho, also

## 2024-08-20 ENCOUNTER — OFFICE VISIT (OUTPATIENT)
Dept: UROLOGY | Facility: CLINIC | Age: 44
End: 2024-08-20
Payer: MEDICARE

## 2024-08-20 VITALS
RESPIRATION RATE: 18 BRPM | BODY MASS INDEX: 39.36 KG/M2 | SYSTOLIC BLOOD PRESSURE: 136 MMHG | WEIGHT: 251.31 LBS | HEART RATE: 89 BPM | DIASTOLIC BLOOD PRESSURE: 84 MMHG

## 2024-08-20 DIAGNOSIS — C64.2 RENAL CELL CARCINOMA OF LEFT KIDNEY: Primary | ICD-10-CM

## 2024-08-20 DIAGNOSIS — Q85.1 TUBEROUS SCLEROSIS: ICD-10-CM

## 2024-08-20 DIAGNOSIS — D30.00 RENAL ONCOCYTOMA, UNSPECIFIED LATERALITY: ICD-10-CM

## 2024-08-20 PROCEDURE — 3075F SYST BP GE 130 - 139MM HG: CPT | Mod: CPTII,S$GLB,, | Performed by: UROLOGY

## 2024-08-20 PROCEDURE — 3008F BODY MASS INDEX DOCD: CPT | Mod: CPTII,S$GLB,, | Performed by: UROLOGY

## 2024-08-20 PROCEDURE — 3079F DIAST BP 80-89 MM HG: CPT | Mod: CPTII,S$GLB,, | Performed by: UROLOGY

## 2024-08-20 PROCEDURE — 99214 OFFICE O/P EST MOD 30 MIN: CPT | Mod: S$GLB,,, | Performed by: UROLOGY

## 2024-10-23 DIAGNOSIS — G40.909 SEIZURE DISORDER: ICD-10-CM

## 2024-10-23 NOTE — TELEPHONE ENCOUNTER
----- Message from Em sent at 10/23/2024  3:19 PM CDT -----  Regarding: refill rx  Contact: [place] @ [phone]  Rx Refill/Request Is this a Refill or New Rx: refill     Rx Name and Strength: gabapentin (NEURONTIN) 600 MG tablet, carBAMazepine (TEGRETOL XR) 400 MG Tb12, and topiramate (TOPAMAX) 200 MG Tab     Preferred Pharmacy with phone number:   [id number] - [place], [place] - [address] AT [facility] & [address] [address] [place] [address]  Phone: [phone] Fax: [phone]    Communication Preference: call back     Additional Information:  Pt is going to need refills of rxs  gabapentin (NEURONTIN) 600 MG tablet, carBAMazepine (TEGRETOL XR) 400 MG Tb12, and topiramate (TOPAMAX) 200 MG Tab as her provider is no longer with [facility]. Please call to advise further as pt is needing a appt for yearly follow up. Thank you for all you are doing.

## 2024-10-24 RX ORDER — TOPIRAMATE 200 MG/1
200 TABLET ORAL 2 TIMES DAILY
Qty: 180 TABLET | Refills: 3 | Status: SHIPPED | OUTPATIENT
Start: 2024-10-24 | End: 2025-10-24

## 2024-10-24 RX ORDER — CARBAMAZEPINE 400 MG/1
400 TABLET, EXTENDED RELEASE ORAL 2 TIMES DAILY
Qty: 180 TABLET | Refills: 3 | Status: SHIPPED | OUTPATIENT
Start: 2024-10-24 | End: 2025-10-25

## 2024-10-24 RX ORDER — GABAPENTIN 600 MG/1
1200 TABLET ORAL 3 TIMES DAILY
Qty: 540 TABLET | Refills: 3 | Status: SHIPPED | OUTPATIENT
Start: 2024-10-24 | End: 2025-10-25

## 2024-12-20 ENCOUNTER — OFFICE VISIT (OUTPATIENT)
Dept: DERMATOLOGY | Facility: CLINIC | Age: 44
End: 2024-12-20
Payer: MEDICARE

## 2024-12-20 DIAGNOSIS — L82.1 SEBORRHEIC KERATOSES: ICD-10-CM

## 2024-12-20 DIAGNOSIS — Z12.83 SCREENING EXAM FOR SKIN CANCER: ICD-10-CM

## 2024-12-20 DIAGNOSIS — D22.9 MULTIPLE BENIGN NEVI: ICD-10-CM

## 2024-12-20 DIAGNOSIS — L71.9 ROSACEA: Primary | ICD-10-CM

## 2024-12-20 DIAGNOSIS — D18.01 CHERRY ANGIOMA: ICD-10-CM

## 2024-12-20 PROCEDURE — 99999 PR PBB SHADOW E&M-EST. PATIENT-LVL III: CPT | Mod: PBBFAC,,, | Performed by: STUDENT IN AN ORGANIZED HEALTH CARE EDUCATION/TRAINING PROGRAM

## 2024-12-20 RX ORDER — METRONIDAZOLE 7.5 MG/G
CREAM TOPICAL
Qty: 45 G | Refills: 3 | Status: SHIPPED | OUTPATIENT
Start: 2024-12-20

## 2024-12-20 NOTE — PROGRESS NOTES
Subjective:      Patient ID:  Malorie Taylor is a 44 y.o. female who presents for No chief complaint on file.    Malorie Taylor is a 44 y.o. female who presents for: UBSE screening exam for skin cancer.    Last office visit 12/5/2023 with me   Here with mom today. Travel from [place].    The patient has no specific concerns today.    Pertinent history:  History of blistering sunburns: No  History of tanning bed use: No  Family history of melanoma: No  Personal history of mole removal: No  Personal history of skin cancer: No         Review of Systems   Skin:  Positive for activity-related sunscreen use. Negative for daily sunscreen use and recent sunburn.   Hematologic/Lymphatic: Does not bruise/bleed easily.       Objective:   Physical Exam   Constitutional: She appears well-developed and well-nourished. No distress.   Neurological: She is alert and oriented to person, place, and time. She is not disoriented.   Psychiatric: She has a normal mood and affect.   Skin:   Areas Examined (abnormalities noted in diagram):   Scalp / Hair Palpated and Inspected  Head / Face Inspection Performed  Neck Inspection Performed  Chest / Axilla Inspection Performed  Abdomen Inspection Performed  Back Inspection Performed  RUE Inspected  LUE Inspection Performed  Nails and Digits Inspection Performed                 Diagram Legend     Erythematous scaling macule/papule c/w actinic keratosis       Vascular papule c/w angioma      Pigmented verrucoid papule/plaque c/w seborrheic keratosis      Yellow umbilicated papule c/w sebaceous hyperplasia      Irregularly shaped tan macule c/w lentigo     1-2 mm smooth white papules consistent with Milia      Movable subcutaneous cyst with punctum c/w epidermal inclusion cyst      Subcutaneous movable cyst c/w pilar cyst      Firm pink to brown papule c/w dermatofibroma      Pedunculated fleshy papule(s) c/w skin tag(s)      Evenly pigmented macule c/w junctional nevus     Mildly variegated  pigmented, slightly irregular-bordered macule c/w mildly atypical nevus      Flesh colored to evenly pigmented papule c/w intradermal nevus       Pink pearly papule/plaque c/w basal cell carcinoma      Erythematous hyperkeratotic cursted plaque c/w SCC      Surgical scar with no sign of skin cancer recurrence      Open and closed comedones      Inflammatory papules and pustules      Verrucoid papule consistent consistent with wart     Erythematous eczematous patches and plaques     Dystrophic onycholytic nail with subungual debris c/w onychomycosis     Umbilicated papule    Erythematous-base heme-crusted tan verrucoid plaque consistent with inflamed seborrheic keratosis     Erythematous Silvery Scaling Plaque c/w Psoriasis     See annotation      Assessment / Plan:        Rosacea  Moderate papulopustular and ETR  -     metronidazole 0.75% (METROCREAM) 0.75 % Crea; AAA face bid  Dispense: 45 g; Refill: 3  If this does not work, offered to send topical oxymetazoline/ivermectin compound from [facility]  Sulfur based face washes such as sulfa-lo cleansing bar (plexion wash not covered)    Seborrheic keratoses  These are benign inherited growths without a malignant potential. Reassurance given to patient. No treatment is necessary.     Multiple benign nevi  Reassurance    Cherry angioma  This is a benign vascular lesion. Reassurance given. No treatment required.     Screening exam for skin cancer  Upper body skin examination performed today including at least 6 points as noted in physical examination. No lesions suspicious for malignancy noted.    Recommend daily sun protection/avoidance and use of at least SPF 30, broad spectrum sunscreen (OTC drug).     Tuberous sclerosis  Mutation TSC1  I have reviewed extensively hem/onc preventative clinic note they recommended screening exam for skin cancer annually  90% of TS cases associated with skin findings- on exam today none apparent aside from dental pits  No itzel-leaf  spots, no periungual fibromas (Keonen's tumors), no angiofibromas (see rosacea above), no Shagreen patch    RTC 1 year, sooner prn

## 2025-01-03 NOTE — Clinical Note
Elan Thomas,  Great that you sent patient for genetics, as she came positive for a TSC1 mutation and therefore has tuberous sclerosis complex.  You'll have access to my full clinic note once signed, but in the meantime I wanted to get your thoughts on what the plan is with her kidney masses.  I know one was biopsied back in January and the pathologist thought it was likely an oncocytoma.  I'm wondering now if there's any chance it/they could be angiomyolipomas, etc.  It doesn't look like HMB-45 or cytokeratin staining were done on the specimen, but I could be overlooking.  Wanted to get your thoughts.  Thanks, Juanpablo  No

## 2025-02-03 ENCOUNTER — HOSPITAL ENCOUNTER (OUTPATIENT)
Dept: RADIOLOGY | Facility: HOSPITAL | Age: 45
Discharge: HOME | End: 2025-02-03
Attending: UROLOGY
Payer: MEDICARE

## 2025-02-03 DIAGNOSIS — C64.2 RENAL CELL CARCINOMA OF LEFT KIDNEY: ICD-10-CM

## 2025-02-03 DIAGNOSIS — Q85.1 TUBEROUS SCLEROSIS: ICD-10-CM

## 2025-02-03 DIAGNOSIS — D30.00 RENAL ONCOCYTOMA, UNSPECIFIED LATERALITY: ICD-10-CM

## 2025-02-03 PROCEDURE — A9585 GADOBUTROL INJECTION: HCPCS | Performed by: UROLOGY

## 2025-02-03 PROCEDURE — 25500020 PHARM REV CODE 255: Performed by: UROLOGY

## 2025-02-03 PROCEDURE — 74183 MRI ABD W/O CNTR FLWD CNTR: CPT | Mod: TC

## 2025-02-03 PROCEDURE — 74183 MRI ABD W/O CNTR FLWD CNTR: CPT | Mod: 26,,, | Performed by: RADIOLOGY

## 2025-02-03 RX ORDER — GADOBUTROL 604.72 MG/ML
10 INJECTION INTRAVENOUS
Status: COMPLETED | OUTPATIENT
Start: 2025-02-03 | End: 2025-02-03

## 2025-02-03 RX ADMIN — GADOBUTROL 10 ML: 604.72 INJECTION INTRAVENOUS at 10:02

## 2025-02-10 ENCOUNTER — TELEPHONE (OUTPATIENT)
Dept: UROLOGY | Facility: CLINIC | Age: 45
End: 2025-02-10
Payer: MEDICARE

## 2025-02-11 ENCOUNTER — TELEPHONE (OUTPATIENT)
Dept: UROLOGY | Facility: CLINIC | Age: 45
End: 2025-02-11
Payer: MEDICARE

## 2025-02-11 NOTE — PROGRESS NOTES
Subjective:      Malorie Taylor is a 44 y.o. female who returns today regarding her     The patient location is: home  The chief complaint leading to consultation is: renal mass    Visit type: audiovisual    Face to Face time with patient: 5 min  25 min minutes of total time spent on the encounter, which includes face to face time and non-face to face time preparing to see the patient (eg, review of tests), Obtaining and/or reviewing separately obtained history, Documenting clinical information in the electronic or other health record, Independently interpreting results (not separately reported) and communicating results to the patient/family/caregiver, or Care coordination (not separately reported).         Each patient to whom he or she provides medical services by telemedicine is:  (1) informed of the relationship between the physician and patient and the respective role of any other health care provider with respect to management of the patient; and (2) notified that he or she may decline to receive medical services by telemedicine and may withdraw from such care at any time.    Notes:       No complaints  No local symptoms  No symptoms of metastatic disease    Present with her mother and father    The following portions of the patient's history were reviewed and updated as appropriate: allergies, current medications, past family history, past medical history, past social history, past surgical history and problem list.    Review of Systems  Pertinent items are noted in HPI.  A comprehensive multipoint review of systems was negative except as otherwise stated in the HPI.    Past Medical History:   Diagnosis Date    Allergy     Learning disability     approximate age 8-10    Obesity 10/31/2015    Seizures     epilepsy, last seizure teenage years     Past Surgical History:   Procedure Laterality Date    BIOPSY, WITH CT GUIDANCE Left 1/18/2023    Procedure: RENAL MASS BIOPSY, WITH CT GUIDANCE;  Surgeon: Luis Eduardo  FERNANDO Nassar MD;  Location: [facility] CATH LAB;  Service: Radiology;  Laterality: Left;    ROBOT-ASSISTED LAPAROSCOPIC PARTIAL NEPHRECTOMY Left 4/11/2024    Procedure: ROBOTIC NEPHRECTOMY, PARTIAL;  Surgeon: Maxx Thomas MD;  Location: [facility] OR;  Service: Urology;  Laterality: Left;       Review of patient's allergies indicates:   Allergen Reactions    Phenobarbital      lethargy          Objective:   Vitals: There were no vitals taken for this visit.    Physical Exam   General: alert and oriented, no acute distress  Respiratory: Symmetric expansion, non-labored breathing  Cardiovascular: no peripheral edema  Abdomen: non distended  Skin: normal coloration and turgor, no rashes, no suspicious skin lesions noted  Neuro: no gross deficits  Psych: normal judgment and insight, normal mood/affect, and non-anxious    Physical Exam    Lab Review   Urinalysis demonstrates no specimen    Lab Results   Component Value Date    WBC 5.93 07/29/2024    HGB 11.8 (L) 07/29/2024    HCT 35.8 (L) 07/29/2024     (H) 07/29/2024     07/29/2024     Lab Results   Component Value Date    CREATININE 0.7 02/03/2025    BUN 12 02/03/2025       Imaging  MRI ABDOMEN W WO CONTRAST     CLINICAL HISTORY:  Tuberous sclerosis sp left partial nephrectomy; RCC and oncocytomas;  Malignant neoplasm of left kidney, except renal pelvis     TECHNIQUE:  Pre and post-contrast multiplanar multisequence imaging of the abdomen was performed before and after the intravenous administration of  10 mL of Gadavist.     COMPARISON:  MRI abdomen 07/29/2024, 04/03/2024, CT abdomen pelvis 08/25/2023, 11/05/2022, abdominal ultrasound 07/29/2024     FINDINGS:  Sequences are degraded by patient motion artifact.     LOWER THORAX: Unremarkable.     LIVER: Enlarged in size measuring 19.3 cm..  Similar appearance of T2 intermediate intensity lesion in the hepatic dome measuring 1.8 cm.  No new focal hepatic lesions.     BILIARY: Normal gallbladder. No biliary ductal  dilatation.     SPLEEN: No splenomegaly.     PANCREAS: No focal masses or ductal dilatation.     ADRENALS: No adrenal nodules.     KIDNEYS/URETERS: Stable postoperative changes of partial left nephrectomy (series 4, image 21).  No new abnormal enhancement in the nephrectomy bed.     Similar number and distribution of bilateral renal masses.  Interval enlargement of the dominant enhancing left renal mass, now measuring 3.1 cm (series 13, image 48), previously measuring 2.4 cm.     The enhancing inferior right renal lesion measuring 1.3 cm (series 13, image 58) (previously 1.2 cm) and enhancing left upper renal lesion measuring 2.0 cm (coronal series 16, image 95) (previously 1.9 cm measured retrospectively) appear similar compared to prior, noting significant motion artifact which limits evaluation.  No new renal lesions.     PERITONEUM / RETROPERITONEUM: No upper abdominal free fluid.     LYMPH NODES: No upper abdominal lymphadenopathy.     VESSELS: Unremarkable.     GI TRACT: No distention or wall thickening.     BONES AND SOFT TISSUES: No marrow replacing lesions.     Impression:     Stable postoperative changes of partial left nephrectomy for renal cell carcinoma.  No evidence of recurrent lesion in the nephrectomy site.     Interval enlargement of the enhancing left renal midpole mass concerning for progressing RCC.     Right lower pole and left upper pole enhancing lesions appear similar to prior, however postcontrast images are significantly degraded secondary to motion artifact.  Renal mass protocol CT could provide better evaluation.     Stable hepatic dome lesion compared to CT 2022.  Attention on follow-up.     This report was flagged in Epic as abnormal.     Electronically signed by resident: Matthew Mcduffie  Date:                                            02/03/2025  Time:                                           10:03     Electronically signed by:Boyd Mcduffie MD  Date:                                             02/03/2025  Time:                                           13:19          Assessment and Plan:   Renal cell carcinoma of left kidney ELIZABETH   Grade 2 pT1b cN0 cM0   Sp robotic partial nephrectomy; no recurrence at that location  Will monitor +margin with radiographic surveillance    Left renal mass (different location than the previously resected tumor)  Will continue observation until lesion reaches 4cm  RTC 6 months with CT renal protocol and BMP    Renal oncocytoma, unspecified laterality  As above  Consider everolimus if there is recurrence/progression     Tuberous sclerosis  Per Heme/onc, 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

## 2025-02-11 NOTE — TELEPHONE ENCOUNTER
Rescheduled appointment w/patient's mother.  ----- Message from Jennifer sent at 2/11/2025  9:42 AM CST -----  [id number]  ----- Message -----  From: Keila Vences  Sent: 2/11/2025   9:15 AM CST  To: [facility] Staff    Type:  Patient Returning Call    Who Called:     Who Left Message for Patient:     Does the patient know what this is regarding?: missed call     Best Call Back Number:   [phone] / disha /      Additional Information:

## 2025-02-12 ENCOUNTER — OFFICE VISIT (OUTPATIENT)
Dept: UROLOGY | Facility: CLINIC | Age: 45
End: 2025-02-12
Payer: MEDICARE

## 2025-02-12 ENCOUNTER — TELEPHONE (OUTPATIENT)
Dept: UROLOGY | Facility: CLINIC | Age: 45
End: 2025-02-12

## 2025-02-12 DIAGNOSIS — C64.2 RENAL CELL CARCINOMA OF LEFT KIDNEY: Primary | ICD-10-CM

## 2025-02-12 NOTE — TELEPHONE ENCOUNTER
----- Message from Maxx Thomas MD sent at 2/12/2025  9:02 AM CST -----  BMP and CT renal protocol in 6 months then see Dr Thomas  Thanks!

## 2025-02-13 ENCOUNTER — OFFICE VISIT (OUTPATIENT)
Dept: PRIMARY CARE CLINIC | Facility: CLINIC | Age: 45
End: 2025-02-13
Payer: MEDICARE

## 2025-02-13 ENCOUNTER — TELEPHONE (OUTPATIENT)
Dept: PRIMARY CARE CLINIC | Facility: CLINIC | Age: 45
End: 2025-02-13

## 2025-02-13 ENCOUNTER — TELEPHONE (OUTPATIENT)
Dept: HEPATOLOGY | Facility: CLINIC | Age: 45
End: 2025-02-13
Payer: MEDICARE

## 2025-02-13 ENCOUNTER — LAB VISIT (OUTPATIENT)
Dept: LAB | Facility: HOSPITAL | Age: 45
End: 2025-02-13
Attending: STUDENT IN AN ORGANIZED HEALTH CARE EDUCATION/TRAINING PROGRAM
Payer: MEDICARE

## 2025-02-13 VITALS
BODY MASS INDEX: 41.46 KG/M2 | DIASTOLIC BLOOD PRESSURE: 80 MMHG | OXYGEN SATURATION: 98 % | HEART RATE: 84 BPM | SYSTOLIC BLOOD PRESSURE: 134 MMHG | WEIGHT: 264.13 LBS | HEIGHT: 67 IN

## 2025-02-13 DIAGNOSIS — C64.2 RENAL CELL CARCINOMA OF LEFT KIDNEY: ICD-10-CM

## 2025-02-13 DIAGNOSIS — Z79.899 OTHER LONG TERM (CURRENT) DRUG THERAPY: ICD-10-CM

## 2025-02-13 DIAGNOSIS — D30.00 RENAL ONCOCYTOMA, UNSPECIFIED LATERALITY: ICD-10-CM

## 2025-02-13 DIAGNOSIS — D75.89 MACROCYTOSIS: ICD-10-CM

## 2025-02-13 DIAGNOSIS — E87.6 HYPOKALEMIA: ICD-10-CM

## 2025-02-13 DIAGNOSIS — Z90.5 H/O LEFT NEPHRECTOMY: ICD-10-CM

## 2025-02-13 DIAGNOSIS — Z12.12 ENCOUNTER FOR COLORECTAL CANCER SCREENING: ICD-10-CM

## 2025-02-13 DIAGNOSIS — D64.9 ANEMIA, UNSPECIFIED TYPE: ICD-10-CM

## 2025-02-13 DIAGNOSIS — Z00.00 ANNUAL PHYSICAL EXAM: ICD-10-CM

## 2025-02-13 DIAGNOSIS — R91.8 MULTIPLE LUNG NODULES: ICD-10-CM

## 2025-02-13 DIAGNOSIS — G40.909 SEIZURE DISORDER: ICD-10-CM

## 2025-02-13 DIAGNOSIS — Z00.00 ANNUAL PHYSICAL EXAM: Primary | ICD-10-CM

## 2025-02-13 DIAGNOSIS — R74.8 HIGH ALKALINE PHOSPHATASE: ICD-10-CM

## 2025-02-13 DIAGNOSIS — Q85.1 TUBEROUS SCLEROSIS: ICD-10-CM

## 2025-02-13 DIAGNOSIS — Z12.11 ENCOUNTER FOR COLORECTAL CANCER SCREENING: ICD-10-CM

## 2025-02-13 DIAGNOSIS — R74.8 HIGH ALKALINE PHOSPHATASE: Primary | ICD-10-CM

## 2025-02-13 DIAGNOSIS — K76.0 FATTY LIVER: ICD-10-CM

## 2025-02-13 DIAGNOSIS — K76.9 LIVER LESION: ICD-10-CM

## 2025-02-13 LAB
25(OH)D3+25(OH)D2 SERPL-MCNC: 41 NG/ML (ref 30–96)
ALBUMIN SERPL BCP-MCNC: 3.3 G/DL (ref 3.5–5.2)
ALP SERPL-CCNC: 143 U/L (ref 40–150)
ALT SERPL W/O P-5'-P-CCNC: 28 U/L (ref 10–44)
ANION GAP SERPL CALC-SCNC: 10 MMOL/L (ref 8–16)
AST SERPL-CCNC: 29 U/L (ref 10–40)
BASOPHILS # BLD AUTO: 0.02 K/UL (ref 0–0.2)
BASOPHILS NFR BLD: 0.3 % (ref 0–1.9)
BILIRUB SERPL-MCNC: 0.2 MG/DL (ref 0.1–1)
BUN SERPL-MCNC: 10 MG/DL (ref 6–20)
CALCIUM SERPL-MCNC: 9.1 MG/DL (ref 8.7–10.5)
CHLORIDE SERPL-SCNC: 106 MMOL/L (ref 95–110)
CHOLEST SERPL-MCNC: 222 MG/DL (ref 120–199)
CHOLEST/HDLC SERPL: 4 {RATIO} (ref 2–5)
CO2 SERPL-SCNC: 21 MMOL/L (ref 23–29)
CREAT SERPL-MCNC: 0.7 MG/DL (ref 0.5–1.4)
DIFFERENTIAL METHOD BLD: ABNORMAL
EOSINOPHIL # BLD AUTO: 0.1 K/UL (ref 0–0.5)
EOSINOPHIL NFR BLD: 1.6 % (ref 0–8)
ERYTHROCYTE [DISTWIDTH] IN BLOOD BY AUTOMATED COUNT: 12.7 % (ref 11.5–14.5)
EST. GFR  (NO RACE VARIABLE): >60 ML/MIN/1.73 M^2
ESTIMATED AVG GLUCOSE: 94 MG/DL (ref 68–131)
FERRITIN SERPL-MCNC: 376 NG/ML (ref 20–300)
FOLATE SERPL-MCNC: 17.2 NG/ML (ref 4–24)
GGT SERPL-CCNC: 60 U/L (ref 8–55)
GLUCOSE SERPL-MCNC: 96 MG/DL (ref 70–110)
HBA1C MFR BLD: 4.9 % (ref 4–5.6)
HCT VFR BLD AUTO: 34.7 % (ref 37–48.5)
HDLC SERPL-MCNC: 56 MG/DL (ref 40–75)
HDLC SERPL: 25.2 % (ref 20–50)
HGB BLD-MCNC: 11.5 G/DL (ref 12–16)
IMM GRANULOCYTES # BLD AUTO: 0.03 K/UL (ref 0–0.04)
IMM GRANULOCYTES NFR BLD AUTO: 0.4 % (ref 0–0.5)
IRON SERPL-MCNC: 49 UG/DL (ref 30–160)
LDLC SERPL CALC-MCNC: 132.4 MG/DL (ref 63–159)
LYMPHOCYTES # BLD AUTO: 2 K/UL (ref 1–4.8)
LYMPHOCYTES NFR BLD: 27.9 % (ref 18–48)
MCH RBC QN AUTO: 34.4 PG (ref 27–31)
MCHC RBC AUTO-ENTMCNC: 33.1 G/DL (ref 32–36)
MCV RBC AUTO: 104 FL (ref 82–98)
MONOCYTES # BLD AUTO: 0.6 K/UL (ref 0.3–1)
MONOCYTES NFR BLD: 8.4 % (ref 4–15)
NEUTROPHILS # BLD AUTO: 4.3 K/UL (ref 1.8–7.7)
NEUTROPHILS NFR BLD: 61.4 % (ref 38–73)
NONHDLC SERPL-MCNC: 166 MG/DL
NRBC BLD-RTO: 0 /100 WBC
PLATELET # BLD AUTO: 322 K/UL (ref 150–450)
PMV BLD AUTO: 9.2 FL (ref 9.2–12.9)
POTASSIUM SERPL-SCNC: 3.6 MMOL/L (ref 3.5–5.1)
PROT SERPL-MCNC: 8 G/DL (ref 6–8.4)
RBC # BLD AUTO: 3.34 M/UL (ref 4–5.4)
SATURATED IRON: 19 % (ref 20–50)
SODIUM SERPL-SCNC: 137 MMOL/L (ref 136–145)
TOTAL IRON BINDING CAPACITY: 259 UG/DL (ref 250–450)
TRANSFERRIN SERPL-MCNC: 175 MG/DL (ref 200–375)
TRIGL SERPL-MCNC: 168 MG/DL (ref 30–150)
TSH SERPL DL<=0.005 MIU/L-ACNC: 1.94 UIU/ML (ref 0.4–4)
VIT B12 SERPL-MCNC: 665 PG/ML (ref 210–950)
WBC # BLD AUTO: 7.05 K/UL (ref 3.9–12.7)

## 2025-02-13 PROCEDURE — 82306 VITAMIN D 25 HYDROXY: CPT | Performed by: STUDENT IN AN ORGANIZED HEALTH CARE EDUCATION/TRAINING PROGRAM

## 2025-02-13 PROCEDURE — 82746 ASSAY OF FOLIC ACID SERUM: CPT | Performed by: STUDENT IN AN ORGANIZED HEALTH CARE EDUCATION/TRAINING PROGRAM

## 2025-02-13 PROCEDURE — 82607 VITAMIN B-12: CPT | Performed by: STUDENT IN AN ORGANIZED HEALTH CARE EDUCATION/TRAINING PROGRAM

## 2025-02-13 PROCEDURE — 83036 HEMOGLOBIN GLYCOSYLATED A1C: CPT | Performed by: STUDENT IN AN ORGANIZED HEALTH CARE EDUCATION/TRAINING PROGRAM

## 2025-02-13 PROCEDURE — 80053 COMPREHEN METABOLIC PANEL: CPT | Performed by: STUDENT IN AN ORGANIZED HEALTH CARE EDUCATION/TRAINING PROGRAM

## 2025-02-13 PROCEDURE — 84443 ASSAY THYROID STIM HORMONE: CPT | Performed by: STUDENT IN AN ORGANIZED HEALTH CARE EDUCATION/TRAINING PROGRAM

## 2025-02-13 PROCEDURE — 82728 ASSAY OF FERRITIN: CPT | Performed by: STUDENT IN AN ORGANIZED HEALTH CARE EDUCATION/TRAINING PROGRAM

## 2025-02-13 PROCEDURE — 80061 LIPID PANEL: CPT | Performed by: STUDENT IN AN ORGANIZED HEALTH CARE EDUCATION/TRAINING PROGRAM

## 2025-02-13 PROCEDURE — 82977 ASSAY OF GGT: CPT | Performed by: STUDENT IN AN ORGANIZED HEALTH CARE EDUCATION/TRAINING PROGRAM

## 2025-02-13 PROCEDURE — 99999 PR PBB SHADOW E&M-EST. PATIENT-LVL V: CPT | Mod: PBBFAC,,, | Performed by: STUDENT IN AN ORGANIZED HEALTH CARE EDUCATION/TRAINING PROGRAM

## 2025-02-13 PROCEDURE — 36415 COLL VENOUS BLD VENIPUNCTURE: CPT | Mod: PN | Performed by: STUDENT IN AN ORGANIZED HEALTH CARE EDUCATION/TRAINING PROGRAM

## 2025-02-13 PROCEDURE — 84466 ASSAY OF TRANSFERRIN: CPT | Performed by: STUDENT IN AN ORGANIZED HEALTH CARE EDUCATION/TRAINING PROGRAM

## 2025-02-13 PROCEDURE — 85025 COMPLETE CBC W/AUTO DIFF WBC: CPT | Performed by: STUDENT IN AN ORGANIZED HEALTH CARE EDUCATION/TRAINING PROGRAM

## 2025-02-13 NOTE — TELEPHONE ENCOUNTER
----- Message from Sharon sent at 2/13/2025 12:34 PM CST -----  Ref in [facility]  ----- Message -----  From: Angelica Phelps  Sent: 2/13/2025  12:30 PM CST  To: [facility] Liver Referral Pool    Please assist with scheduling patient for consult to Hepatology based on orders from Dr.Saira Rico.     Liver lesion [K76.9]

## 2025-02-13 NOTE — TELEPHONE ENCOUNTER
Called the patient to schedule a hepatology consult from referral.  Spoke with Ms. Villegas pt's mom. Scheduled to the next available appt 4/25/2025 with Dr. Rios. Ms. Villegas confirmed and agreed with the schedule.  Reminder letter mailed.

## 2025-02-13 NOTE — TELEPHONE ENCOUNTER
----- Message from Angelica sent at 2/13/2025 12:30 PM CST -----  Patient's mom states that patient is to have a mail in colon screening. Please call to discuss.

## 2025-02-13 NOTE — PROGRESS NOTES
Malorie Taylor  1980        Subjective     Chief Complaint: Annual    History of Present Illness:  Ms. Malorie Taylor is a 44 y.o. female who presents to clinic for annual. Here with mom.     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 Seizures. Sees Neuro. Stable. No recent seizures. Due for f/u.   On Topiramate 200 mg BID, carbamazepine 400 mg BID, and Gabapentin 600 mg TID.    RCC- seeing Urology (Dr. Thomas) + Onc. S/p partial left nephrectomy 4/2024.   Per note, plan is to f/u with a CT A/P in 6m to monitor lesions.    MRI 2/3/25-->  Impression:     Stable postoperative changes of partial left nephrectomy for renal cell carcinoma.  No evidence of recurrent lesion in the nephrectomy site.     Interval enlargement of the enhancing left renal midpole mass concerning for progressing RCC.     Right lower pole and left upper pole enhancing lesions appear similar to prior, however postcontrast images are significantly degraded secondary to motion artifact.  Renal mass protocol CT could provide better evaluation.     Stable hepatic dome lesion compared to CT 2022.  Attention on follow-up.    Liver lesion on MRI. Has not seen Hepatology.  + enlarged liver.   BMI 41.    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 Seeing Dr. Reid in Pulm.  Repeat Ct chest recommended Q1 year. Due now.      Also saw Cards. S/p Echo.    BP Readings from Last 5 Encounters:   02/13/25 134/80   08/20/24 136/84   08/01/24 (!) 159/70   04/29/24 (!) 116/58   04/12/24 (!) 117/55       Flu vaccine today.  Will turn 45 in 3/2025. Prefers cologuard to full colonoscopy.    Review of Systems   Constitutional:  Negative for chills, fever and malaise/fatigue.   Respiratory:  Negative for cough and shortness of breath.    Psychiatric/Behavioral:  The patient is not nervous/anxious.         PAST HISTORY:     Past Medical History:   Diagnosis Date    Allergy     Learning disability     approximate age  8-10    Obesity 10/31/2015    Seizures     epilepsy, last seizure teenage years       Past Surgical History:   Procedure Laterality Date    BIOPSY, WITH CT GUIDANCE Left 1/18/2023    Procedure: RENAL MASS BIOPSY, WITH CT GUIDANCE;  Surgeon: Luis Eduardo Nassar MD;  Location: [facility] CATH LAB;  Service: Radiology;  Laterality: Left;    ROBOT-ASSISTED LAPAROSCOPIC PARTIAL NEPHRECTOMY Left 4/11/2024    Procedure: ROBOTIC NEPHRECTOMY, PARTIAL;  Surgeon: Maxx Thomas MD;  Location: [facility] OR;  Service: Urology;  Laterality: Left;       Family History   Problem Relation Name Age of Onset    Hyperlipidemia Mother Nelly     Hypertension Mother Nelly     Colonic polyp Mother Nelly         a couple in the '80s    Arrhythmia Father [place]     Heart disease Father Dani     Cholecystitis Sister Nataly     Cataracts Maternal Grandmother          in either this relative or this relative's mother    Macular degeneration Maternal Grandmother          WET - AMD    Hyperlipidemia Maternal Grandmother          later in life    Insomnia Maternal Grandmother      Hypertension Maternal Grandmother      Glaucoma Maternal Grandmother      Dementia Maternal Grandmother      Alzheimer's disease Maternal Grandmother      Heart disease Maternal Grandmother      Other Maternal Grandmother          benign breast tumor    Macular degeneration Maternal Grandfather Entiat         DRY - AMD    Vision loss Maternal Grandfather Entiat         corneal guttata (thin)    Hyperlipidemia Maternal Grandfather Entiat         later in life    Alzheimer's disease Maternal Grandfather Entiat     Hypertension Maternal Grandfather Entiat     Heart disease Paternal Grandmother Mehnaz     Emphysema Paternal Grandmother Emhnaz     Cervical cancer Paternal Grandmother Mehnaz     Insomnia Maternal Aunt Robyn     Glaucoma Maternal Aunt Robyn     Coronary artery disease Maternal Aunt Robyn     Sleep apnea Maternal Uncle Edgar     Macular degeneration  Maternal Uncle Edgar     Diabetes Other Ifrah         insulin-dependent    Breast cancer Other Lulu         age at dx unk    Lung cancer Other Mario     Epilepsy Other      Tuberculosis Other      Heart attack Other Kerrie     Heart attack Other Ibrahima     Heart disease Other Ibrahima     Glaucoma Other Aiden     Heart disease Other Aiden     Heart attack Other Laney     Macular degeneration Other Carr     Diverticulitis Other Carr     Diabetes Other Carr     Breast cancer Other Carr 68    Heart disease Other Carr     Kidney disease Other      Dementia Other Jayton     Heart disease Other Cornelia     Heart disease Other Luke     Acne Neg Hx      Eczema Neg Hx      Lupus Neg Hx      Psoriasis Neg Hx      Melanoma Neg Hx      Colon cancer Neg Hx      Ovarian cancer Neg Hx      Kidney cancer Neg Hx           MEDICATIONS & ALLERGIES:     Current Outpatient Medications on File Prior to Visit   Medication Sig    b complex vitamins capsule Take 1 capsule by mouth 3 (three) times a week.    carBAMazepine (TEGRETOL XR) 400 MG Tb12 Take 1 tablet (400 mg total) by mouth 2 (two) times daily.    cranberry 500 mg Cap Take by mouth Daily.    cranberry fruit concentrate (AZO CRANBERRY ORAL) Take by mouth.    ferrous sulfate (FEOSOL) 325 mg (65 mg iron) Tab tablet Take 325 mg by mouth daily with breakfast.    folic acid (FOLVITE) 400 MCG tablet Take 400 mcg by mouth once daily.    gabapentin (NEURONTIN) 600 MG tablet Take 2 tablets (1,200 mg total) by mouth 3 (three) times daily.    hydrocortisone (ANUSOL-HC) 2.5 % rectal cream Place rectally 2 (two) times daily.    metronidazole 0.75% (METROCREAM) 0.75 % Crea AAA face bid    multivitamin capsule Take 1 capsule by mouth once daily.    polyethylene glycol (GLYCOLAX) 17 gram/dose powder Take 17 g by mouth once daily.    topiramate (TOPAMAX) 200 MG Tab Take 1 tablet (200 mg total) by mouth 2 (two) times daily.    [DISCONTINUED] vitamin D 1000 units Tab Take 185 mg by  "mouth once daily.     No current facility-administered medications on file prior to visit.       Review of patient's allergies indicates:   Allergen Reactions    Phenobarbital      lethargy       OBJECTIVE:     Vital Signs:  Vitals:    02/13/25 1038   BP: 134/80   BP Location: Right arm   Patient Position: Sitting   Pulse: 84   SpO2: 98%   Weight: 119.8 kg (264 lb 1.8 oz)   Height: 5' 7" (1.702 m)       Body mass index is 41.37 kg/m².     Physical Exam:  Physical Exam  Vitals and nursing note reviewed.   Constitutional:       General: She is not in acute distress.     Appearance: Normal appearance. She is not ill-appearing, toxic-appearing or diaphoretic.   HENT:      Head: Normocephalic and atraumatic.      Right Ear: Tympanic membrane, ear canal and external ear normal.      Left Ear: Tympanic membrane, ear canal and external ear normal.      Mouth/Throat:      Mouth: Mucous membranes are moist.      Pharynx: No oropharyngeal exudate or posterior oropharyngeal erythema.   Eyes:      General: No scleral icterus.        Right eye: No discharge.         Left eye: No discharge.      Conjunctiva/sclera: Conjunctivae normal.   Cardiovascular:      Rate and Rhythm: Normal rate and regular rhythm.      Pulses: Normal pulses.      Heart sounds: Normal heart sounds. No murmur heard.  Pulmonary:      Effort: Pulmonary effort is normal. No respiratory distress.      Breath sounds: Normal breath sounds. No wheezing.   Musculoskeletal:         General: Normal range of motion.      Cervical back: Normal range of motion and neck supple. No rigidity or tenderness.      Right lower leg: No edema.      Left lower leg: No edema.   Lymphadenopathy:      Cervical: No cervical adenopathy.   Skin:     General: Skin is warm and dry.   Neurological:      Mental Status: She is alert. Mental status is at baseline.      Gait: Gait normal.   Psychiatric:         Mood and Affect: Mood normal.         Behavior: Behavior normal.      " "      Laboratory  Lab Results   Component Value Date    GLU 85 02/03/2025     02/03/2025    K 3.5 02/03/2025     02/03/2025    CO2 22 (L) 02/03/2025    BUN 12 02/03/2025    CREATININE 0.7 02/03/2025    CALCIUM 8.9 02/03/2025     Lab Results   Component Value Date    HGBA1C 4.9 12/08/2023     No results for input(s): "POCTGLUCOSE" in the last 72 hours.        ASSESSMENT & PLAN:   Ms. Malorie Taylor is a 44 y.o. female who was seen today in clinic for annual.     1. Annual physical exam  -     CBC Auto Differential; Future; Expected date: 02/13/2025  -     Comprehensive Metabolic Panel; Future; Expected date: 02/13/2025  -     Hemoglobin A1C; Future; Expected date: 02/13/2025  -     Lipid Panel; Future; Expected date: 02/13/2025  -     TSH; Future; Expected date: 02/13/2025  -     IRON AND TIBC; Future; Expected date: 02/13/2025  -     FERRITIN; Future; Expected date: 02/13/2025  -     VITAMIN B12; Future; Expected date: 02/13/2025  -     FOLATE; Future; Expected date: 02/13/2025  -     GAMMA GT; Future; Expected date: 02/13/2025  -     Vitamin D; Future; Expected date: 02/13/2025    2. Tuberous sclerosis  -     CBC Auto Differential; Future; Expected date: 02/13/2025  -     Comprehensive Metabolic Panel; Future; Expected date: 02/13/2025  -     Hemoglobin A1C; Future; Expected date: 02/13/2025  -     Lipid Panel; Future; Expected date: 02/13/2025  -     TSH; Future; Expected date: 02/13/2025  -     IRON AND TIBC; Future; Expected date: 02/13/2025  -     FERRITIN; Future; Expected date: 02/13/2025  -     VITAMIN B12; Future; Expected date: 02/13/2025  -     FOLATE; Future; Expected date: 02/13/2025  -     GAMMA GT; Future; Expected date: 02/13/2025  -     Vitamin D; Future; Expected date: 02/13/2025    3. Renal cell carcinoma of left kidney  -     CBC Auto Differential; Future; Expected date: 02/13/2025  -     Comprehensive Metabolic Panel; Future; Expected date: 02/13/2025  -     Hemoglobin A1C; " Future; Expected date: 02/13/2025  -     Lipid Panel; Future; Expected date: 02/13/2025  -     TSH; Future; Expected date: 02/13/2025  -     IRON AND TIBC; Future; Expected date: 02/13/2025  -     FERRITIN; Future; Expected date: 02/13/2025  -     VITAMIN B12; Future; Expected date: 02/13/2025  -     FOLATE; Future; Expected date: 02/13/2025  -     GAMMA GT; Future; Expected date: 02/13/2025  -     Vitamin D; Future; Expected date: 02/13/2025    4. H/O left nephrectomy  -     CBC Auto Differential; Future; Expected date: 02/13/2025  -     Comprehensive Metabolic Panel; Future; Expected date: 02/13/2025  -     Hemoglobin A1C; Future; Expected date: 02/13/2025  -     Lipid Panel; Future; Expected date: 02/13/2025  -     TSH; Future; Expected date: 02/13/2025  -     IRON AND TIBC; Future; Expected date: 02/13/2025  -     FERRITIN; Future; Expected date: 02/13/2025  -     VITAMIN B12; Future; Expected date: 02/13/2025  -     FOLATE; Future; Expected date: 02/13/2025  -     GAMMA GT; Future; Expected date: 02/13/2025  -     Vitamin D; Future; Expected date: 02/13/2025    5. Renal oncocytoma, unspecified laterality  -     CBC Auto Differential; Future; Expected date: 02/13/2025  -     Comprehensive Metabolic Panel; Future; Expected date: 02/13/2025  -     Hemoglobin A1C; Future; Expected date: 02/13/2025  -     Lipid Panel; Future; Expected date: 02/13/2025  -     TSH; Future; Expected date: 02/13/2025  -     IRON AND TIBC; Future; Expected date: 02/13/2025  -     FERRITIN; Future; Expected date: 02/13/2025  -     VITAMIN B12; Future; Expected date: 02/13/2025  -     FOLATE; Future; Expected date: 02/13/2025  -     GAMMA GT; Future; Expected date: 02/13/2025  -     Vitamin D; Future; Expected date: 02/13/2025    6. Liver lesion  -     Ambulatory referral/consult to Hepatology; Future; Expected date: 02/20/2025  -     CBC Auto Differential; Future; Expected date: 02/13/2025  -     Comprehensive Metabolic Panel; Future;  Expected date: 02/13/2025  -     Hemoglobin A1C; Future; Expected date: 02/13/2025  -     Lipid Panel; Future; Expected date: 02/13/2025  -     TSH; Future; Expected date: 02/13/2025  -     IRON AND TIBC; Future; Expected date: 02/13/2025  -     FERRITIN; Future; Expected date: 02/13/2025  -     VITAMIN B12; Future; Expected date: 02/13/2025  -     FOLATE; Future; Expected date: 02/13/2025  -     GAMMA GT; Future; Expected date: 02/13/2025  -     Vitamin D; Future; Expected date: 02/13/2025    7. Fatty liver  -     CBC Auto Differential; Future; Expected date: 02/13/2025  -     Comprehensive Metabolic Panel; Future; Expected date: 02/13/2025  -     Hemoglobin A1C; Future; Expected date: 02/13/2025  -     Lipid Panel; Future; Expected date: 02/13/2025  -     TSH; Future; Expected date: 02/13/2025  -     IRON AND TIBC; Future; Expected date: 02/13/2025  -     FERRITIN; Future; Expected date: 02/13/2025  -     VITAMIN B12; Future; Expected date: 02/13/2025  -     FOLATE; Future; Expected date: 02/13/2025  -     GAMMA GT; Future; Expected date: 02/13/2025  -     Vitamin D; Future; Expected date: 02/13/2025    8. Multiple lung nodules  -     CT Chest Without Contrast; Future; Expected date: 02/13/2025  -     CBC Auto Differential; Future; Expected date: 02/13/2025  -     Comprehensive Metabolic Panel; Future; Expected date: 02/13/2025  -     Hemoglobin A1C; Future; Expected date: 02/13/2025  -     Lipid Panel; Future; Expected date: 02/13/2025  -     TSH; Future; Expected date: 02/13/2025  -     IRON AND TIBC; Future; Expected date: 02/13/2025  -     FERRITIN; Future; Expected date: 02/13/2025  -     VITAMIN B12; Future; Expected date: 02/13/2025  -     FOLATE; Future; Expected date: 02/13/2025  -     GAMMA GT; Future; Expected date: 02/13/2025  -     Vitamin D; Future; Expected date: 02/13/2025    9. Seizure disorder  -     CBC Auto Differential; Future; Expected date: 02/13/2025  -     Comprehensive Metabolic Panel;  Future; Expected date: 02/13/2025  -     Hemoglobin A1C; Future; Expected date: 02/13/2025  -     Lipid Panel; Future; Expected date: 02/13/2025  -     TSH; Future; Expected date: 02/13/2025  -     IRON AND TIBC; Future; Expected date: 02/13/2025  -     FERRITIN; Future; Expected date: 02/13/2025  -     VITAMIN B12; Future; Expected date: 02/13/2025  -     FOLATE; Future; Expected date: 02/13/2025  -     GAMMA GT; Future; Expected date: 02/13/2025  -     Vitamin D; Future; Expected date: 02/13/2025    10. Hypokalemia  -     CBC Auto Differential; Future; Expected date: 02/13/2025  -     Comprehensive Metabolic Panel; Future; Expected date: 02/13/2025  -     Hemoglobin A1C; Future; Expected date: 02/13/2025  -     Lipid Panel; Future; Expected date: 02/13/2025  -     TSH; Future; Expected date: 02/13/2025  -     IRON AND TIBC; Future; Expected date: 02/13/2025  -     FERRITIN; Future; Expected date: 02/13/2025  -     VITAMIN B12; Future; Expected date: 02/13/2025  -     FOLATE; Future; Expected date: 02/13/2025  -     GAMMA GT; Future; Expected date: 02/13/2025  -     Vitamin D; Future; Expected date: 02/13/2025    11. Anemia, unspecified type    12. Macrocytosis  -     CBC Auto Differential; Future; Expected date: 02/13/2025  -     Comprehensive Metabolic Panel; Future; Expected date: 02/13/2025  -     Hemoglobin A1C; Future; Expected date: 02/13/2025  -     Lipid Panel; Future; Expected date: 02/13/2025  -     TSH; Future; Expected date: 02/13/2025  -     IRON AND TIBC; Future; Expected date: 02/13/2025  -     FERRITIN; Future; Expected date: 02/13/2025  -     VITAMIN B12; Future; Expected date: 02/13/2025  -     FOLATE; Future; Expected date: 02/13/2025  -     GAMMA GT; Future; Expected date: 02/13/2025  -     Vitamin D; Future; Expected date: 02/13/2025    13. High alkaline phosphatase  -     CBC Auto Differential; Future; Expected date: 02/13/2025  -     Comprehensive Metabolic Panel; Future; Expected date:  02/13/2025  -     Hemoglobin A1C; Future; Expected date: 02/13/2025  -     Lipid Panel; Future; Expected date: 02/13/2025  -     TSH; Future; Expected date: 02/13/2025  -     IRON AND TIBC; Future; Expected date: 02/13/2025  -     FERRITIN; Future; Expected date: 02/13/2025  -     VITAMIN B12; Future; Expected date: 02/13/2025  -     FOLATE; Future; Expected date: 02/13/2025  -     GAMMA GT; Future; Expected date: 02/13/2025  -     Vitamin D; Future; Expected date: 02/13/2025    14. Encounter for colorectal cancer screening  -     Ambulatory referral/consult to Endo Procedure ; Future; Expected date: 03/13/2025    15. Other long term (current) drug therapy  -     Hemoglobin A1C; Future; Expected date: 02/13/2025  -     TSH; Future; Expected date: 02/13/2025  -     VITAMIN B12; Future; Expected date: 02/13/2025  -     FOLATE; Future; Expected date: 02/13/2025  -     Vitamin D; Future; Expected date: 02/13/2025           Martita Rico MD

## 2025-02-20 ENCOUNTER — HOSPITAL ENCOUNTER (OUTPATIENT)
Dept: RADIOLOGY | Facility: HOSPITAL | Age: 45
Discharge: HOME | End: 2025-02-20
Attending: STUDENT IN AN ORGANIZED HEALTH CARE EDUCATION/TRAINING PROGRAM
Payer: MEDICARE

## 2025-02-20 DIAGNOSIS — R91.8 MULTIPLE LUNG NODULES: ICD-10-CM

## 2025-02-20 PROCEDURE — 71250 CT THORAX DX C-: CPT | Mod: TC

## 2025-02-21 ENCOUNTER — TELEPHONE (OUTPATIENT)
Dept: PULMONOLOGY | Facility: CLINIC | Age: 45
End: 2025-02-21
Payer: MEDICARE

## 2025-02-21 NOTE — TELEPHONE ENCOUNTER
Spoke to Nelly pt's mother regarding  Ct scan results, I advised pts mother  Nelly that I will forward her message to Dr Reid. Miss Villegas verbalized understanding.

## 2025-03-19 DIAGNOSIS — R91.8 MULTIPLE LUNG NODULES: Primary | ICD-10-CM

## 2025-03-19 DIAGNOSIS — Z90.5 H/O LEFT NEPHRECTOMY: ICD-10-CM

## 2025-03-19 DIAGNOSIS — D30.00 RENAL ONCOCYTOMA, UNSPECIFIED LATERALITY: ICD-10-CM

## 2025-03-19 NOTE — PROGRESS NOTES
Spoke to pt's mom in regards to her labs, CT imaging, cologuard.   Following with Heme/Onc, Urology, Pulmonology.   Hepatology appt coming up.   CT w/7mm lung nodule. Repeat 3m-6m advised. Since they are coming in from [place], would prefer to group appts together if possible.

## 2025-04-03 ENCOUNTER — TELEPHONE (OUTPATIENT)
Dept: ENDOSCOPY | Facility: HOSPITAL | Age: 45
End: 2025-04-03

## 2025-04-03 ENCOUNTER — CLINICAL SUPPORT (OUTPATIENT)
Dept: ENDOSCOPY | Facility: HOSPITAL | Age: 45
End: 2025-04-03
Payer: MEDICARE

## 2025-04-03 DIAGNOSIS — Z12.12 ENCOUNTER FOR COLORECTAL CANCER SCREENING: ICD-10-CM

## 2025-04-03 DIAGNOSIS — Z12.11 ENCOUNTER FOR COLORECTAL CANCER SCREENING: ICD-10-CM

## 2025-04-03 NOTE — PLAN OF CARE
Patient's mother refused to schedule patient colonoscopy because they did the cologuard and it came back negative. No scheduling was done. I told her if anything comes up to call us back.

## 2025-04-25 ENCOUNTER — RESULTS FOLLOW-UP (OUTPATIENT)
Dept: HEPATOLOGY | Facility: CLINIC | Age: 45
End: 2025-04-25

## 2025-04-25 ENCOUNTER — OFFICE VISIT (OUTPATIENT)
Dept: HEPATOLOGY | Facility: CLINIC | Age: 45
End: 2025-04-25
Payer: MEDICARE

## 2025-04-25 ENCOUNTER — LAB VISIT (OUTPATIENT)
Dept: LAB | Facility: HOSPITAL | Age: 45
End: 2025-04-25
Attending: INTERNAL MEDICINE
Payer: MEDICARE

## 2025-04-25 ENCOUNTER — PROCEDURE VISIT (OUTPATIENT)
Dept: HEPATOLOGY | Facility: CLINIC | Age: 45
End: 2025-04-25
Payer: MEDICARE

## 2025-04-25 VITALS
SYSTOLIC BLOOD PRESSURE: 132 MMHG | WEIGHT: 259.5 LBS | HEART RATE: 73 BPM | TEMPERATURE: 99 F | BODY MASS INDEX: 40.73 KG/M2 | DIASTOLIC BLOOD PRESSURE: 77 MMHG | HEIGHT: 67 IN | OXYGEN SATURATION: 100 %

## 2025-04-25 DIAGNOSIS — R97.8 OTHER ABNORMAL TUMOR MARKERS: ICD-10-CM

## 2025-04-25 DIAGNOSIS — K76.9 LIVER LESION: ICD-10-CM

## 2025-04-25 DIAGNOSIS — R16.0 HEPATOMEGALY: ICD-10-CM

## 2025-04-25 DIAGNOSIS — R93.2 ABNORMAL FINDING ON IMAGING OF LIVER: ICD-10-CM

## 2025-04-25 LAB
AFP SERPL-MCNC: <2 NG/ML
ALBUMIN SERPL BCP-MCNC: 3.3 G/DL (ref 3.5–5.2)
ALP SERPL-CCNC: 139 UNIT/L (ref 40–150)
ALT SERPL W/O P-5'-P-CCNC: 19 UNIT/L (ref 10–44)
AST SERPL-CCNC: 16 UNIT/L (ref 11–45)
BILIRUB DIRECT SERPL-MCNC: 0.1 MG/DL (ref 0.1–0.3)
BILIRUB SERPL-MCNC: 0.2 MG/DL (ref 0.1–1)
CANCER AG19-9 SERPL-ACNC: 4.9 U/ML
INR PPP: 1 (ref 0.8–1.2)
PROT SERPL-MCNC: 8.1 GM/DL (ref 6–8.4)
PROTHROMBIN TIME: 10.8 SECONDS (ref 9–12.5)

## 2025-04-25 PROCEDURE — 99999 PR PBB SHADOW E&M-EST. PATIENT-LVL IV: CPT | Mod: PBBFAC,,, | Performed by: INTERNAL MEDICINE

## 2025-04-25 PROCEDURE — 85610 PROTHROMBIN TIME: CPT

## 2025-04-25 PROCEDURE — 86301 IMMUNOASSAY TUMOR CA 19-9: CPT

## 2025-04-25 PROCEDURE — 36415 COLL VENOUS BLD VENIPUNCTURE: CPT

## 2025-04-25 PROCEDURE — 82105 ALPHA-FETOPROTEIN SERUM: CPT

## 2025-04-25 PROCEDURE — 84075 ASSAY ALKALINE PHOSPHATASE: CPT

## 2025-04-25 NOTE — PROCEDURES
FibroScan Transplant Hepatology    Date/Time: 4/25/2025 10:45 AM    Performed by: Jamel Kahn MA  Authorized by: Freida Rios MD    Diagnosis:  NAFLD    Probe:  XL    Universal Protocol: Patient's identity, procedure and site were verified, confirmatory pause was performed.  Discussed procedure including risks and potential complications.  Questions answered.  Patient verbalizes understanding and wishes to proceed with VCTE.     Procedure: After providing explanations of the procedure, patient was placed in the supine position with right arm in maximum abduction to allow optimal exposure of right lateral abdomen.  Patient was briefly assessed, Testing was performed in the mid-axillary location, 50Hz Shear Wave pulses were applied and the resulting Shear Wave and Propagation Speed detected with a 3.5 MHz ultrasonic signal, using the FibroScan probe, Skin to liver capsule distance and liver parenchyma were accessed during the entire examination with the FibroScan probe, Patient was instructed to breathe normally and to abstain from sudden movements during the procedure, allowing for random measurements of liver stiffness. At least 10 Shear Waves were produced, Individual measurements of each Shear Wave were calculated.  Patient tolerated the procedure well with no complications.  Meets discharge criteria as was dismissed.  Rates pain 0 out of 10.  Patient will follow up with ordering provider to review results.    Findings  Median liver stiffness score:  4.7  CAP Reading: dB/m:  375    IQR/med %:  12  Interpretation  Fibrosis interpretation is based on medial liver stiffness - Kilopascal (kPa).    Fibrosis Stage:  F 0-1  Steatosis interpretation is based on controlled attenuation parameter - (dB/m).    Steatosis Grade:  S3

## 2025-04-25 NOTE — PROGRESS NOTES
Hepatology Consult Note    Referring provider: Dr. Martita Rico  PCP: Martita Rico MD    Chief complaint: liver lesion     HPI:  Malorie Taylor is a 45 y.o. female with liver lesion, obesity (BMI 40), RCC s/p robotic partial nephrectomy (April 2024), who was referred to Hepatology Clinic for liver lesion. Accompanied by mom, Emily, who provides HPI.    Regarding risk factors for liver disease, the patient denies prior history of EtOH abuse, illicit drug use, blood transfusions, prior tattoos. MASLD risk factors: obesity. Denies history of DM, HTN, HLD, LOREE, PCOS. Denies family history of liver disease or liver cancer.     The patient denies episodes of jaundice. The patient denies prior evaluation by hepatologist, liver biopsy.    Denies upper abdominal surgeries.    Past Medical History:   Diagnosis Date    Allergy     Learning disability     approximate age 8-10    Obesity 10/31/2015    Seizures     epilepsy, last seizure teenage years       Past Surgical History:   Procedure Laterality Date    BIOPSY, WITH CT GUIDANCE Left 1/18/2023    Procedure: RENAL MASS BIOPSY, WITH CT GUIDANCE;  Surgeon: Luis Eduardo Nassar MD;  Location: [facility], [place], operated by [facility] CATH LAB;  Service: Radiology;  Laterality: Left;    ROBOT-ASSISTED LAPAROSCOPIC PARTIAL NEPHRECTOMY Left 4/11/2024    Procedure: ROBOTIC NEPHRECTOMY, PARTIAL;  Surgeon: Maxx Thomas MD;  Location: [facility], [place], operated by [facility] OR;  Service: Urology;  Laterality: Left;       Family History   Problem Relation Name Age of Onset    Hyperlipidemia Mother Nelly     Hypertension Mother Nelly     Colonic polyp Mother Nelly         a couple in the '80s    Arrhythmia Father Dani     Heart disease Father Dani     Cholecystitis Sister Nataly     Cataracts Maternal Grandmother          in either this relative or this relative's mother    Macular degeneration Maternal Grandmother          WET - AMD    Hyperlipidemia Maternal Grandmother          later in life    Insomnia Maternal Grandmother       Hypertension Maternal Grandmother      Glaucoma Maternal Grandmother      Dementia Maternal Grandmother      Alzheimer's disease Maternal Grandmother      Heart disease Maternal Grandmother      Other Maternal Grandmother          benign breast tumor    Macular degeneration Maternal Grandfather Suhail         DRY - AMD    Vision loss Maternal Grandfather [place]         corneal guttata (thin)    Hyperlipidemia Maternal Grandfather Suhail         later in life    Alzheimer's disease Maternal Grandfather [place]     Hypertension Maternal Grandfather [place]     Heart disease Paternal Grandmother Mehnaz     Emphysema Paternal Grandmother Mehnaz     Cervical cancer Paternal Grandmother Mehnaz     Insomnia Maternal Aunt Robyn     Glaucoma Maternal Aunt Robyn     Coronary artery disease Maternal Aunt Robyn     Sleep apnea Maternal Uncle Edgar     Macular degeneration Maternal Uncle Edgar     Diabetes Other Ifrah         insulin-dependent    Breast cancer Other Lulu         age at dx unk    Lung cancer Other Mario     Epilepsy Other      Tuberculosis Other      Heart attack Other Kerrie     Heart attack Other Ibrahima     Heart disease Other Ibrahima     Glaucoma Other Aiden     Heart disease Other Aiden     Heart attack Other Laney     Macular degeneration Other Carr     Diverticulitis Other Carr     Diabetes Other Carr     Breast cancer Other Carr 68    Heart disease Other Carr     Kidney disease Other      Dementia Other Waskom     Heart disease Other Cornelia     Heart disease Other Luke     Acne Neg Hx      Eczema Neg Hx      Lupus Neg Hx      Psoriasis Neg Hx      Melanoma Neg Hx      Colon cancer Neg Hx      Ovarian cancer Neg Hx      Kidney cancer Neg Hx         Social History[1]    Current Medications[2]    Review of patient's allergies indicates:   Allergen Reactions    Phenobarbital      lethargy            Vitals:    04/25/25 0930   BP: 132/77   Pulse: 73   Temp: 98.7 °F (37.1 °C)   TempSrc:  "Oral   SpO2: 100%   Weight: 117.7 kg (259 lb 7.7 oz)   Height: 5' 7" (1.702 m)   Body mass index is 40.64 kg/m².    Physical Exam    LABS: I personally reviewed pertinent laboratory findings.    Lab Results   Component Value Date    WBC 7.05 02/13/2025    HGB 11.5 (L) 02/13/2025    HCT 34.7 (L) 02/13/2025     (H) 02/13/2025     02/13/2025       Lab Results   Component Value Date     02/13/2025    K 3.6 02/13/2025     02/13/2025    CO2 21 (L) 02/13/2025    BUN 10 02/13/2025    CREATININE 0.7 02/13/2025    CALCIUM 9.1 02/13/2025    ANIONGAP 10 02/13/2025    ESTGFRAFRICA >60.0 03/02/2022    EGFRNONAA >60.0 03/02/2022       Lab Results   Component Value Date    ALT 28 02/13/2025    AST 29 02/13/2025    GGT 60 (H) 02/13/2025    ALKPHOS 143 02/13/2025    BILITOT 0.2 02/13/2025       Lab Results   Component Value Date    HEPCAB Negative 03/17/2021       No results found for: "ARBEN", "MITOAB", "SMOOTHMUSCAB", "IGG", "CERULOPLSM"     Computed MELD 3.0 unavailable. One or more values for this score either were not found within the given timeframe or did not fit some other criterion.  Computed MELD-Na unavailable. One or more values for this score either were not found within the given timeframe or did not fit some other criterion.       IMAGING: I personally reviewed imaging studies.      Assessment: Malorie Taylor is a 45 y.o. female with liver lesion, obesity (BMI 40), RCC s/p robotic partial nephrectomy (April 2024), who was referred to Hepatology Clinic for liver lesion. Per chart review, liver tests are normal. On MRI abd w/wo contrast (2/3/25), 1.8cm in the hepatic dome stable compared to CT 2022. On MRI abd w/wo contrast (7/29/24), there is hepatomegaly with stable appearing hepatic dome lesion, likely representing a hemangioma.     Suspect benign liver lesion given stability over 3 year period. However, will plan for tumor markers, viral hepatitis panel, non-invasive measures of fibrosis " out of an abundance of caution. Will review imaging in IR conference.    1. Liver lesion    2. Hepatomegaly    3. Abnormal finding on imaging of liver    4. Body mass index (BMI) 40.0-44.9, adult    5. Other abnormal tumor markers      Recommendations:  - LFTs, INR  - AFP, CA 19-9  - HAV IgG, HBsAg, HBsAb, HBc Total Ab, HCV ab, HIV  - A1AT phenotype, ceruloplasmin   - FibroScan   - Review imaging in IR conference     Return to clinic in 3 months.    I have sent communication to the referring physician and/or primary care provider.    Freida Rios MD  Staff Physician  Hepatology and Liver Transplant  [facility]  [facility]             [1]   Social History  Tobacco Use    Smoking status: Never    Smokeless tobacco: Never   Substance Use Topics    Alcohol use: Never    Drug use: Never   [2]   Current Outpatient Medications   Medication Sig Dispense Refill    b complex vitamins capsule Take 1 capsule by mouth 3 (three) times a week.      carBAMazepine (TEGRETOL XR) 400 MG Tb12 Take 1 tablet (400 mg total) by mouth 2 (two) times daily. 180 tablet 3    cranberry 500 mg Cap Take by mouth Daily.      cranberry fruit concentrate (AZO CRANBERRY ORAL) Take by mouth.      ferrous sulfate (FEOSOL) 325 mg (65 mg iron) Tab tablet Take 325 mg by mouth daily with breakfast.      folic acid (FOLVITE) 400 MCG tablet Take 400 mcg by mouth once daily.      gabapentin (NEURONTIN) 600 MG tablet Take 2 tablets (1,200 mg total) by mouth 3 (three) times daily. 540 tablet 3    hydrocortisone (ANUSOL-HC) 2.5 % rectal cream Place rectally 2 (two) times daily. 28 g 1    metronidazole 0.75% (METROCREAM) 0.75 % Crea AAA face bid 45 g 3    multivitamin capsule Take 1 capsule by mouth once daily.      polyethylene glycol (GLYCOLAX) 17 gram/dose powder Take 17 g by mouth once daily. 238 g 0    topiramate (TOPAMAX) 200 MG Tab Take 1 tablet (200 mg total) by mouth 2 (two) times daily. 180 tablet 3      No current facility-administered medications for this visit.

## 2025-05-09 ENCOUNTER — TELEPHONE (OUTPATIENT)
Dept: HEPATOLOGY | Facility: CLINIC | Age: 45
End: 2025-05-09
Payer: MEDICARE

## 2025-05-09 NOTE — TELEPHONE ENCOUNTER
[facility] [place]  [facility]  Cardio-Oncology Consult      Date of Visit:  2/1/2023  Patient Name: Berto Washington  Medical Record Number: [id number]  YOB: 1954   Primary Physician: Julius Smith MD  Referring Oncologist: Dr Montes  Cardiologist: Aminta  Referral date: 1/20/2023  Appointment scheduled: Pending    HISTORY OF PRESENT ILLNESS:  Berto Washington is a 68 year old male who presents to the clinic with the following CV (cardiovascular) history and/or risk factors:    Diabetes  Hypertension  Sleep apnea  Gastric adenocarcinoma  Myelosuppression due to chemo, persistent anemia    Oncologic history:  --3/17/22        surgical pathology, EGD performed in context of iron deficiency - gastric cardia mass biopsy and gastric polyp biopsy - adenocarcinoma, microsatellite stable, HER2 negative (2+ by IHC, nonamplified by fluorescence in Situ hybridization)  --3/23/22        CT chest/abdomen/pelvis with contrast - no evidence of metastatic disease.  Indeterminate 1.5 cm left adrenal nodule.  --3/31/22        CT adrenal glands without contrast - small left adrenal nodule measuring 1.5 cm, with noncontrast attenuation consistent with benign adenoma  --4/1/22          endoscopic ultrasound - hypoechoic mass invading through muscularis propria (T3), measuring 3 cm, no abnormal lymphadenopathy in the celiac, left gastric, gastrohepatic, mattie hepatis, perigastric nor peripancreatic regions  --4/21/22        FLOT chemotherapy, C1D1  --5/4/22          FLOT chemotherapy, C2D1  --5/18/22        FLOT chemotherapy, cycle 3 day 1  --6/1/22          FLOT chemotherapy, cycle 4 day 1  --6/22/22        total gastrectomy, cholecystectomy, omentectomy - no evidence of residual disease, multiple benign lymph nodes (0/20), final postsurgical staging ypT0 ypN0         --8/3/22          FLOT chemotherapy, cycle 5 day 1  --8/17/22        FLOT chemotherapy, cycle 6 day  Submitted for review at liver conference 5/13/2025   1    Chemotherapy Treatment History:  Docetaxel 4/21/22 - 8/17/2022  Fluorouracil 4/21/22 - 8/17/2022  Leucovorin 4/21/22 - 8/17/2022  Oxaliplatin 4/21/22 - 8/17/2022    Radiation Treatment History:  None     Current cardiac medications include:   Furosemide   Atorvastatin  Losartan  Metoprolol     Cardiac Toxicity Risk Assessment  Cardio-Oncology Risk Score  No data recorded    Cardiac Toxicity Risk Assessment Score: 8    Historical Episodes:  None     Presents today for evaluation of decline in ejection fraction. He was diagnosed with stage IIB (cT3 cN0 cM0) gastric adenocarcinoma., Hx robotic converted to open total gastrectomy with Enoc-en-Y reconstruction, omentectomy and cholecystectomy, 6/22/22. Developed lower extremity edema and had an echo done which showed EF drop to 47 % from 57% (6/16/22). He had a normal exercise stress test in 2018.     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 He became short of breath walking from the car to the hospital entrance. In December 15th he retired due to work restraints. CPAP was being repaired and he is not currently using it.  Recent endorses productive clear sputum cough. Last 2-3 days symptoms improved after starting lasix. No chest or chest pressure. No dizziness or LOC. Occasional palpitations, not related to activity or rest. He denies orthopnea, PND. He takes his medications every day, never misses a dose, and has had no problems with them.   He denies fevers, chills, sweats, nausea, vomiting or diarrhea.  He denies bleeding problems, hematochezia or melena. . He takes his medications every day, never misses a dose, and has had no problems with them.       MEDICATIONS:  Current Outpatient Medications   Medication Sig Dispense Refill   • Alum Hydroxide-Mag Carbonate (GAVISCON PO) Take by mouth as needed.     • allopurinol (ZYLOPRIM) 100 MG tablet Take 1 tablet by mouth daily. 90 tablet 1   • atorvastatin (LIPITOR) 20 MG tablet Take 1 tablet by mouth  every evening. 90 tablet 1   • furosemide (LASIX) 20 MG tablet Take 1 tablet by mouth daily. 90 tablet 1   • losartan (COZAAR) 25 MG tablet Take 1 tablet by mouth daily. 90 tablet 1   • metoPROLOL succinate (TOPROL-XL) 25 MG 24 hr tablet Take 1 tablet by mouth daily. Dose increase 11/16/2022 by Dr. Smith 90 tablet 1   • potassium CHLORIDE (KLOR-CON M) 20 MEQ gopi ER tablet Take 1 tablet by mouth in the morning and 1 tablet in the evening. 180 tablet 1   • sucralfate (CARAFATE) 1 GM/10ML suspension Take 10 mLs by mouth 4 times daily. 3780 mL 3   • NEUROPATHY CREAM (black pepper-frankincense-lavender oil 5-2-2 % in cream base) Apply 1 gram to hands and feet 2 times daily (morning and bedtime). If feet are affected, apply to shins and calves as well. Do not use over broken skin. (If adverse effect or sensitivity occurs, discontinue and contact provider.) 60 g 1   • loperamide (IMODIUM) 2 MG capsule Take 1 capsule by mouth in the morning and 1 capsule in the evening. 60 capsule 1   • diphenoxylate-atropine (Lomotil) 2.5-0.025 MG tablet Take 1 tablet by mouth 4 times daily as needed for Diarrhea. 24 tablet 3   • DISPENSE Glucose Meter for New Diabetic Pt 1 each 0     No current facility-administered medications for this visit.       Allergies:  ALLERGIES:   Allergen Reactions   • Erythromycin Nausea & Vomiting        Past Medical History:   Diagnosis Date   • Anemia     iron infusions   • B12 deficiency    • Diabetes mellitus (CMS/HCC)    • Essential (primary) hypertension    • Gastroesophageal reflux disease    • Intestinal infection due to Clostridium difficile 7/11/2022   • Malignant neoplasm (CMS/HCC)     adenocarcinoma - Gastric   • PAD (peripheral artery disease) (CMS/HCC)    • Sinusitis, chronic    • Sleep apnea     CPAP   • Vitamin B12 deficiency 4/13/2022       Past Surgical History:   Procedure Laterality Date   • Anesth,shoulder replacement Right 05/20/2021   • Cholecystectomy  06/22/2022    At time of total  gastrectomy.   • Colonoscopy     • Egd  06/15/2022   • Mediport insertion, single  04/08/2022   • Removal stomach,total  06/22/2022    Robotic to open total gastrectomy, yosef, omentectomy by Dr. Edmond.   • [place] tooth extraction         Family History   Problem Relation Age of Onset   • Cancer Father         prostate       Social History     Tobacco Use   • Smoking status: Never   • Smokeless tobacco: Never   Vaping Use   • Vaping Use: never used   Substance Use Topics   • Alcohol use: Yes     Comment: rarely - maybe once per month   • Drug use: Never       PHYSICAL EXAMINATION:  Vitals:    Visit Vitals  BP (!) 147/79 (BP Location: LUE - Left upper extremity, Patient Position: Sitting)   Pulse 98   Resp 16   Ht 5' 10\" (1.778 m)   Wt 75.3 kg (166 lb)   SpO2 (!) 79%   BMI 23.82 kg/m²   BMI:  Body mass index is 23.82 kg/m².    Constitutional:  pleasant 68 year old male in no acute distress  Skin:  warm, dry, intact, no lesions  HEENT:  normocephalic, atraumatic; oral mucous membranes moist; extraocular movements intact  Neck:  supple; trachea midline; negative hepatojugular reflux;  Cardiovascular:  regular rate and rhythm; normal S1, S2; no murmur; negative S3, S4  Respiratory:  anterior/posterior lung sounds clear to auscultation bilaterally  Abdomen:  normal bowel sounds; abdomen soft, nontender, nondistended; no hepatomegaly  Musculoskeletal/Extremities:  Capillary refill time <3; no clubbing, no cyanosis; trace bilateral lower extremity edema  Neurological:  no focal neurological deficits; speech normal; sensation grossly intact  Psychiatric:  alert and oriented to person, place and time      DIAGNOSTICS:  The following diagnostics were reviewed:    Laboratory:  Recent Labs   Lab 01/31/23  1239 01/19/23  1327 12/13/22  0956 11/21/22  1349 09/28/22  1349   Sodium 142 144 143 144 140   Potassium 3.2* 3.9 3.9 3.6 3.5   BUN 17 15 17 19 13   Creatinine 0.83 0.79 0.90 0.77 0.78   Glomerular Filtration Rate >90 >90  >90 >90 >90   BUN/ Creatinine Ratio 20 19 19 25 17       Recent Labs   Lab 01/19/23  1327   NT-proBNP 2,743*       Recent Labs   Lab 01/31/23  1239 12/13/22  0956 11/21/22  1349 09/28/22  1349 08/31/22  0853 08/22/22  1302 08/02/22  1302 06/22/22  2217 06/20/22  1058 06/17/22  0546 06/16/22  0602 06/15/22  0502   LD, Total 196 137  --  155 202 262*   < >  --    < >  --   --   --    INR  --   --  1.2  --   --   --   --  1.3  --  1.2 1.2 1.2    < > = values in this interval not displayed.       Recent Labs   Lab 01/31/23  1239 12/13/22  0956 11/21/22  1349 09/28/22  1349 09/20/22  1215   WBC 2.4* 2.8* 3.5* 3.0* 4.1*   RBC 4.40* 4.28* 4.19* 4.06* 4.03*   HGB 11.8* 11.6* 11.6* 10.6* 10.4*   HCT 37.3* 36.8* 36.7* 33.4* 33.3*   MCV 84.8 86.0 87.6 82.3 82.6   MCH 26.8 27.1 27.7 26.1 25.8*   MCHC 31.6* 31.5* 31.6* 31.7* 31.2*   RDW-CV 16.8* 13.6 14.7 21.2* 21.3*   * 156 165 164 216       No results for input(s): CHOLESTEROL, TRIGLYCERIDE, HDL, CHOHDL, ER1, CALCLDL, LDLHDL, QVLDL, NONHDL in the last 8765 hours.    Recent Labs   Lab 10/12/22  1417   TSH 0.724       Recent Labs   Lab 12/13/22  0956 06/27/22  0519   Hemoglobin A1C 5.0 5.1       Cardiac:  Ejection Fraction (%)   Date Value   01/19/2023 46   06/16/2022 57     LV End Systolic Longitudinal Strain Global (%)   Date Value   01/19/2023 -13.3     Left Ventricular Internal Dimension in Diastole (cm)   Date Value   01/19/2023 5.349   06/16/2022 4.848     MV E Wave Wilver/E Tissue Wilver Med (unitless)   Date Value   06/16/2022 7.984     Est Right Vent Systolic Pressure by Tricuspid Regurgitation Jet (mmHg)   Date Value   01/19/2023 49.997   06/16/2022 28.3       Recent diagnostic testing:  Echo 1/19/2023  Echo Limited or Follow up without contrast.  Mildly decreased left ventricular systolic function.  Left ventricular ejection fraction; 47 %.  Prominent left ventricular trabeculations visualized.  Abnormal LV Global longitudinal strain -13.3 %.  Moderate pulmonary  hypertension; RVSP 50 mmHg.  Compared to prior, the LVEF has decreased from 58%. The PA pressure has increased.    EKG 9/4/2022  Sinus bradycardia with marked sinus arrhythmia   Low voltage QRS   Nonspecific T wave abnormality   Abnormal ECG   When compared with ECG of 28-AUG-2022 12:12,   QT has lengthened   Confirmed by EVIE DOWNEY MD ([id number]) on 9/4/2022 10:34:49 PM    Echo 6/16/2022  Normal LV size and function, LVEF 58% with no regional wall motion abnormalities and normal wall thickness except for mild basal  septal hypertrophy. Grade I left ventricular diastolic dysfunction.  Mildly dilated RV with normal function. Normal right ventricular systolic pressure 28 mmHg.  Moderately enlarged LA chamber size. Left atrial volume index of 46.0 ml/m².  No significant valve abnormalities.  Mildly dilated ascending aorta, 3.9 cm.  No prior study available for direct comparison.      EXERCISE TREADMILL STRESS test 2/20/2018  IMPRESSION:   1. Clinically, patient complained of mild shortness of breath with   exercise.   2. Patient complains of calf claudication with exercise as well.   3. No ischemic EKG changes noted with exercise.     Holter 1/29/2018  The patient's basic rhythm is sinus with sinus arrhythmia. The average  Heart rate is 69 BPM. The minimum heart rate is 39 BPM. Maximum heart rate 121 BPM.  No atrial fibrillation was seen.  There are 25 supraventricular beats seen, which are isolated. Two pairs.  There are 1288 ventricular beats seen, which represent 1.3% of the QRS  complexes. Ventricular beats are primarily isolated, 42 couplets, 3 bigeminal cycles.        Non-Cardiac:      Problem List:  Patient Active Problem List   Diagnosis   • Gastric adenocarcinoma (CMS/HCC)   • Myelosuppression after chemotherapy   • Vitamin B12 deficiency   • Port-A-Cath in place   • Iron deficiency anemia due to chronic blood loss   • Blood in stool   • Gastric cancer (CMS/HCC)   • Pneumothorax on left   • Diabetic ulcer of  toe of right foot associated with type 2 diabetes mellitus, limited to breakdown of skin (CMS/HCC)   • DM type 2 (diabetes mellitus, type 2) (CMS/HCC)   • Acute postoperative anemia due to greater than expected blood loss   • Postoperative pain   • DM type 2 (diabetes mellitus, type 2) (CMS/HCC)   • Essential hypertension, benign   • MAMIE on CPAP   • Hypercalcemia   • Debility   • CKD (chronic kidney disease) stage 3, GFR 30-59 ml/min (CMS/HCC)   • Intestinal infection due to Clostridium difficile   • Dehydration     ASSESSMENT/PLAN:  Cardio-Oncology:   Cardiotoxicity:  Affected  Heart failure with mildly reduced EF. Etiology unspecified. S/p FLOT chemotherapy.     Cancer Diagnosis: Gastric    Other pertinent diagnoses:    Prostate cyst  Hernia  Diabetes  Hypertension  Sleep apnea  Gastric adenocarcinoma  Myelosuppression due to chemo, persistent anemia    Recommendations:  1. Cardioprotective medical adjustment:  No adjustment to medications at this time. He had EF drop to 58%->47% after FLOT chemotherapy. Appears euvolemic today (recently started on lasix and he self recorded 2L urine output). Considering he has anginal equivalent with CLAY, drop in EF and has multiple risk factors for CAD we will pursue invasive ischemic workup. We will arrange for LHC and RHC after patient's prostate biopsy.     [x] ACEi/ARB/Entresto [x] Beta Blocker [] Statin [] Calcium Channel Blocker [x] Diuretic  [] MRA [] Corlanor  [] Digoxin    [] Hydralazine   [] Nitrate [] SGLT2-I    2. Lifestyle modification:  none  3. Continue surveillance program for  cardio-oncology protocols    Follow-up:   Clinic:  Pending below tests   Tests:  Left and right heart cath     Visit coordinated by:  Janna BATRES.     Patient understands he can return sooner if any issues should arise.     No LOS data to display  This includes pre-charting, chart review, documenting and referring/communicating with other health care professionals.    Patient seen with   Ronen Loza MD  Cardiology Fellow, PGY-V    Cardiology attending note:  I was present with Dr Obrien and I personally participated in the history, physical examination, review of studies, and assessment and plan as documented above.     STEFANIE Kan  Advanced heart failure, M[facility] and transplant cardiology

## 2025-05-09 NOTE — TELEPHONE ENCOUNTER
Patient: Malorie Taylor       MRN: [id number]      : 1980     Age: 45 y.o.  [address]  [place] [address]    Presenting Radiologists:     Providers: Freida Rios MD    Priority of review: Other    Patient Transplant Status: Hepatology    Reason for presentation: Indeterminate lesion    Clinical Summary: 45 y.o. female with liver lesion, obesity (BMI 40), RCC s/p robotic partial nephrectomy (2024), who was referred to Hepatology Clinic for liver lesion.     On MRI abd w/wo contrast (2/3/25), 1.8cm in the hepatic dome stable compared to CT . On MRI abd w/wo contrast (24), there is hepatomegaly with stable appearing hepatic dome lesion, likely representing a hemangioma.     AFP, CA 19-9 normal. FibroScan with severe hepatic steatosis, but no fibrosis.    Imaging to be reviewed: MRI abd w/wo contrast 2/3/25, MRI abd w/wo contrast 24, MRI abd w/wo contrast 4/3/24    HCC Treatment History: None    Platelets:   Lab Results   Component Value Date/Time     2025 12:12 PM     Creatinine:   Lab Results   Component Value Date/Time    CREATININE 0.7 2025 12:12 PM     Bilirubin:   Lab Results   Component Value Date/Time    BILITOT 0.2 2025 10:35 AM    BILITOT 0.2 2025 12:12 PM     AFP Last 3 each if available:   Lab Results   Component Value Date/Time    AFP <2.0 2025 10:35 AM       MELD: Computed MELD 3.0 unavailable. One or more values for this score either were not found within the given timeframe or did not fit some other criterion.  Computed MELD-Na unavailable. One or more values for this score either were not found within the given timeframe or did not fit some other criterion.      Plan:     Follow-up Provider:

## 2025-05-12 ENCOUNTER — CONFERENCE (OUTPATIENT)
Dept: TRANSPLANT | Facility: CLINIC | Age: 45
End: 2025-05-12
Payer: MEDICARE

## 2025-05-12 NOTE — TELEPHONE ENCOUNTER
Patient: Malorie Taylor       MRN: [id number]      : 1980     Age: 45 y.o.  [address]  [place] [address]    Presenting Radiologists:     Providers: Freida Rios MD    Priority of review: Other    Patient Transplant Status: Hepatology    Reason for presentation: Indeterminate lesion    Clinical Summary: 45 y.o. female with liver lesion, obesity (BMI 40), RCC s/p robotic partial nephrectomy (2024), who was referred to Hepatology Clinic for liver lesion.     On MRI abd w/wo contrast (2/3/25), 1.8cm in the hepatic dome stable compared to CT . On MRI abd w/wo contrast (24), there is hepatomegaly with stable appearing hepatic dome lesion, likely representing a hemangioma.     AFP, CA 19-9 normal. FibroScan with severe hepatic steatosis, but no fibrosis.    Imaging to be reviewed: MRI abd w/wo contrast 2/3/25, MRI abd w/wo contrast 24, MRI abd w/wo contrast 4/3/24    HCC Treatment History: None    Platelets:   Lab Results   Component Value Date/Time     2025 12:12 PM     Creatinine:   Lab Results   Component Value Date/Time    CREATININE 0.7 2025 12:12 PM     Bilirubin:   Lab Results   Component Value Date/Time    BILITOT 0.2 2025 10:35 AM    BILITOT 0.2 2025 12:12 PM     AFP Last 3 each if available:   Lab Results   Component Value Date/Time    AFP <2.0 2025 10:35 AM       MELD: Computed MELD 3.0 unavailable. One or more values for this score either were not found within the given timeframe or did not fit some other criterion.  Computed MELD-Na unavailable. One or more values for this score either were not found within the given timeframe or did not fit some other criterion.    IR Discussion/Plan: Motion limits evaluation of the lesion at the hepatic dome.  However, lesion appears stable in size dating back to .     Committee Discussion:  Stable lesion in Hepatic Dome since . No change in size. Probably a benign lesion since stable x  3 years. Follow up yearly with scan.     Plan:   MRI in 1 year.    Follow-up Provider: Dr Rios

## 2025-05-27 DIAGNOSIS — R93.2 ABNORMAL FINDING ON IMAGING OF LIVER: Primary | ICD-10-CM

## 2025-05-27 DIAGNOSIS — Z11.59 ENCOUNTER FOR SCREENING FOR OTHER VIRAL DISEASES: ICD-10-CM

## 2025-05-28 ENCOUNTER — TELEPHONE (OUTPATIENT)
Dept: HEPATOLOGY | Facility: CLINIC | Age: 45
End: 2025-05-28
Payer: MEDICARE

## 2025-05-28 NOTE — TELEPHONE ENCOUNTER
Patient was called and mother Nelly answered, and put me on speaker phone with the patient present. They were notified the lab did not collect all of the labs Dr. OGDEN had ordered, so I was calling to schedule the rest of the labs. Pt mother and pt voiced understanding, would like to be scheduled in [place]. Appt has neil scheduled this Friday for rest of labs. Pt and mother voiced agreement and understanding.    DEEPIKA Slade     ----- Message from Freida Rios MD sent at 5/27/2025  7:26 AM CDT -----  New orders are in. Thanks.  ----- Message -----  From: Yany Lambert MA  Sent: 5/26/2025   3:31 PM CDT  To: Freida Rios MD    Got it, in her chart there are no active requests ordered by you, please place what you'd like to be scheduled thank you  ----- Message -----  From: Freida Rios MD  Sent: 5/25/2025   7:11 AM CDT  To: Gabriel Guan Staff    Please help patient schedule remainder of labs ordered on 4/25/25. Not all the labs were collected at her last lab appt. Thank you.

## 2025-05-30 ENCOUNTER — LAB VISIT (OUTPATIENT)
Dept: LAB | Facility: HOSPITAL | Age: 45
End: 2025-05-30
Attending: INTERNAL MEDICINE
Payer: MEDICARE

## 2025-05-30 DIAGNOSIS — R93.2 ABNORMAL FINDING ON IMAGING OF LIVER: ICD-10-CM

## 2025-05-30 DIAGNOSIS — Z11.59 ENCOUNTER FOR SCREENING FOR OTHER VIRAL DISEASES: ICD-10-CM

## 2025-05-30 LAB
CERULOPLASMIN SERPL-MCNC: 38 MG/DL (ref 15–45)
HAV AB SER QL IA: REACTIVE
HBV SURFACE AB SER-ACNC: 3.78 MIU/ML
HBV SURFACE AB SERPL IA-ACNC: NORMAL M[IU]/ML
HBV SURFACE AG SERPL QL IA: NORMAL
HCV AB SERPL QL IA: NORMAL
HIV 1+2 AB+HIV1 P24 AG SERPL QL IA: NORMAL

## 2025-05-30 PROCEDURE — 82390 ASSAY OF CERULOPLASMIN: CPT

## 2025-05-30 PROCEDURE — 86706 HEP B SURFACE ANTIBODY: CPT

## 2025-05-30 PROCEDURE — 36415 COLL VENOUS BLD VENIPUNCTURE: CPT | Mod: PN

## 2025-05-30 PROCEDURE — 82104 ALPHA-1-ANTITRYPSIN PHENO: CPT

## 2025-05-30 PROCEDURE — 86803 HEPATITIS C AB TEST: CPT

## 2025-05-30 PROCEDURE — 87389 HIV-1 AG W/HIV-1&-2 AB AG IA: CPT

## 2025-05-30 PROCEDURE — 87340 HEPATITIS B SURFACE AG IA: CPT

## 2025-05-30 PROCEDURE — 86790 VIRUS ANTIBODY NOS: CPT

## 2025-06-02 ENCOUNTER — RESULTS FOLLOW-UP (OUTPATIENT)
Dept: HEPATOLOGY | Facility: CLINIC | Age: 45
End: 2025-06-02

## 2025-06-03 LAB
A1AT PHENOTYP SERPL-IMP: NORMAL BANDS
A1AT SERPL NEPH-MCNC: 167 MG/DL (ref 100–190)

## 2025-07-23 ENCOUNTER — TELEPHONE (OUTPATIENT)
Dept: PRIMARY CARE CLINIC | Facility: CLINIC | Age: 45
End: 2025-07-23
Payer: MEDICARE

## 2025-07-23 DIAGNOSIS — Z12.31 ENCOUNTER FOR SCREENING MAMMOGRAM FOR MALIGNANT NEOPLASM OF BREAST: Primary | ICD-10-CM

## 2025-07-23 NOTE — TELEPHONE ENCOUNTER
Copied from CRM #[id number]. Topic: General Inquiry - Patient Advice  >> Jul 23, 2025 10:32 AM Yessica wrote:  Type:  Needs Medical Advice    Who Called: Pat mom called in regards to getting an order for mammogram   Would the patient rather a call back or a response via [facility]? Call back   Best Call Back Number: pt [phone]   Additional Information:

## 2025-07-28 ENCOUNTER — OFFICE VISIT (OUTPATIENT)
Dept: HEPATOLOGY | Facility: CLINIC | Age: 45
End: 2025-07-28
Payer: MEDICARE

## 2025-07-28 VITALS
OXYGEN SATURATION: 100 % | BODY MASS INDEX: 40.03 KG/M2 | SYSTOLIC BLOOD PRESSURE: 158 MMHG | HEIGHT: 67 IN | DIASTOLIC BLOOD PRESSURE: 71 MMHG | TEMPERATURE: 98 F | WEIGHT: 255.06 LBS | HEART RATE: 69 BPM | RESPIRATION RATE: 18 BRPM

## 2025-07-28 DIAGNOSIS — E66.812 CLASS 2 SEVERE OBESITY DUE TO EXCESS CALORIES WITH SERIOUS COMORBIDITY AND BODY MASS INDEX (BMI) OF 39.0 TO 39.9 IN ADULT: ICD-10-CM

## 2025-07-28 DIAGNOSIS — K76.9 LIVER DISEASE, UNSPECIFIED: ICD-10-CM

## 2025-07-28 DIAGNOSIS — R16.0 HEPATOMEGALY: ICD-10-CM

## 2025-07-28 DIAGNOSIS — K76.0 METABOLIC DYSFUNCTION-ASSOCIATED STEATOTIC LIVER DISEASE (MASLD): ICD-10-CM

## 2025-07-28 DIAGNOSIS — K76.9 LIVER LESION: ICD-10-CM

## 2025-07-28 DIAGNOSIS — R93.2 ABNORMAL FINDING ON IMAGING OF LIVER: ICD-10-CM

## 2025-07-28 DIAGNOSIS — E66.01 CLASS 2 SEVERE OBESITY DUE TO EXCESS CALORIES WITH SERIOUS COMORBIDITY AND BODY MASS INDEX (BMI) OF 39.0 TO 39.9 IN ADULT: ICD-10-CM

## 2025-07-28 PROCEDURE — 99999 PR PBB SHADOW E&M-EST. PATIENT-LVL IV: CPT | Mod: PBBFAC,,, | Performed by: INTERNAL MEDICINE

## 2025-07-28 PROCEDURE — 1159F MED LIST DOCD IN RCRD: CPT | Mod: CPTII,S$GLB,, | Performed by: INTERNAL MEDICINE

## 2025-07-28 PROCEDURE — 99214 OFFICE O/P EST MOD 30 MIN: CPT | Mod: S$GLB,,, | Performed by: INTERNAL MEDICINE

## 2025-07-28 PROCEDURE — 1160F RVW MEDS BY RX/DR IN RCRD: CPT | Mod: CPTII,S$GLB,, | Performed by: INTERNAL MEDICINE

## 2025-07-28 PROCEDURE — 3078F DIAST BP <80 MM HG: CPT | Mod: CPTII,S$GLB,, | Performed by: INTERNAL MEDICINE

## 2025-07-28 PROCEDURE — 3077F SYST BP >= 140 MM HG: CPT | Mod: CPTII,S$GLB,, | Performed by: INTERNAL MEDICINE

## 2025-07-28 PROCEDURE — 3044F HG A1C LEVEL LT 7.0%: CPT | Mod: CPTII,S$GLB,, | Performed by: INTERNAL MEDICINE

## 2025-07-28 PROCEDURE — 3008F BODY MASS INDEX DOCD: CPT | Mod: CPTII,S$GLB,, | Performed by: INTERNAL MEDICINE

## 2025-07-28 NOTE — PROGRESS NOTES
Hepatology Follow Up Note    Referring provider: No ref. provider found  PCP: Martita Rico MD    Chief complaint: follow up liver lesion, abnormal imaging of the liver     Last seen in clinic on: 4/25/2025    Brief HPI:  Malorie Taylor is a 45 y.o. female with liver lesion, MASLD (F0-1, FibroScan April 2025), obesity (BMI 39), RCC s/p robotic partial nephrectomy (April 2024), who presents for scheduled follow up.    Interval Events:  - Feels ok. Accompanied by mother, Emily, who provides HPI.  - Notes earache since Saturday. Mom plans to reach out to PCP for appt.  - The patient denies abdominal distension, encephalopathy, jaundice, gross GI bleeding.  - This is the extent of the patient's complaints at this time.      Past Medical History:   Diagnosis Date    Allergy     Learning disability     approximate age 8-10    Obesity 10/31/2015    Seizures     epilepsy, last seizure teenage years       Past Surgical History:   Procedure Laterality Date    BIOPSY, WITH CT GUIDANCE Left 1/18/2023    Procedure: RENAL MASS BIOPSY, WITH CT GUIDANCE;  Surgeon: Luis Eduardo Nassar MD;  Location: [facility] CATH LAB;  Service: Radiology;  Laterality: Left;    ROBOT-ASSISTED LAPAROSCOPIC PARTIAL NEPHRECTOMY Left 4/11/2024    Procedure: ROBOTIC NEPHRECTOMY, PARTIAL;  Surgeon: Maxx Thomas MD;  Location: [facility] OR;  Service: Urology;  Laterality: Left;       Family History   Problem Relation Name Age of Onset    Hyperlipidemia Mother Nelly     Hypertension Mother Nelly     Colonic polyp Mother Nelly         a couple in the '80s    Arrhythmia Father Dani     Heart disease Father [place]     Cholecystitis Sister Nataly     Cataracts Maternal Grandmother          in either this relative or this relative's mother    Macular degeneration Maternal Grandmother          WET - AMD    Hyperlipidemia Maternal Grandmother          later in life    Insomnia Maternal Grandmother      Hypertension Maternal Grandmother      Glaucoma  Maternal Grandmother      Dementia Maternal Grandmother      Alzheimer's disease Maternal Grandmother      Heart disease Maternal Grandmother      Other Maternal Grandmother          benign breast tumor    Macular degeneration Maternal Grandfather Suhail         DRY - AMD    Vision loss Maternal Grandfather Suhail         corneal guttata (thin)    Hyperlipidemia Maternal Grandfather [place]         later in life    Alzheimer's disease Maternal Grandfather Suhail     Hypertension Maternal Grandfather [place]     Heart disease Paternal Grandmother Mehnaz     Emphysema Paternal Grandmother Mehnaz     Cervical cancer Paternal Grandmother Mehnaz     Insomnia Maternal Aunt Robyn     Glaucoma Maternal Aunt Robyn     Coronary artery disease Maternal Aunt Robyn     Sleep apnea Maternal Uncle Edgar     Macular degeneration Maternal Uncle Edgar     Diabetes Other Ifrah         insulin-dependent    Breast cancer Other Lulu         age at dx unk    Lung cancer Other Mario     Epilepsy Other      Tuberculosis Other      Heart attack Other Kerrie     Heart attack Other Ibrahima     Heart disease Other Ibrahima     Glaucoma Other Aiden     Heart disease Other Aiden     Heart attack Other Laney     Macular degeneration Other Carr     Diverticulitis Other Carr     Diabetes Other Carr     Breast cancer Other Carr 68    Heart disease Other Carr     Kidney disease Other      Dementia Other Cornelia     Heart disease Other Cornelia     Heart disease Other Luke     Acne Neg Hx      Eczema Neg Hx      Lupus Neg Hx      Psoriasis Neg Hx      Melanoma Neg Hx      Colon cancer Neg Hx      Ovarian cancer Neg Hx      Kidney cancer Neg Hx         Social History[1]    Current Medications[2]    Review of patient's allergies indicates:   Allergen Reactions    Phenobarbital      lethargy            Vitals:    07/28/25 0935   BP: (!) 158/71   Pulse: 69   Resp: 18   Temp: 97.8 °F (36.6 °C)   TempSrc: Oral   SpO2: 100%   Weight: 115.7 kg  "(255 lb 1.2 oz)   Height: 5' 7" (1.702 m)       Physical Exam    LABS: I personally reviewed pertinent laboratory findings.    Lab Results   Component Value Date    WBC 7.05 02/13/2025    HGB 11.5 (L) 02/13/2025    HCT 34.7 (L) 02/13/2025     (H) 02/13/2025     02/13/2025       Lab Results   Component Value Date     02/13/2025    K 3.6 02/13/2025     02/13/2025    CO2 21 (L) 02/13/2025    BUN 10 02/13/2025    CREATININE 0.7 02/13/2025    CALCIUM 9.1 02/13/2025    ANIONGAP 10 02/13/2025    ESTGFRAFRICA >60.0 03/02/2022    EGFRNONAA >60.0 03/02/2022       Lab Results   Component Value Date    ALT 19 04/25/2025    AST 16 04/25/2025    GGT 60 (H) 02/13/2025    ALKPHOS 139 04/25/2025    BILITOT 0.2 04/25/2025       Lab Results   Component Value Date    HEPCAB Non-Reactive 05/30/2025       Lab Results   Component Value Date    CERULOPLSM 38.0 05/30/2025        Computed MELD 3.0 unavailable. One or more values for this score either were not found within the given timeframe or did not fit some other criterion.  Computed MELD-Na unavailable. One or more values for this score either were not found within the given timeframe or did not fit some other criterion.       IMAGING: I personally reviewed imaging studies.      Assessment:  Malorie Taylor is a 45 y.o. female with liver lesion, MASLD (F0-1, FibroScan April 2025), obesity (BMI 39), RCC s/p robotic partial nephrectomy (April 2024), who presents for scheduled follow up. Per chart review, liver tests are normal. On MRI abd w/wo contrast (2/3/25), 1.8cm in the hepatic dome stable compared to CT 2022. On MRI abd w/wo contrast (7/29/24), there is hepatomegaly with stable appearing hepatic dome lesion, likely representing a hemangioma. FibroScan (4/25/25) F0-1, S3.     Imaging was reviewed in IR conference on 5/12/25. Per our discussion, this liver lesion in the hepatic dome has been stable in size since 2022. Therefore, this is most likely a " benign liver lesion. Recommendation is for yearly MRI for continued monitoring.    Regarding MASLD, I have counseled the patient regarding the importance of weight loss, exercise and adhering to a healthy diet to prevent progression of liver disease.    1. Liver lesion    2. Abnormal finding on imaging of liver    3. Metabolic dysfunction-associated steatotic liver disease (MASLD)    4. Hepatomegaly    5. Liver disease, unspecified    6. Class 2 severe obesity due to excess calories with serious comorbidity and body mass index (BMI) of 39.0 to 39.9 in adult      Recommendations:  - MRI abd w/wo contrast Feb 2026   - Mediterranean diet   - Weight loss (goal 5-10% body weight)    Return to clinic in March 2026 after MRI.    Freida Rios MD  Staff Physician  Hepatology and Liver Transplant  [facility]  [facility]           [1]   Social History  Tobacco Use    Smoking status: Never    Smokeless tobacco: Never   Substance Use Topics    Alcohol use: Never    Drug use: Never   [2]   Current Outpatient Medications   Medication Sig Dispense Refill    b complex vitamins capsule Take 1 capsule by mouth 3 (three) times a week.      carBAMazepine (TEGRETOL XR) 400 MG Tb12 Take 1 tablet (400 mg total) by mouth 2 (two) times daily. 180 tablet 3    cranberry 500 mg Cap Take by mouth Daily.      cranberry fruit concentrate (AZO CRANBERRY ORAL) Take by mouth.      ferrous sulfate (FEOSOL) 325 mg (65 mg iron) Tab tablet Take 325 mg by mouth daily with breakfast.      folic acid (FOLVITE) 400 MCG tablet Take 400 mcg by mouth once daily.      gabapentin (NEURONTIN) 600 MG tablet Take 2 tablets (1,200 mg total) by mouth 3 (three) times daily. 540 tablet 3    hydrocortisone (ANUSOL-HC) 2.5 % rectal cream Place rectally 2 (two) times daily. 28 g 1    metronidazole 0.75% (METROCREAM) 0.75 % Crea AAA face bid 45 g 3    multivitamin capsule Take 1 capsule by mouth once daily.       polyethylene glycol (GLYCOLAX) 17 gram/dose powder Take 17 g by mouth once daily. 238 g 0    topiramate (TOPAMAX) 200 MG Tab Take 1 tablet (200 mg total) by mouth 2 (two) times daily. 180 tablet 3     No current facility-administered medications for this visit.

## 2025-08-01 ENCOUNTER — LAB VISIT (OUTPATIENT)
Dept: LAB | Facility: HOSPITAL | Age: 45
End: 2025-08-01
Attending: UROLOGY
Payer: MEDICARE

## 2025-08-01 DIAGNOSIS — C64.2 RENAL CELL CARCINOMA OF LEFT KIDNEY: ICD-10-CM

## 2025-08-01 LAB
ANION GAP (OHS): 10 MMOL/L (ref 8–16)
BUN SERPL-MCNC: 9 MG/DL (ref 6–20)
CALCIUM SERPL-MCNC: 9.3 MG/DL (ref 8.7–10.5)
CHLORIDE SERPL-SCNC: 102 MMOL/L (ref 95–110)
CO2 SERPL-SCNC: 23 MMOL/L (ref 23–29)
CREAT SERPL-MCNC: 0.7 MG/DL (ref 0.5–1.4)
GFR SERPLBLD CREATININE-BSD FMLA CKD-EPI: >60 ML/MIN/1.73/M2
GLUCOSE SERPL-MCNC: 76 MG/DL (ref 70–110)
POTASSIUM SERPL-SCNC: 3.6 MMOL/L (ref 3.5–5.1)
SODIUM SERPL-SCNC: 135 MMOL/L (ref 136–145)

## 2025-08-01 PROCEDURE — 36415 COLL VENOUS BLD VENIPUNCTURE: CPT | Mod: PN

## 2025-08-01 PROCEDURE — 80048 BASIC METABOLIC PNL TOTAL CA: CPT

## 2025-08-21 ENCOUNTER — HOSPITAL ENCOUNTER (OUTPATIENT)
Dept: RADIOLOGY | Facility: OTHER | Age: 45
Discharge: HOME | End: 2025-08-21
Attending: STUDENT IN AN ORGANIZED HEALTH CARE EDUCATION/TRAINING PROGRAM
Payer: MEDICARE

## 2025-08-21 ENCOUNTER — HOSPITAL ENCOUNTER (OUTPATIENT)
Dept: RADIOLOGY | Facility: OTHER | Age: 45
Discharge: HOME | End: 2025-08-21
Attending: UROLOGY
Payer: MEDICARE

## 2025-08-21 DIAGNOSIS — C64.2 RENAL CELL CARCINOMA OF LEFT KIDNEY: ICD-10-CM

## 2025-08-21 DIAGNOSIS — R91.8 MULTIPLE LUNG NODULES: ICD-10-CM

## 2025-08-21 DIAGNOSIS — D30.00 RENAL ONCOCYTOMA, UNSPECIFIED LATERALITY: ICD-10-CM

## 2025-08-21 DIAGNOSIS — Z90.5 H/O LEFT NEPHRECTOMY: ICD-10-CM

## 2025-08-21 PROCEDURE — 74178 CT ABD&PLV WO CNTR FLWD CNTR: CPT | Mod: 26,,, | Performed by: RADIOLOGY

## 2025-08-21 PROCEDURE — 71250 CT THORAX DX C-: CPT | Mod: TC

## 2025-08-21 PROCEDURE — 25500020 PHARM REV CODE 255: Performed by: UROLOGY

## 2025-08-21 PROCEDURE — 71250 CT THORAX DX C-: CPT | Mod: 26,,, | Performed by: RADIOLOGY

## 2025-08-21 PROCEDURE — 74178 CT ABD&PLV WO CNTR FLWD CNTR: CPT | Mod: TC

## 2025-08-21 RX ADMIN — IOHEXOL 100 ML: 350 INJECTION, SOLUTION INTRAVENOUS at 08:08

## 2025-08-22 ENCOUNTER — TELEPHONE (OUTPATIENT)
Dept: UROLOGY | Facility: CLINIC | Age: 45
End: 2025-08-22

## 2025-08-22 ENCOUNTER — OFFICE VISIT (OUTPATIENT)
Dept: UROLOGY | Facility: CLINIC | Age: 45
End: 2025-08-22
Payer: MEDICARE

## 2025-08-22 VITALS
HEART RATE: 67 BPM | BODY MASS INDEX: 39.44 KG/M2 | OXYGEN SATURATION: 100 % | HEIGHT: 67 IN | DIASTOLIC BLOOD PRESSURE: 76 MMHG | WEIGHT: 251.31 LBS | SYSTOLIC BLOOD PRESSURE: 127 MMHG

## 2025-08-22 DIAGNOSIS — C64.2 RENAL CELL CARCINOMA OF LEFT KIDNEY: Primary | ICD-10-CM

## (undated) DEVICE — TROCAR ENDOPATH XCEL 5X100MM

## (undated) DEVICE — SUT VICRYL+ 27 UR-6 VIOL

## (undated) DEVICE — ELECTRODE REM PLYHSV RETURN 9

## (undated) DEVICE — STAPLER ECHELON FLEX GST 45MM

## (undated) DEVICE — SUT ETHIBOND EXCEL 0 CT2 30

## (undated) DEVICE — APPLICATOR ENDOSCOPIC

## (undated) DEVICE — COVER TIP CURVED SCISSORS XI

## (undated) DEVICE — SCISSOR 5MMX35CM DIRECT DRIVE

## (undated) DEVICE — NDL INSUFFLATION VERRES 120MM

## (undated) DEVICE — SUT MCRYL PLUS 4-0 PS2 27IN

## (undated) DEVICE — DRAPE COLUMN DAVINCI XI

## (undated) DEVICE — KIT SURGIFLO HEMOSTATIC MATRIX

## (undated) DEVICE — SPONGE GELFOAM 12-7MM

## (undated) DEVICE — GLOVE SENSICARE PI ORTHO 7.0

## (undated) DEVICE — OBTURATOR BLADELESS 8MM XI CLR

## (undated) DEVICE — TRAY DO THE ROBOT

## (undated) DEVICE — ELECTRODE PATIENT RETURN DISP

## (undated) DEVICE — Device

## (undated) DEVICE — DRESSING MEPORE ISLAND 31/2X4

## (undated) DEVICE — DRAPE ARM DAVINCI XI

## (undated) DEVICE — SOL ELECTROLUBE ANTI-STIC

## (undated) DEVICE — KITTNER ENDOSCOPIC BLNT 5MM

## (undated) DEVICE — SUT STRATAFIX SPIRAL 20CM

## (undated) DEVICE — SUT ETHIBOND 0 CR/CT-2 8-18

## (undated) DEVICE — SOL POVIDONE SCRUB IODINE 4 OZ

## (undated) DEVICE — GLOVE BIOGEL 7.0

## (undated) DEVICE — SUT VICRYL 3-0 27 SH

## (undated) DEVICE — PORT ACCESS 5MM W/100MM

## (undated) DEVICE — STAPLER SKIN PROXIMATE WIDE

## (undated) DEVICE — IRRIGATOR ENDOSCOPY DISP.

## (undated) DEVICE — TROCAR ENDOPATH XCEL 12X100MM

## (undated) DEVICE — BAG DRAIN ANTI REFLUX 2000ML

## (undated) DEVICE — CLIP HEMO-LOK MLX LARGE LF

## (undated) DEVICE — CONTAINER SPEC OR STRL 4.5OZ

## (undated) DEVICE — BIT DRILL TWST CBLT/CHRM 1/8X5

## (undated) DEVICE — SUT STRATAFIX 3-0 SH 8IN

## (undated) DEVICE — NDL SPINAL 20GX3.5 HUB

## (undated) DEVICE — SEALER VESSEL EXTEND

## (undated) DEVICE — TROCAR SPACEMAKER BLUNT 12MM

## (undated) DEVICE — SOL IRRI STRL WATER 1000ML

## (undated) DEVICE — SYS SEE SHARP SCP ANTIFG LNG

## (undated) DEVICE — SCALPEL #15 BLADE STRL DISP.

## (undated) DEVICE — ADHESIVE DERMABOND ADVANCED

## (undated) DEVICE — TOWEL OR DISP STRL BLUE 4/PK

## (undated) DEVICE — SET TRI-LUMEN FILTERED TUBE

## (undated) DEVICE — SEAL UNIVERSAL 5MM-8MM XI

## (undated) DEVICE — SUT 0 60IN PDS II VIO MONO

## (undated) DEVICE — SUT 0 VICRYL PLUS CT-1 27IN

## (undated) DEVICE — SUT CTD VICRYL 0 UND BR SUT

## (undated) DEVICE — BAG TISSUE RETRIEVAL 225ML

## (undated) DEVICE — HEMOSTAT SURGICEL 4X8IN